# Patient Record
Sex: FEMALE | Race: BLACK OR AFRICAN AMERICAN | NOT HISPANIC OR LATINO | Employment: UNEMPLOYED | URBAN - METROPOLITAN AREA
[De-identification: names, ages, dates, MRNs, and addresses within clinical notes are randomized per-mention and may not be internally consistent; named-entity substitution may affect disease eponyms.]

---

## 2019-02-06 ENCOUNTER — OFFICE VISIT (OUTPATIENT)
Dept: PLASTIC SURGERY | Facility: CLINIC | Age: 54
End: 2019-02-06
Payer: OTHER GOVERNMENT

## 2019-02-06 VITALS
SYSTOLIC BLOOD PRESSURE: 119 MMHG | HEART RATE: 60 BPM | HEIGHT: 59 IN | BODY MASS INDEX: 28.34 KG/M2 | WEIGHT: 140.56 LBS | DIASTOLIC BLOOD PRESSURE: 75 MMHG

## 2019-02-06 DIAGNOSIS — Z15.09 BRCA GENE MUTATION POSITIVE: ICD-10-CM

## 2019-02-06 DIAGNOSIS — N62 MACROMASTIA: Primary | ICD-10-CM

## 2019-02-06 DIAGNOSIS — Z15.01 BRCA GENE MUTATION POSITIVE: ICD-10-CM

## 2019-02-06 PROCEDURE — 99999 PR PBB SHADOW E&M-NEW PATIENT-LVL III: ICD-10-PCS | Mod: PBBFAC,,, | Performed by: SURGERY

## 2019-02-06 PROCEDURE — 99999 PR PBB SHADOW E&M-NEW PATIENT-LVL III: CPT | Mod: PBBFAC,,, | Performed by: SURGERY

## 2019-02-06 PROCEDURE — 99203 PR OFFICE/OUTPT VISIT, NEW, LEVL III, 30-44 MIN: ICD-10-PCS | Mod: S$PBB,,, | Performed by: SURGERY

## 2019-02-06 PROCEDURE — 99203 OFFICE O/P NEW LOW 30 MIN: CPT | Mod: PBBFAC,PO | Performed by: SURGERY

## 2019-02-06 PROCEDURE — 99203 OFFICE O/P NEW LOW 30 MIN: CPT | Mod: S$PBB,,, | Performed by: SURGERY

## 2019-02-06 RX ORDER — CHOLECALCIFEROL (VITAMIN D3) 25 MCG
1000 TABLET ORAL DAILY
COMMUNITY
End: 2021-02-17 | Stop reason: CLARIF

## 2019-02-06 RX ORDER — ALBUTEROL SULFATE 5 MG/ML
2.5 SOLUTION RESPIRATORY (INHALATION) EVERY 6 HOURS PRN
COMMUNITY
End: 2021-02-17 | Stop reason: CLARIF

## 2019-02-06 NOTE — LETTER
[facility] [place]  [address]  [facility] [address]  Phone: [phone]  Fax: [phone] February 13, 2019      Mile Knight MD  [address] [facility] [address]    Patient: Trinity Morin   MR Number: [id number]   YOB: 1965   Date of Visit: 2/6/2019     Dear Dr. Knight:    Thank you for referring Trinity Morin to me for evaluation. Below are the relevant portions of my assessment and plan of care.    Ms. Morin is a 53-year-old female with BRCA mutation. She has been thorough with close follow-up with regular mammograms.  She is in our Plastic Surgery Clinic because she is interested in a reduction mammaplasty. She has no complaints. She does not want any mastectomy although she understands the risks of the BRCA mutation on breast cancer incidence.    We had a long discussion with the patient about breast reduction in that it will reduce her back and neck symptoms and likely reduce the risk of cancer but to a lesser extent than a mastectomy. We also explained that the usual guidelines recommend considering bilateral mastectomy, and that breast reduction is not a usual [facility] guideline for risk reduction of patient at high risk of breast cancer.     We will discuss her case with breast oncology and will contact her with the best recommendation.    If you have questions, please do not hesitate to call me. I look forward to following Trinity along with you.    Sincerely,      Kevin Diaz MD  Section of Plastic Surgery  Department of Surgery  [facility]     SAY/rowena

## 2019-02-06 NOTE — PROGRESS NOTES
Ms. Morin is a 53 F with BRCA mutation. She has been thorough with close follow-up with regular mammograms.  She is in our plastic surgery clinic because she is interested in a reduction mammaplasty.   She has no complaints.  She does not want any mastectomy although she understands the risks of the BRCA mutation on breast cancer incidence.    We had a long discussion with the patient about breast reduction in that it will reduce her back and neck symptoms and likely reduce the risk of cancer but to a lesser extent than a mastectomy. We also explained that the usual guidelines recommend considering bilateral mastectomy, and that breast reduction is not a usual [facility] guideline for risk reduction of patient at high risk of breast cancer.    We will discuss her case with breast oncology and will contact her with the best recommendation.

## 2019-04-05 ENCOUNTER — TELEPHONE (OUTPATIENT)
Dept: PLASTIC SURGERY | Facility: CLINIC | Age: 54
End: 2019-04-05

## 2019-04-10 ENCOUNTER — OFFICE VISIT (OUTPATIENT)
Dept: PLASTIC SURGERY | Facility: CLINIC | Age: 54
End: 2019-04-10
Payer: OTHER GOVERNMENT

## 2019-04-10 VITALS
BODY MASS INDEX: 26.95 KG/M2 | SYSTOLIC BLOOD PRESSURE: 125 MMHG | HEIGHT: 59 IN | WEIGHT: 133.69 LBS | DIASTOLIC BLOOD PRESSURE: 79 MMHG | HEART RATE: 67 BPM

## 2019-04-10 DIAGNOSIS — Z15.01 BRCA GENE MUTATION POSITIVE: Primary | ICD-10-CM

## 2019-04-10 DIAGNOSIS — Z15.09 BRCA GENE MUTATION POSITIVE: Primary | ICD-10-CM

## 2019-04-10 PROCEDURE — 99999 PR PBB SHADOW E&M-EST. PATIENT-LVL III: CPT | Mod: PBBFAC,,, | Performed by: SURGERY

## 2019-04-10 PROCEDURE — 99213 OFFICE O/P EST LOW 20 MIN: CPT | Mod: PBBFAC,PO | Performed by: SURGERY

## 2019-04-10 PROCEDURE — 99211 PR OFFICE/OUTPT VISIT, EST, LEVL I: ICD-10-PCS | Mod: S$PBB,,, | Performed by: SURGERY

## 2019-04-10 PROCEDURE — 99211 OFF/OP EST MAY X REQ PHY/QHP: CPT | Mod: S$PBB,,, | Performed by: SURGERY

## 2019-04-10 PROCEDURE — 99999 PR PBB SHADOW E&M-EST. PATIENT-LVL III: ICD-10-PCS | Mod: PBBFAC,,, | Performed by: SURGERY

## 2019-04-10 NOTE — PROGRESS NOTES
Pt returns to clinic for breast reduction. She is BRCA pos and during her last visit  I discussed with her that I needed to check with our breast surgeons regarding a mastectomy vs a reduction.  It was found that it is a patient choice. She understands that she will continue to be a high risk for breast cancer   With a reduction. She stated that she does not want a mastectomy and understands the risks.  We will proceed.

## 2019-05-02 ENCOUNTER — TELEPHONE (OUTPATIENT)
Dept: PLASTIC SURGERY | Facility: CLINIC | Age: 54
End: 2019-05-02

## 2020-04-30 ENCOUNTER — TELEPHONE (OUTPATIENT)
Dept: PLASTIC SURGERY | Facility: CLINIC | Age: 55
End: 2020-04-30

## 2020-04-30 NOTE — TELEPHONE ENCOUNTER
Attempted to contact pt to schedule consult.  Pt was not available at the time of the call.  I left a detailed VM asking pt to call PLS office at her earliest convenience.     ----- Message from Kevin Kimble sent at 4/30/2020 11:39 AM CDT -----  Contact: Pt  Patient called to speak w/ someone regarding scheduling for her breast augmentation, requesting callback     Callback: [phone] (home)

## 2020-09-16 ENCOUNTER — OFFICE VISIT (OUTPATIENT)
Dept: PLASTIC SURGERY | Facility: CLINIC | Age: 55
End: 2020-09-16
Payer: OTHER GOVERNMENT

## 2020-09-16 VITALS
BODY MASS INDEX: 26.94 KG/M2 | WEIGHT: 133.63 LBS | DIASTOLIC BLOOD PRESSURE: 75 MMHG | HEART RATE: 58 BPM | HEIGHT: 59 IN | SYSTOLIC BLOOD PRESSURE: 128 MMHG

## 2020-09-16 DIAGNOSIS — N62 MACROMASTIA: Primary | ICD-10-CM

## 2020-09-16 DIAGNOSIS — Z15.09 BRCA GENE MUTATION POSITIVE: ICD-10-CM

## 2020-09-16 DIAGNOSIS — Z15.01 BRCA GENE MUTATION POSITIVE: ICD-10-CM

## 2020-09-16 PROCEDURE — 99213 PR OFFICE/OUTPT VISIT, EST, LEVL III, 20-29 MIN: ICD-10-PCS | Mod: S$PBB,,, | Performed by: SURGERY

## 2020-09-16 PROCEDURE — 99213 OFFICE O/P EST LOW 20 MIN: CPT | Mod: S$PBB,,, | Performed by: SURGERY

## 2020-09-16 PROCEDURE — 99213 OFFICE O/P EST LOW 20 MIN: CPT | Mod: PBBFAC | Performed by: SURGERY

## 2020-09-16 PROCEDURE — 99999 PR PBB SHADOW E&M-EST. PATIENT-LVL III: ICD-10-PCS | Mod: PBBFAC,,, | Performed by: SURGERY

## 2020-09-16 PROCEDURE — 99999 PR PBB SHADOW E&M-EST. PATIENT-LVL III: CPT | Mod: PBBFAC,,, | Performed by: SURGERY

## 2020-09-16 NOTE — PROGRESS NOTES
History & Physical    SUBJECTIVE:   Chief complaint: large breasts    History of Present Illness:    Trinity Morin presents to [facility] on 9/16/2020 for evaluation for bilateral breast reduction secondary to symptomatic bilateral large pendulous breast. She has a chief complaint of chronic neck and back pain for many years. She has tried NSAIDs/Tylenol without alleviation of pain. She also complains of deep shoulder grooving from her bra straps as well as macerating rashes below each breast that have not significantly improved with application of medicated ointments and creams.  Pt is BRCA positive but does not want mastectomy.      No past medical history on file.    No past surgical history on file.    No family history on file.    Social History     Socioeconomic History    Marital status: Single     Spouse name: Not on file    Number of children: Not on file    Years of education: Not on file    Highest education level: Not on file   Occupational History    Not on file   Social Needs    Financial resource strain: Not on file    Food insecurity     Worry: Not on file     Inability: Not on file    Transportation needs     Medical: Not on file     Non-medical: Not on file   Tobacco Use    Smoking status: Never Smoker    Smokeless tobacco: Never Used   Substance and Sexual Activity    Alcohol use: Not on file    Drug use: Not on file    Sexual activity: Not on file   Lifestyle    Physical activity     Days per week: Not on file     Minutes per session: Not on file    Stress: Not on file   Relationships    Social connections     Talks on phone: Not on file     Gets together: Not on file     Attends Gnosticist service: Not on file     Active member of club or organization: Not on file     Attends meetings of clubs or organizations: Not on file     Relationship status: Not on file   Other Topics Concern    Not on file   Social History Narrative    Not on file       Current  Detail Level: Zone "Outpatient Medications   Medication Sig Dispense Refill    albuterol (PROVENTIL) 5 mg/mL nebulizer solution Take 2.5 mg by nebulization every 6 (six) hours as needed for Wheezing. Rescue      cyanocobalamin, vitamin B-12, (VITAMIN B-12) 50 mcg tablet Take 50 mcg by mouth once daily.      vitamin D (VITAMIN D3) 1000 units Tab Take 1,000 Units by mouth once daily.       No current facility-administered medications for this visit.        Review of patient's allergies indicates:   Allergen Reactions    Bactrim [sulfamethoxazole-trimethoprim]     Macrodantin [nitrofurantoin macrocrystalline]     Sulfa (sulfonamide antibiotics)          Review of Systems:    Review of Systems   HENT: Positive for neck pain.    Musculoskeletal: Positive for back pain.   Neurological: Negative for headaches or dizziness      OBJECTIVE:     /75   Pulse (!) 58   Ht 4' 11" (1.499 m)   Wt 60.6 kg (133 lb 9.6 oz)   BMI 26.98 kg/m²       Physical Exam:    Physical Exam   Constitutional: She is oriented to person, place, and time. She appears well-developed and well-nourished.   Neck: Normal range of motion. Neck supple. No tracheal deviation present.   Cardiovascular: Normal rate, regular rhythm and normal heart sounds.    Pulmonary/Chest: Effort normal and breath sounds normal. bilaterally enlarged breasts, evidence of previous rashes, shoulder grooving, no palpable masses, nipple everted  Abdominal: Soft. Bowel sounds are normal.   Musculoskeletal: Normal range of motion.   Neurological: She is alert and oriented to person, place, and time.   Skin: Skin is warm.   Breasts: large pendulous breasts    ASSESSMENT/PLAN:     1.Symptomatic Bilateral Macromastia  2. Chronic neck pain  3. Chronic back pain  4. Chronic breast rashes    PLAN:Plan    - will need MRI/mammogram and any outside imaging prior to proceeding w/ BBR  -Will submit paper work for insurance approval  -Photos obtained  -Risk, benefits, and alternatives explained. She " understands that the risks include but are not limited to bleeding, scarring, infection, pain, numbness, asymmetry, deformity, open wound, skin necrosis, wound dehiscence, permanent or temporary loss of sensation to the nipple, partial or total nipple loss requiring removal, poor cosmetic outcome, hematoma, seroma and pulmonary emobolus.   - Patient would like to proceed with scheduling bilateral breast reduction pending insurance authorization    All questions were answered. The patient was advised to call the clinic with any questions or concerns prior to their next visit.

## 2020-09-29 ENCOUNTER — TELEPHONE (OUTPATIENT)
Dept: PLASTIC SURGERY | Facility: CLINIC | Age: 55
End: 2020-09-29

## 2020-09-29 NOTE — TELEPHONE ENCOUNTER
Pt was called back in regards to message below. Pt understands that we will be in touch when we hear from insurance      ----- Message from Mayra Berry sent at 9/29/2020 12:44 PM CDT -----  Regarding: Pt  Reason: Pt calling to speak with Ab regard to scheduling surgery. Pt stated she's trying to make arrangements to go out of town. Please call     Communication: [phone]

## 2021-01-27 ENCOUNTER — PATIENT MESSAGE (OUTPATIENT)
Dept: PLASTIC SURGERY | Facility: CLINIC | Age: 56
End: 2021-01-27

## 2021-01-27 ENCOUNTER — OFFICE VISIT (OUTPATIENT)
Dept: PLASTIC SURGERY | Facility: CLINIC | Age: 56
End: 2021-01-27
Payer: OTHER GOVERNMENT

## 2021-01-27 DIAGNOSIS — N62 MACROMASTIA: ICD-10-CM

## 2021-01-27 DIAGNOSIS — Z15.09 BRCA GENE MUTATION POSITIVE: Primary | ICD-10-CM

## 2021-01-27 DIAGNOSIS — Z15.01 BRCA GENE MUTATION POSITIVE: Primary | ICD-10-CM

## 2021-01-27 PROCEDURE — 99999 PR PBB SHADOW E&M-EST. PATIENT-LVL I: CPT | Mod: PBBFAC,,, | Performed by: SURGERY

## 2021-01-27 PROCEDURE — 99213 PR OFFICE/OUTPT VISIT, EST, LEVL III, 20-29 MIN: ICD-10-PCS | Mod: S$PBB,,, | Performed by: SURGERY

## 2021-01-27 PROCEDURE — 99213 OFFICE O/P EST LOW 20 MIN: CPT | Mod: S$PBB,,, | Performed by: SURGERY

## 2021-01-27 PROCEDURE — 99999 PR PBB SHADOW E&M-EST. PATIENT-LVL I: ICD-10-PCS | Mod: PBBFAC,,, | Performed by: SURGERY

## 2021-01-27 PROCEDURE — 99211 OFF/OP EST MAY X REQ PHY/QHP: CPT | Mod: PBBFAC | Performed by: SURGERY

## 2021-02-08 DIAGNOSIS — Z01.818 PRE-OP TESTING: Primary | ICD-10-CM

## 2021-02-08 DIAGNOSIS — N62 MACROMASTIA: Primary | ICD-10-CM

## 2021-02-18 ENCOUNTER — TELEPHONE (OUTPATIENT)
Dept: PLASTIC SURGERY | Facility: CLINIC | Age: 56
End: 2021-02-18

## 2021-02-18 ENCOUNTER — TELEPHONE (OUTPATIENT)
Dept: PREADMISSION TESTING | Facility: HOSPITAL | Age: 56
End: 2021-02-18

## 2021-02-22 ENCOUNTER — LAB VISIT (OUTPATIENT)
Dept: LAB | Facility: HOSPITAL | Age: 56
End: 2021-02-22
Attending: ANESTHESIOLOGY
Payer: OTHER GOVERNMENT

## 2021-02-22 ENCOUNTER — LAB VISIT (OUTPATIENT)
Dept: INTERNAL MEDICINE | Facility: CLINIC | Age: 56
End: 2021-02-22
Payer: OTHER GOVERNMENT

## 2021-02-22 DIAGNOSIS — Z01.818 PRE-OP TESTING: ICD-10-CM

## 2021-02-22 DIAGNOSIS — Z98.890 S/P BILATERAL BREAST REDUCTION: ICD-10-CM

## 2021-02-22 PROCEDURE — 85018 HEMOGLOBIN: CPT

## 2021-02-22 PROCEDURE — 36415 COLL VENOUS BLD VENIPUNCTURE: CPT

## 2021-02-22 PROCEDURE — U0005 INFEC AGEN DETEC AMPLI PROBE: HCPCS

## 2021-02-22 PROCEDURE — U0003 INFECTIOUS AGENT DETECTION BY NUCLEIC ACID (DNA OR RNA); SEVERE ACUTE RESPIRATORY SYNDROME CORONAVIRUS 2 (SARS-COV-2) (CORONAVIRUS DISEASE [COVID-19]), AMPLIFIED PROBE TECHNIQUE, MAKING USE OF HIGH THROUGHPUT TECHNOLOGIES AS DESCRIBED BY CMS-2020-01-R: HCPCS

## 2021-02-23 LAB
HGB BLD-MCNC: 12.8 G/DL (ref 12–16)
SARS-COV-2 RNA RESP QL NAA+PROBE: NOT DETECTED

## 2021-02-24 ENCOUNTER — ANESTHESIA EVENT (OUTPATIENT)
Dept: SURGERY | Facility: HOSPITAL | Age: 56
End: 2021-02-24
Payer: OTHER GOVERNMENT

## 2021-02-24 ENCOUNTER — TELEPHONE (OUTPATIENT)
Dept: PLASTIC SURGERY | Facility: CLINIC | Age: 56
End: 2021-02-24

## 2021-02-25 ENCOUNTER — ANESTHESIA (OUTPATIENT)
Dept: SURGERY | Facility: HOSPITAL | Age: 56
End: 2021-02-25
Payer: OTHER GOVERNMENT

## 2021-02-25 ENCOUNTER — HOSPITAL ENCOUNTER (OUTPATIENT)
Facility: HOSPITAL | Age: 56
Discharge: HOME | End: 2021-02-25
Attending: SURGERY | Admitting: SURGERY
Payer: OTHER GOVERNMENT

## 2021-02-25 VITALS
OXYGEN SATURATION: 100 % | HEIGHT: 59 IN | WEIGHT: 134 LBS | HEART RATE: 82 BPM | DIASTOLIC BLOOD PRESSURE: 76 MMHG | SYSTOLIC BLOOD PRESSURE: 134 MMHG | BODY MASS INDEX: 27.01 KG/M2 | RESPIRATION RATE: 16 BRPM | TEMPERATURE: 98 F

## 2021-02-25 DIAGNOSIS — Z01.818 PRE-OP TESTING: ICD-10-CM

## 2021-02-25 PROCEDURE — 64461 PVB THORACIC SINGLE INJ SITE: CPT | Performed by: STUDENT IN AN ORGANIZED HEALTH CARE EDUCATION/TRAINING PROGRAM

## 2021-02-25 PROCEDURE — 63600175 PHARM REV CODE 636 W HCPCS: Performed by: ANESTHESIOLOGY

## 2021-02-25 PROCEDURE — 88307 PR  SURG PATH,LEVEL V: ICD-10-PCS | Mod: 26,,, | Performed by: PATHOLOGY

## 2021-02-25 PROCEDURE — 00402 ANES INTEG SYS RCNSTV BREAST: CPT | Performed by: SURGERY

## 2021-02-25 PROCEDURE — 37000009 HC ANESTHESIA EA ADD 15 MINS: Performed by: SURGERY

## 2021-02-25 PROCEDURE — 88305 TISSUE EXAM BY PATHOLOGIST: CPT | Performed by: PATHOLOGY

## 2021-02-25 PROCEDURE — 63600175 PHARM REV CODE 636 W HCPCS: Performed by: STUDENT IN AN ORGANIZED HEALTH CARE EDUCATION/TRAINING PROGRAM

## 2021-02-25 PROCEDURE — 88331 PATH CONSLTJ SURG 1 BLK 1SPC: CPT | Performed by: PATHOLOGY

## 2021-02-25 PROCEDURE — 63600175 PHARM REV CODE 636 W HCPCS: Performed by: NURSE ANESTHETIST, CERTIFIED REGISTERED

## 2021-02-25 PROCEDURE — 64461 PVB THORACIC SINGLE INJ SITE: CPT | Mod: 50,59,, | Performed by: ANESTHESIOLOGY

## 2021-02-25 PROCEDURE — 88331 PR  PATH CONSULT IN SURG,W FRZ SEC: ICD-10-PCS | Mod: 26,,, | Performed by: PATHOLOGY

## 2021-02-25 PROCEDURE — 25000003 PHARM REV CODE 250: Performed by: SURGERY

## 2021-02-25 PROCEDURE — 71000016 HC POSTOP RECOV ADDL HR: Performed by: SURGERY

## 2021-02-25 PROCEDURE — 36000706: Performed by: SURGERY

## 2021-02-25 PROCEDURE — 88307 TISSUE EXAM BY PATHOLOGIST: CPT | Mod: 26,,, | Performed by: PATHOLOGY

## 2021-02-25 PROCEDURE — 88305 TISSUE EXAM BY PATHOLOGIST: CPT | Mod: 26,,, | Performed by: PATHOLOGY

## 2021-02-25 PROCEDURE — 64461 ERECTOR SPINAE PLANE SINGLE INJECTION BLOCK: ICD-10-PCS | Mod: 50,59,, | Performed by: ANESTHESIOLOGY

## 2021-02-25 PROCEDURE — 88307 TISSUE EXAM BY PATHOLOGIST: CPT | Performed by: PATHOLOGY

## 2021-02-25 PROCEDURE — 36000707: Performed by: SURGERY

## 2021-02-25 PROCEDURE — 71000044 HC DOSC ROUTINE RECOVERY FIRST HOUR: Performed by: SURGERY

## 2021-02-25 PROCEDURE — 19318 BREAST REDUCTION: CPT | Mod: 50,,, | Performed by: SURGERY

## 2021-02-25 PROCEDURE — 25000003 PHARM REV CODE 250: Performed by: ANESTHESIOLOGY

## 2021-02-25 PROCEDURE — 63600175 PHARM REV CODE 636 W HCPCS: Performed by: GENERAL PRACTICE

## 2021-02-25 PROCEDURE — D9220A PRA ANESTHESIA: Mod: ,,, | Performed by: ANESTHESIOLOGY

## 2021-02-25 PROCEDURE — 25000003 PHARM REV CODE 250: Performed by: NURSE ANESTHETIST, CERTIFIED REGISTERED

## 2021-02-25 PROCEDURE — 27201423 OPTIME MED/SURG SUP & DEVICES STERILE SUPPLY: Performed by: SURGERY

## 2021-02-25 PROCEDURE — 71000015 HC POSTOP RECOV 1ST HR: Performed by: SURGERY

## 2021-02-25 PROCEDURE — C1729 CATH, DRAINAGE: HCPCS | Performed by: SURGERY

## 2021-02-25 PROCEDURE — 88305 TISSUE EXAM BY PATHOLOGIST: ICD-10-PCS | Mod: 26,,, | Performed by: PATHOLOGY

## 2021-02-25 PROCEDURE — 37000008 HC ANESTHESIA 1ST 15 MINUTES: Performed by: SURGERY

## 2021-02-25 PROCEDURE — D9220A PRA ANESTHESIA: ICD-10-PCS | Mod: ,,, | Performed by: ANESTHESIOLOGY

## 2021-02-25 PROCEDURE — 71000045 HC DOSC ROUTINE RECOVERY EA ADD'L HR: Performed by: SURGERY

## 2021-02-25 PROCEDURE — 25000003 PHARM REV CODE 250: Performed by: STUDENT IN AN ORGANIZED HEALTH CARE EDUCATION/TRAINING PROGRAM

## 2021-02-25 PROCEDURE — 19318 PR REDUCTION OF LARGE BREAST: ICD-10-PCS | Mod: 50,,, | Performed by: SURGERY

## 2021-02-25 PROCEDURE — S0020 INJECTION, BUPIVICAINE HYDRO: HCPCS | Performed by: ANESTHESIOLOGY

## 2021-02-25 PROCEDURE — 88331 PATH CONSLTJ SURG 1 BLK 1SPC: CPT | Mod: 26,,, | Performed by: PATHOLOGY

## 2021-02-25 RX ORDER — HALOPERIDOL 5 MG/ML
0.5 INJECTION INTRAMUSCULAR EVERY 10 MIN PRN
Status: DISCONTINUED | OUTPATIENT
Start: 2021-02-25 | End: 2021-02-25 | Stop reason: HOSPADM

## 2021-02-25 RX ORDER — CEPHALEXIN 500 MG/1
500 CAPSULE ORAL EVERY 8 HOURS
Qty: 21 CAPSULE | Refills: 0 | Status: SHIPPED | OUTPATIENT
Start: 2021-02-25 | End: 2021-03-04

## 2021-02-25 RX ORDER — ROCURONIUM BROMIDE 10 MG/ML
INJECTION, SOLUTION INTRAVENOUS
Status: DISCONTINUED | OUTPATIENT
Start: 2021-02-25 | End: 2021-02-25

## 2021-02-25 RX ORDER — PHENYLEPHRINE HYDROCHLORIDE 10 MG/ML
INJECTION INTRAVENOUS
Status: DISCONTINUED | OUTPATIENT
Start: 2021-02-25 | End: 2021-02-25

## 2021-02-25 RX ORDER — LIDOCAINE HYDROCHLORIDE 20 MG/ML
INJECTION, SOLUTION EPIDURAL; INFILTRATION; INTRACAUDAL; PERINEURAL
Status: DISCONTINUED | OUTPATIENT
Start: 2021-02-25 | End: 2021-02-25

## 2021-02-25 RX ORDER — OXYCODONE AND ACETAMINOPHEN 5; 325 MG/1; MG/1
1 TABLET ORAL
Status: DISCONTINUED | OUTPATIENT
Start: 2021-02-25 | End: 2021-02-25 | Stop reason: HOSPADM

## 2021-02-25 RX ORDER — PROPOFOL 10 MG/ML
VIAL (ML) INTRAVENOUS
Status: DISCONTINUED | OUTPATIENT
Start: 2021-02-25 | End: 2021-02-25

## 2021-02-25 RX ORDER — ONDANSETRON 2 MG/ML
INJECTION INTRAMUSCULAR; INTRAVENOUS
Status: DISCONTINUED | OUTPATIENT
Start: 2021-02-25 | End: 2021-02-25

## 2021-02-25 RX ORDER — KETAMINE HCL IN 0.9 % NACL 50 MG/5 ML
SYRINGE (ML) INTRAVENOUS
Status: DISCONTINUED | OUTPATIENT
Start: 2021-02-25 | End: 2021-02-25

## 2021-02-25 RX ORDER — ONDANSETRON 2 MG/ML
4 INJECTION INTRAMUSCULAR; INTRAVENOUS EVERY 12 HOURS PRN
Status: DISCONTINUED | OUTPATIENT
Start: 2021-02-25 | End: 2021-02-25 | Stop reason: HOSPADM

## 2021-02-25 RX ORDER — OXYCODONE AND ACETAMINOPHEN 5; 325 MG/1; MG/1
1 TABLET ORAL EVERY 6 HOURS PRN
Qty: 20 TABLET | Refills: 0 | Status: SHIPPED | OUTPATIENT
Start: 2021-02-25 | End: 2021-03-02

## 2021-02-25 RX ORDER — FENTANYL CITRATE 50 UG/ML
INJECTION, SOLUTION INTRAMUSCULAR; INTRAVENOUS
Status: DISCONTINUED | OUTPATIENT
Start: 2021-02-25 | End: 2021-02-25

## 2021-02-25 RX ORDER — KETOROLAC TROMETHAMINE 30 MG/ML
15 INJECTION, SOLUTION INTRAMUSCULAR; INTRAVENOUS EVERY 8 HOURS PRN
Status: DISCONTINUED | OUTPATIENT
Start: 2021-02-25 | End: 2021-02-25 | Stop reason: HOSPADM

## 2021-02-25 RX ORDER — HEPARIN SODIUM 5000 [USP'U]/ML
5000 INJECTION, SOLUTION INTRAVENOUS; SUBCUTANEOUS EVERY 8 HOURS
Status: DISCONTINUED | OUTPATIENT
Start: 2021-02-25 | End: 2021-02-25 | Stop reason: HOSPADM

## 2021-02-25 RX ORDER — BUPIVACAINE HYDROCHLORIDE 7.5 MG/ML
INJECTION, SOLUTION EPIDURAL; RETROBULBAR
Status: COMPLETED | OUTPATIENT
Start: 2021-02-25 | End: 2021-02-25

## 2021-02-25 RX ORDER — SODIUM CHLORIDE 9 MG/ML
INJECTION, SOLUTION INTRAVENOUS CONTINUOUS
Status: DISCONTINUED | OUTPATIENT
Start: 2021-02-25 | End: 2021-02-25 | Stop reason: HOSPADM

## 2021-02-25 RX ORDER — FENTANYL CITRATE 50 UG/ML
25 INJECTION, SOLUTION INTRAMUSCULAR; INTRAVENOUS EVERY 5 MIN PRN
Status: DISCONTINUED | OUTPATIENT
Start: 2021-02-25 | End: 2021-02-25 | Stop reason: HOSPADM

## 2021-02-25 RX ORDER — BACITRACIN ZINC 500 UNIT/G
OINTMENT (GRAM) TOPICAL
Status: DISCONTINUED | OUTPATIENT
Start: 2021-02-25 | End: 2021-02-25 | Stop reason: HOSPADM

## 2021-02-25 RX ORDER — CEFAZOLIN SODIUM 1 G/3ML
2 INJECTION, POWDER, FOR SOLUTION INTRAMUSCULAR; INTRAVENOUS
Status: COMPLETED | OUTPATIENT
Start: 2021-02-25 | End: 2021-02-25

## 2021-02-25 RX ORDER — MIDAZOLAM HYDROCHLORIDE 1 MG/ML
0.5 INJECTION INTRAMUSCULAR; INTRAVENOUS
Status: DISCONTINUED | OUTPATIENT
Start: 2021-02-25 | End: 2021-02-25 | Stop reason: HOSPADM

## 2021-02-25 RX ORDER — LIDOCAINE HYDROCHLORIDE AND EPINEPHRINE 20; 10 MG/ML; UG/ML
INJECTION, SOLUTION INFILTRATION; PERINEURAL
Status: DISCONTINUED | OUTPATIENT
Start: 2021-02-25 | End: 2021-02-25 | Stop reason: HOSPADM

## 2021-02-25 RX ORDER — HYDROMORPHONE HYDROCHLORIDE 1 MG/ML
0.2 INJECTION, SOLUTION INTRAMUSCULAR; INTRAVENOUS; SUBCUTANEOUS EVERY 5 MIN PRN
Status: DISCONTINUED | OUTPATIENT
Start: 2021-02-25 | End: 2021-02-25 | Stop reason: HOSPADM

## 2021-02-25 RX ADMIN — Medication 30 MG: at 11:02

## 2021-02-25 RX ADMIN — HYDROMORPHONE HYDROCHLORIDE 0.2 MG: 1 INJECTION, SOLUTION INTRAMUSCULAR; INTRAVENOUS; SUBCUTANEOUS at 03:02

## 2021-02-25 RX ADMIN — ROCURONIUM BROMIDE 25 MG: 10 INJECTION, SOLUTION INTRAVENOUS at 01:02

## 2021-02-25 RX ADMIN — SODIUM CHLORIDE: 0.9 INJECTION, SOLUTION INTRAVENOUS at 11:02

## 2021-02-25 RX ADMIN — MIDAZOLAM 2 MG: 1 INJECTION INTRAMUSCULAR; INTRAVENOUS at 10:02

## 2021-02-25 RX ADMIN — Medication 10 MG: at 02:02

## 2021-02-25 RX ADMIN — SODIUM CHLORIDE: 0.9 INJECTION, SOLUTION INTRAVENOUS at 10:02

## 2021-02-25 RX ADMIN — CEFAZOLIN 2 G: 330 INJECTION, POWDER, FOR SOLUTION INTRAMUSCULAR; INTRAVENOUS at 12:02

## 2021-02-25 RX ADMIN — PHENYLEPHRINE HYDROCHLORIDE 100 MCG: 10 INJECTION INTRAVENOUS at 12:02

## 2021-02-25 RX ADMIN — FENTANYL CITRATE 50 MCG: 50 INJECTION, SOLUTION INTRAMUSCULAR; INTRAVENOUS at 12:02

## 2021-02-25 RX ADMIN — ONDANSETRON 4 MG: 2 INJECTION INTRAMUSCULAR; INTRAVENOUS at 02:02

## 2021-02-25 RX ADMIN — Medication 10 MG: at 01:02

## 2021-02-25 RX ADMIN — BUPIVACAINE HYDROCHLORIDE 30 ML: 7.5 INJECTION, SOLUTION EPIDURAL; RETROBULBAR at 11:02

## 2021-02-25 RX ADMIN — HEPARIN SODIUM 5000 UNITS: 5000 INJECTION INTRAVENOUS; SUBCUTANEOUS at 11:02

## 2021-02-25 RX ADMIN — ROCURONIUM BROMIDE 15 MG: 10 INJECTION, SOLUTION INTRAVENOUS at 01:02

## 2021-02-25 RX ADMIN — FENTANYL CITRATE 50 MCG: 50 INJECTION, SOLUTION INTRAMUSCULAR; INTRAVENOUS at 02:02

## 2021-02-25 RX ADMIN — MIDAZOLAM 2 MG: 1 INJECTION INTRAMUSCULAR; INTRAVENOUS at 11:02

## 2021-02-25 RX ADMIN — SUGAMMADEX 200 MG: 100 INJECTION, SOLUTION INTRAVENOUS at 02:02

## 2021-02-25 RX ADMIN — FENTANYL CITRATE 50 MCG: 50 INJECTION INTRAMUSCULAR; INTRAVENOUS at 10:02

## 2021-02-25 RX ADMIN — PHENYLEPHRINE HYDROCHLORIDE 100 MCG: 10 INJECTION INTRAVENOUS at 02:02

## 2021-02-25 RX ADMIN — PROPOFOL 150 MG: 10 INJECTION, EMULSION INTRAVENOUS at 11:02

## 2021-02-25 RX ADMIN — LIDOCAINE HYDROCHLORIDE 100 MG: 20 INJECTION, SOLUTION EPIDURAL; INFILTRATION; INTRACAUDAL at 11:02

## 2021-02-25 RX ADMIN — ROCURONIUM BROMIDE 50 MG: 10 INJECTION, SOLUTION INTRAVENOUS at 11:02

## 2021-02-25 RX ADMIN — PHENYLEPHRINE HYDROCHLORIDE 200 MCG: 10 INJECTION INTRAVENOUS at 02:02

## 2021-02-25 RX ADMIN — PHENYLEPHRINE HYDROCHLORIDE 200 MCG: 10 INJECTION INTRAVENOUS at 01:02

## 2021-02-26 ENCOUNTER — NURSE TRIAGE (OUTPATIENT)
Dept: ADMINISTRATIVE | Facility: CLINIC | Age: 56
End: 2021-02-26

## 2021-02-27 ENCOUNTER — PATIENT MESSAGE (OUTPATIENT)
Dept: ADMINISTRATIVE | Facility: OTHER | Age: 56
End: 2021-02-27

## 2021-02-28 ENCOUNTER — NURSE TRIAGE (OUTPATIENT)
Dept: ADMINISTRATIVE | Facility: CLINIC | Age: 56
End: 2021-02-28

## 2021-03-02 LAB
FINAL PATHOLOGIC DIAGNOSIS: NORMAL
FROZEN SECTION DIAGNOSIS: NORMAL
FROZEN SECTION FOOTNOTE: NORMAL
GROSS: NORMAL
Lab: NORMAL

## 2021-03-03 ENCOUNTER — OFFICE VISIT (OUTPATIENT)
Dept: PLASTIC SURGERY | Facility: CLINIC | Age: 56
End: 2021-03-03
Payer: OTHER GOVERNMENT

## 2021-03-03 VITALS — BODY MASS INDEX: 27.03 KG/M2 | WEIGHT: 134.06 LBS | HEIGHT: 59 IN

## 2021-03-03 DIAGNOSIS — Z09 SURGERY FOLLOW-UP EXAMINATION: Primary | ICD-10-CM

## 2021-03-03 PROCEDURE — 99024 POSTOP FOLLOW-UP VISIT: CPT | Mod: ,,, | Performed by: SURGERY

## 2021-03-03 PROCEDURE — 99024 PR POST-OP FOLLOW-UP VISIT: ICD-10-PCS | Mod: ,,, | Performed by: SURGERY

## 2021-03-03 PROCEDURE — 99999 PR PBB SHADOW E&M-EST. PATIENT-LVL III: ICD-10-PCS | Mod: PBBFAC,,, | Performed by: SURGERY

## 2021-03-03 PROCEDURE — 99999 PR PBB SHADOW E&M-EST. PATIENT-LVL III: CPT | Mod: PBBFAC,,, | Performed by: SURGERY

## 2021-03-03 PROCEDURE — 99213 OFFICE O/P EST LOW 20 MIN: CPT | Mod: PBBFAC | Performed by: SURGERY

## 2021-03-17 ENCOUNTER — OFFICE VISIT (OUTPATIENT)
Dept: PLASTIC SURGERY | Facility: CLINIC | Age: 56
End: 2021-03-17
Payer: OTHER GOVERNMENT

## 2021-03-17 VITALS
HEIGHT: 59 IN | BODY MASS INDEX: 27.01 KG/M2 | HEART RATE: 86 BPM | DIASTOLIC BLOOD PRESSURE: 76 MMHG | WEIGHT: 134 LBS | SYSTOLIC BLOOD PRESSURE: 124 MMHG

## 2021-03-17 DIAGNOSIS — Z09 SURGERY FOLLOW-UP EXAMINATION: Primary | ICD-10-CM

## 2021-03-17 PROCEDURE — 99999 PR PBB SHADOW E&M-EST. PATIENT-LVL III: CPT | Mod: PBBFAC,,, | Performed by: SURGERY

## 2021-03-17 PROCEDURE — 99024 POSTOP FOLLOW-UP VISIT: CPT | Mod: ,,, | Performed by: SURGERY

## 2021-03-17 PROCEDURE — 99024 PR POST-OP FOLLOW-UP VISIT: ICD-10-PCS | Mod: ,,, | Performed by: SURGERY

## 2021-03-17 PROCEDURE — 99999 PR PBB SHADOW E&M-EST. PATIENT-LVL III: ICD-10-PCS | Mod: PBBFAC,,, | Performed by: SURGERY

## 2021-03-17 PROCEDURE — 99213 OFFICE O/P EST LOW 20 MIN: CPT | Mod: PBBFAC | Performed by: SURGERY

## 2021-04-28 ENCOUNTER — PATIENT MESSAGE (OUTPATIENT)
Dept: RESEARCH | Facility: HOSPITAL | Age: 56
End: 2021-04-28

## 2021-04-28 ENCOUNTER — OFFICE VISIT (OUTPATIENT)
Dept: PLASTIC SURGERY | Facility: CLINIC | Age: 56
End: 2021-04-28
Payer: OTHER GOVERNMENT

## 2021-04-28 VITALS — DIASTOLIC BLOOD PRESSURE: 72 MMHG | SYSTOLIC BLOOD PRESSURE: 130 MMHG | HEART RATE: 76 BPM

## 2021-04-28 DIAGNOSIS — Z09 SURGERY FOLLOW-UP EXAMINATION: Primary | ICD-10-CM

## 2021-04-28 PROCEDURE — 99024 PR POST-OP FOLLOW-UP VISIT: ICD-10-PCS | Mod: ,,, | Performed by: SURGERY

## 2021-04-28 PROCEDURE — 99213 OFFICE O/P EST LOW 20 MIN: CPT | Mod: PBBFAC | Performed by: SURGERY

## 2021-04-28 PROCEDURE — 99999 PR PBB SHADOW E&M-EST. PATIENT-LVL III: CPT | Mod: PBBFAC,,, | Performed by: SURGERY

## 2021-04-28 PROCEDURE — 99999 PR PBB SHADOW E&M-EST. PATIENT-LVL III: ICD-10-PCS | Mod: PBBFAC,,, | Performed by: SURGERY

## 2021-04-28 PROCEDURE — 99024 POSTOP FOLLOW-UP VISIT: CPT | Mod: ,,, | Performed by: SURGERY

## 2021-06-21 ENCOUNTER — PATIENT MESSAGE (OUTPATIENT)
Dept: PLASTIC SURGERY | Facility: CLINIC | Age: 56
End: 2021-06-21

## 2021-09-29 ENCOUNTER — TELEPHONE (OUTPATIENT)
Dept: PLASTIC SURGERY | Facility: CLINIC | Age: 56
End: 2021-09-29

## 2021-10-27 ENCOUNTER — TELEPHONE (OUTPATIENT)
Dept: PLASTIC SURGERY | Facility: CLINIC | Age: 56
End: 2021-10-27
Payer: OTHER GOVERNMENT

## 2022-03-16 ENCOUNTER — OFFICE VISIT (OUTPATIENT)
Dept: PLASTIC SURGERY | Facility: CLINIC | Age: 57
End: 2022-03-16
Payer: OTHER GOVERNMENT

## 2022-03-16 VITALS
HEIGHT: 59 IN | DIASTOLIC BLOOD PRESSURE: 71 MMHG | SYSTOLIC BLOOD PRESSURE: 113 MMHG | WEIGHT: 130 LBS | BODY MASS INDEX: 26.21 KG/M2 | HEART RATE: 75 BPM

## 2022-03-16 DIAGNOSIS — Z98.890 S/P BILATERAL BREAST REDUCTION: Primary | ICD-10-CM

## 2022-03-16 PROCEDURE — 99213 OFFICE O/P EST LOW 20 MIN: CPT | Mod: S$PBB,,, | Performed by: PHYSICIAN ASSISTANT

## 2022-03-16 PROCEDURE — 99213 PR OFFICE/OUTPT VISIT, EST, LEVL III, 20-29 MIN: ICD-10-PCS | Mod: S$PBB,,, | Performed by: PHYSICIAN ASSISTANT

## 2022-03-16 PROCEDURE — 99213 OFFICE O/P EST LOW 20 MIN: CPT | Mod: PBBFAC | Performed by: PHYSICIAN ASSISTANT

## 2022-03-16 PROCEDURE — 99999 PR PBB SHADOW E&M-EST. PATIENT-LVL III: CPT | Mod: PBBFAC,,, | Performed by: PHYSICIAN ASSISTANT

## 2022-03-16 PROCEDURE — 99999 PR PBB SHADOW E&M-EST. PATIENT-LVL III: ICD-10-PCS | Mod: PBBFAC,,, | Performed by: PHYSICIAN ASSISTANT

## 2022-03-16 NOTE — PROGRESS NOTES
Trinity Morin presents to Plastic Surgery Clinic on 3/16/2022 for a follow up visit status post bilateral breast reduction on 2021 by Dr. Kevin Diaz. She is doing well today with no issues since her last visit.     She denies fever, chills, nausea, vomiting or other systemic signs of infection.     Review of patient's allergies indicates:   Allergen Reactions    Nitrofurantoin monohyd/m-cryst Hives    Sulfamethoxazole-trimethoprim Anaphylaxis, Itching, Rash, Swelling and Hives    [facility]xtrom[facility]orphan      [facility]    Macrodantin [nitrofurantoin macrocrystalline]     Rifampin      [facility] record    Sulfa (sulfonamide antibiotics)        Current Outpatient Medications on File Prior to Visit   Medication Sig Dispense Refill    ALBUTEROL INHL Inhale into the lungs 4 (four) times daily as needed.      B cmplx 4/vit D3/C/folic/zinc (VITAL-D RX ORAL) Take by mouth once daily. States takes VIT D5      cyanocobalamin, vitamin B-12, (VITAMIN B-12) 50 mcg tablet Take 50 mcg by mouth once daily.      vitamin E acetate (VITAMIN E ORAL) Take by mouth.       No current facility-administered medications on file prior to visit.       Patient Active Problem List   Diagnosis    Pre-op testing       Past Surgical History:   Procedure Laterality Date    CERVICAL BIOPSY       SECTION      EXPLORATORY LAPAROTOMY      FOOT SURGERY      REDUCTION OF BOTH BREASTS Bilateral 2021    Procedure: breast reduction;  Surgeon: Kevin Diaz MD;  Location: [facility] OR [address] [place] [address];  Service: Plastics;  Laterality: Bilateral;  LEFT BREAST: 720G  RIGHT BREAST: 728G    TUBAL LIGATION           Social History     Socioeconomic History    Marital status: Single   Tobacco Use    Smoking status: Never Smoker    Smokeless tobacco: Never Used   Substance and Sexual Activity    Alcohol use: Yes     Comment: socially     Drug use: Never     Date of surgery 2021  Preoperative diagnosis  1.   Macromastia  2.  BRCA positive  Postoperative diagnosis same  Procedure performed bilateral breast reduction  Surgeon Joe  Anesthesia general  Complications none  Blood loss approximately 100 cc  Drains x2     1.  Left breast tissue, excision:   Breast tissue with fibrocystic changes and skin   No evidence of malignancy   2.  Right breast tissue, excision:   Breast tissue with fibrocystic changes including fibroadenomatoid nodules and   skin   No evidence of malignancy   3.  Left breast mass, excision:   Breast tissue with fibrocystic changes, hemorrhage, and fat necrosis   No evidence of malignancy      ROS - negative, other than stated above    PHYSICAL EXAMINATION  Vitals:    03/16/22 0958   BP: 113/71   Pulse: 75     WD WN NAD  VSS  Normal respiratory effort  R breast - incision CDI, no erythema or drainage, nipple viable + sensation, no palpable mass or skin change  L breast - incision CDI, no erythema or drainage, nipple viable + sensation, no palpable mass or skin change    ASSESSMENT/PLAN  56 y.o. F s/p bilateral breast reduction   - Doing well, no issues. Patient is pleased with the results of her surgery  - Bilateral breast are soft and symmetric  - Encouraged to get breast MRI/Mammogram as recommended by her breast surveillance team  - RTC PRN  All questions were answered. The patient was advised to call the clinic with any questions or concerns prior to their next visit.         Jessica Maurice PA-C  Certified Physician Assistant  Department of Plastic and Reconstructive Surgery  [phone] (office)

## 2022-11-28 ENCOUNTER — OFFICE VISIT (OUTPATIENT)
Dept: OTOLARYNGOLOGY | Facility: CLINIC | Age: 57
End: 2022-11-28
Payer: OTHER GOVERNMENT

## 2022-11-28 VITALS — HEART RATE: 59 BPM | SYSTOLIC BLOOD PRESSURE: 128 MMHG | DIASTOLIC BLOOD PRESSURE: 75 MMHG

## 2022-11-28 DIAGNOSIS — J38.2 VOCAL FOLD NODULES: ICD-10-CM

## 2022-11-28 DIAGNOSIS — R49.0 DYSPHONIA: Primary | ICD-10-CM

## 2022-11-28 DIAGNOSIS — D14.1 LARYNX NEOPLASM BENIGN: ICD-10-CM

## 2022-11-28 DIAGNOSIS — J38.5 LARYNGEAL SPASM: ICD-10-CM

## 2022-11-28 PROCEDURE — 99203 OFFICE O/P NEW LOW 30 MIN: CPT | Mod: 25,S$PBB,, | Performed by: OTOLARYNGOLOGY

## 2022-11-28 PROCEDURE — 88321 CONSLTJ&REPRT SLD PREP ELSWR: CPT | Performed by: PATHOLOGY

## 2022-11-28 PROCEDURE — 99999 PR PBB SHADOW E&M-EST. PATIENT-LVL IV: CPT | Mod: PBBFAC,,, | Performed by: OTOLARYNGOLOGY

## 2022-11-28 PROCEDURE — 31579 LARYNGOSCOPY TELESCOPIC: CPT | Mod: S$PBB,,, | Performed by: OTOLARYNGOLOGY

## 2022-11-28 PROCEDURE — 31579 LARYNGOSCOPY TELESCOPIC: CPT | Mod: PBBFAC | Performed by: OTOLARYNGOLOGY

## 2022-11-28 PROCEDURE — 99999 PR PBB SHADOW E&M-EST. PATIENT-LVL IV: ICD-10-PCS | Mod: PBBFAC,,, | Performed by: OTOLARYNGOLOGY

## 2022-11-28 PROCEDURE — 99214 OFFICE O/P EST MOD 30 MIN: CPT | Mod: PBBFAC,25 | Performed by: OTOLARYNGOLOGY

## 2022-11-28 PROCEDURE — 99203 PR OFFICE/OUTPT VISIT, NEW, LEVL III, 30-44 MIN: ICD-10-PCS | Mod: 25,S$PBB,, | Performed by: OTOLARYNGOLOGY

## 2022-11-28 PROCEDURE — 31579 PR LARYNGOSCOPY, FLEX/RIGID TELESCOPIC, W/STROBOSCOPY: ICD-10-PCS | Mod: S$PBB,,, | Performed by: OTOLARYNGOLOGY

## 2022-11-28 NOTE — PATIENT INSTRUCTIONS
BENIGN VOCAL FOLD LESIONS     What are benign vocal fold lesions?    Benign vocal fold lesions are non-cancerous growths that may or may not affect voice quality.     Nodules and polyps are common growths that develop at the middle of the vocal folds. Mature nodules are similar to calluses within the vocal fold tissues, usually on both sides. Polyps tend to be more fluid-filled than nodules and may have visible blood vessels feeding into them. Polyps can be on one or both sides. Typically, symptoms for both lesions include hoarseness, effortful voice, and rapid vocal fatigue.    Both nodules and polyps are most commonly caused by vocal fold trauma during voice use (talking or singing). The middle of the vocal folds receives the greatest impact during phonation, which is why nodules and polyps are most likely to develop there.  Smoking, alcohol use, caffeine intake, drying medications, allergies, exposure to chemicals, and reflux may also increase the likelihood that nodules or polyps will develop.    Cysts in the vocal folds are fluid-filled sacs surrounded by a layer of skin. A cyst typically forms in the middle of one vocal cord and causes reactive swelling on the other. Cysts are also likely caused by the forceful contact of the vocal folds.     How are they treated?    For nodules, voice therapy is the first line of treatment. Since nodules are commonly caused by trauma to the vocal folds, voice therapy works to reduce the amount of high-impact contact of the vocal folds, as well as to improve overall voice hygiene and maintenance. In some cases, voice therapy improves voice quality--but does not resolve the problem completely. In these cases, patients may discuss the possibility of microsurgery with their laryngologist.    Like with nodules, voice therapy may be the first line of treatment for polyps. However, in some cases, surgical removal of the polyp is recommended first, followed by a course  of voice therapy to ensure continued healthy, efficient voice use.    Cysts, unlike other types of benign lesions, do not go away with voice therapy alone. However, voice therapy is still a part of the treatment for 1) reduction of vocal fold swelling which will likely improve symptoms, and 2) differential diagnosis: if a lesion goes away with voice therapy, it was most likely not a cyst; if it does go away, it may be a cyst, and surgery may be appropriate.           WHAT TO EXPECT FROM VOICE THERAPY    Purpose  The purpose of voice therapy is to help you find a better, more efficient way to use your voice or to reduce symptoms such as coughing, throat clearing, or difficulty breathing.  Depending on your symptoms, you may learn how to produce clearer voice quality, how to reduce fatigue or pain associated with speaking, how to take care of your voice, and how to eliminate chronic coughing or throat clearing.      Process: Evaluation  First, you may go through some initial testing.  In most cases, a videostroboscopy will be performed in order to allow your speech pathologist and your physician to look at your vocal cords to aid in deciding if you would benefit from voice therapy.  Next, you may work with the speech pathologist to assess the current capabilities of your voice.  Following evaluation, your speech pathologist will design a therapeutic plan to improve your voice as well as other symptoms that may bother you.  At the time of evaluation, your speech pathologist may provide you with exercises to try at home.      Process: Therapy  Most patients benefit from 2-8 sessions over 1-3 months.  Voice therapy involves changing the behavior of your vocal cords and speaking habits, so it is very important that you attend your appointments and do home practices as instructed by your speech pathologist.  Home practice and participation in therapy are critical to meeting your desired voice goals, so of course, we are  able to work with you to schedule appointments that are convenient for you.          VOICE REST FOLLOWING SURGERY     After having laryngeal (voice box) surgery, your voice needs complete rest in order to heal. At your follow-up appointment, which will be 4-6 days later, we will look at your larynx and see how it is healing. Then we will discuss a modified voice rest plan to gradually increase your voice use. This schedule is a basic guideline; specific time periods for complete and modified voice rest will be tailored to you.     OVERALL GUIDELINES FOR VOICE REST   Avoid coughing or throat clearing. If you feel the need to clear your throat, take a sip of water and swallow hard.   Avoid strenuous exercise or activity. Any noise or straining activity to your vocal cords during this time can be damaging and affect how your vocal folds heal.   Remain hydrated. Drink 8-10 glasses of water per day. Avoid caffeine, alcohol, and mentholated cough drops.   Breathe steam several times per day, either from your shower, sink, or a personal humidifier.   If you have reflux, follow diet precautions and take medicine as prescribed.   Avoid first- and second-hand smoke.     IMMEDIATELY AFTER SURGERY:   Week 1 following surgery  Complete voice rest  AFTER FOLLOW-UP APPOINTMENT:   Week 2 following surgery  Modified voice rest      Avoid talking, whispering, throat clearing, coughing, singing, humming, laughing, whistling, or playing musical instruments.   Use pen and paper to write message, or try an val on your smart phone/tablet. -- iPhone apps: RADSONE, [employer] [place] Text to Speech.  Android apps: Text to Speech toy.  Arrange for appropriate  support.   Change your outgoing voicemail message to redirect callers to email or text. Days 8-9: 5-10 minutes of voicing per hour, or voice use for business of life only   Days 10-11: 10-15 minutes of voicing per hour, or voice use for short sentences   Days 12-13: 15-20  minutes of voicing per hour, or voice use for short paragraphs   Days 14-15: 20-30 minutes of voicing per hour   Days 16-17: Ramp up voice use to 80% of normal use. Be sure to take 10-minute voice naps each hour.        Keep in mind that this is a personalized progression. If you have any trouble, back up and do not progress until you are ready. Do not keep talking if your voice wears out or feels tired.           MICROLARYNGOSCOPY    Description  Laryngoscopy is a procedure in which the surgeon closely examines the larynx (voice box). The key instrument for laryngoscopy is the laryngoscope, which is a hollow metal tube inserted into the mouth. Microlaryngoscopy entails--at minimum--a magnified examination of the larynx using a microscope and/or telescopes. This is performed in the operating room. This allows the surgeon to achieve a diagnosis and also to perform precise treatment for problems on the vocal folds (vocal cords) or other parts of the larynx. Additional interventions may be performed in conjunction with microlaryngoscopy. Common interventions include but are not limited to  Biopsy  Removal of abnormal tissue (polyps, cysts, nodules, leukoplakia)  Resection of cancer  Laser treatment of abnormal tissue (papilloma, leukoplakia)  Vocal fold injection augmentation  Injection of medication (steroid, Avastin)    What to expect during the procedure  Microlaryngoscopy is performed in the operating room while you are asleep under general anesthesia. In most instances, you can expect to be under general anesthesia for approximately 1 to 1 ½ hours.  Nevertheless, the duration of the procedure varies depending on the indication for surgery, intraoperative findings, and other patient-specific/anatomic issues. Our primary concerns are your safety and comfort. Our secondary goal is to provide you with the best possible surgical treatment for your problem. Your surgery will take as long as necessary to accomplish  these goals.    What to expect afterwards  The laryngoscope is inserted through the mouth and presses on the tongue. The most common postoperative issues patients encounter are related to the laryngoscope being positioned in the mouth. The extent of these issues is related to patient-specific anatomic issues, the indications for surgery, and the duration of the procedure.    Pain. We will do everything we can to make sure you are as comfortable as possible. Most patients experience very little discomfort after this surgery. Nevertheless, you should expect some soreness in the mouth and throat. Because the ear and the throat share the same sensory nerve, you may also experience some discomfort in the ear. The discomfort is usually worst for the first 48-72 hours and usually resolves within a week.     Laceration. Some patients may notice a small cut in the tongue or in another part of the mouth or throat. This may result in a minor about of blood-tinged saliva for the first 24 hours. This usually heals on its own over the course of a week.    Other tongue problems. As the scope presses down on the tongue, the taste buds get compressed. In addition, sometimes the nerves to the tongue also get compressed. As a result, some patients notice a disturbance in taste, numbness of the tongue, or (even more rarely) weakness of the tongue after surgery. Although sometimes it may take several weeks to months for the problem to completely go away, the tongue problems are temporary in almost every instance.    Tooth problems. Reinforced mouth guards are placed on the teeth to protect them during the surgery and we give a great deal of care and attention to minimizing any pressure on the teeth. However, a chipped or cracked tooth, loss of a tooth, and/or other tooth irregularities are rare but well-recognized complications of laryngoscopy.    Jaw problems. You may experience some pain in the jaw joints (in front of the ears). Even  less frequent would be dislocation of the joint. This usually occurs in patients who already have jaw problems.

## 2022-11-28 NOTE — PROGRESS NOTES
[facility]  Department of Otorhinolaryngology and Communication Sciences    Triinty Morin is a 57 y.o. female who presents to the [facility] for consultation at the kind request of [facility] for further management of hoarseness.     She complains of hoarseness.  She characterizes difficulty speaking and singing.  At times, her voice goes down to a whisper, but she never entirely loses it. Onset was gradual. Duration is a few years. Time course is constant. Symptoms are worsening. She denies any exacerbating factors. She denies any alleviating factors. She denies any associated symptoms.  She has asthma.  She uses an inhaler a few times every month.      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 She reports she does not sound like herself.  Sometimes voice sounds better than others but has not sounded normal for a while.      No QOL    Past Medical History  She has a past medical history of Asthma, General anesthetics causing adverse effect in therapeutic use, and Sleep apnea.    Past Surgical History  She has a past surgical history that includes  section; Tubal ligation; Exploratory laparotomy; Cervical biopsy; Foot surgery; and Reduction of both breasts (Bilateral, 2021).    Family History  Her family history is not on file.    Social History  She reports that she has never smoked. She has never used smokeless tobacco. She reports current alcohol use. She reports that she does not use drugs.    Allergies  She is allergic to nitrofurantoin monohyd/m-cryst, sulfamethoxazole-trimethoprim, dextromethorphan, macrodantin [nitrofurantoin macrocrystalline], rifampin, and sulfa (sulfonamide antibiotics).    Medications  She has a current medication list which includes the following prescription(s): albuterol, b cmplx 4/vit d3/c/folic/zinc, cyanocobalamin (vitamin b-12), and vitamin e acetate.    Review of  Systems   Constitutional:  Negative for fever.   HENT:  Negative for sore throat.    Eyes:  Negative for visual disturbance.   Respiratory:  Negative for wheezing.    Cardiovascular:  Negative for chest pain.   Gastrointestinal:  Negative for nausea.   Musculoskeletal:  Negative for arthralgias.   Skin:  Negative for rash.   Neurological:  Negative for tremors.   Hematological:  Does not bruise/bleed easily.   Psychiatric/Behavioral:  The patient is not nervous/anxious.         Objective:     /75   Pulse (!) 59    Physical Exam  Constitutional: comfortable, well dressed  Psychiatric: appropriate affect  Respiratory: comfortably breathing, symmetric chest rise, no stridor  Voice: mild variable roughness/hardy; pitch breaks in upper resgiter  Cardiovascular: upper extremities non-edematous  Lymphatic: no cervical lymphadenopathy  Neurologic: alert and oriented to time, place, person, and situation; cranial nerves 3-12 grossly intact  Head: normocephalic  Eyes: conjunctivae and sclerae clear  Ears: normal pinnae, normal external auditory canals, tympanic membranes intact  Nose: mucosa pink and noncongested, no masses, no mucopurulence, no polyps  Oral cavity / oropharynx: no mucosal lesions  Neck: soft, full range of motion, laryngotracheal complex palpable with appropriate landmarks, larynx elevates on swallowing  Indirect laryngoscopy: limited due to gag    Procedure  Rigid Laryngeal Videostroboscopy (18784): Laryngeal videostroboscopy is indicated to assess the laryngeal vibratory biomechanics and vocal fold oscillation, which cannot be assessed with a plain light examination. This was carried out with a 70 degree endoscope. After verbal consent was obtained, the patient was positioned and the tongue was gently secured with a gauze sponge. The endoscope was passed transorally and positioned to image the larynx and hypopharynx in detail. The following features were examined: laryngeal and hypopharyngeal masses;  vocal fold range and symmetry of motion; laryngeal mucosal edema, erythema, inflammation, and hydration; salivary pooling; and gross laryngeal sensation. During phonation, the vocal folds were assessed for glottal closure; mucosal wave; vocal fold lesions; vibratory periodicity, amplitude, and phase symmetry; and vertical height match. The equipment was removed. The patient tolerated the procedure well without complication. All findings were normal except:  - left vocal fold with sessile convexity along free edge, middle third  - smaller convexity along free edge of right vocal fold, middle third  - premature contact, hourglass closure, pliability intact  - mild insufficiency with A/P gap in uppermost register      Assessment:     Trinity Morin is a 57 y.o. 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      The patient is reassured that these symptoms do not appear to represent a serious or threatening condition.    SLP voice evaluation and subsequent therapy sessions are medically necessary for restoration of laryngeal function. We will arrange for this to occur here at the [facility] in the coming weeks.    Depending on her progress, or lack thereof, micro laryngeal surgical intervention could be considered.  I discussed with her such a surgery and recovery in general terms.    I gave the patient the opportunity to ask questions and I answered all of them.    She will follow up with me in about 3 month(s), or sooner if needed.    All questions were answered, and the patient is in agreement with the above.     Brice Vazquez M.D.  [facility]  Department of Otorhinolaryngology and Communication Sciences

## 2022-12-07 ENCOUNTER — DOCUMENTATION ONLY (OUTPATIENT)
Dept: HEMATOLOGY/ONCOLOGY | Facility: CLINIC | Age: 57
End: 2022-12-07
Payer: OTHER GOVERNMENT

## 2022-12-08 ENCOUNTER — DOCUMENTATION ONLY (OUTPATIENT)
Dept: HEMATOLOGY/ONCOLOGY | Facility: CLINIC | Age: 57
End: 2022-12-08
Payer: OTHER GOVERNMENT

## 2022-12-08 NOTE — NURSING
I spoke with the pt at length about the process of gathering records to be able to schedule her appropriately.  She insisted that I had everything I needed to schedule her with a physician so that she could find out what is going on.  I explained the need to see a medical oncologist and possibly a radiation oncologist, as well as a surgeon.  Explained that without images, the appt may only give her possible treatment options, that she may have to return for additional appts, which would prolong her starting treatment.  The pt told me that she was going to take her care somewhere she would be treated with respect and disconnected the call.

## 2022-12-09 ENCOUNTER — TELEPHONE (OUTPATIENT)
Dept: GYNECOLOGIC ONCOLOGY | Facility: CLINIC | Age: 57
End: 2022-12-09
Payer: OTHER GOVERNMENT

## 2022-12-09 NOTE — TELEPHONE ENCOUNTER
Spoke with our patient about her insurance, schedule appointment she voiced understanding of the date, time and location. All questions answered . Provider Scheduling Coord.  Gynecologic Oncology MA/PAR /Preceptor Silver Macario

## 2022-12-09 NOTE — TELEPHONE ENCOUNTER
----- Message from Alvarado Balderas sent at 12/9/2022  9:31 AM CST -----  Name Of Caller: Trinity        Provider Name: needs to est care        Does patient feel the need to be seen today? no        Relationship to the Pt?: patient        Contact Preference?: [phone]        What is the nature of the call?: Patient states that she would like to est care with this provider to get an ovariectomy

## 2022-12-12 ENCOUNTER — TELEPHONE (OUTPATIENT)
Dept: GYNECOLOGIC ONCOLOGY | Facility: CLINIC | Age: 57
End: 2022-12-12
Payer: OTHER GOVERNMENT

## 2022-12-12 ENCOUNTER — TELEPHONE (OUTPATIENT)
Dept: HEMATOLOGY/ONCOLOGY | Facility: CLINIC | Age: 57
End: 2022-12-12
Payer: OTHER GOVERNMENT

## 2022-12-12 ENCOUNTER — HOSPITAL ENCOUNTER (OUTPATIENT)
Dept: RADIOLOGY | Facility: HOSPITAL | Age: 57
Discharge: HOME | End: 2022-12-12
Attending: SURGERY
Payer: OTHER GOVERNMENT

## 2022-12-12 ENCOUNTER — OFFICE VISIT (OUTPATIENT)
Dept: GYNECOLOGIC ONCOLOGY | Facility: CLINIC | Age: 57
End: 2022-12-12
Payer: OTHER GOVERNMENT

## 2022-12-12 VITALS
SYSTOLIC BLOOD PRESSURE: 124 MMHG | HEART RATE: 76 BPM | BODY MASS INDEX: 27.89 KG/M2 | WEIGHT: 138.13 LBS | DIASTOLIC BLOOD PRESSURE: 66 MMHG

## 2022-12-12 DIAGNOSIS — Z15.09 BRCA2 GENE MUTATION POSITIVE IN FEMALE: ICD-10-CM

## 2022-12-12 DIAGNOSIS — Z15.02 BRCA2 GENE MUTATION POSITIVE IN FEMALE: ICD-10-CM

## 2022-12-12 DIAGNOSIS — Z15.01 BRCA2 GENE MUTATION POSITIVE IN FEMALE: ICD-10-CM

## 2022-12-12 DIAGNOSIS — Z85.3 HISTORY OF BREAST CANCER IN FEMALE: Primary | ICD-10-CM

## 2022-12-12 PROCEDURE — 77066 PR MAMMO, CAD, DIAGNOSTIC, BILAT: ICD-10-PCS | Mod: 26,,, | Performed by: RADIOLOGY

## 2022-12-12 PROCEDURE — 77047 MRI BREAST C- BILATERAL: CPT | Mod: 26,,, | Performed by: RADIOLOGY

## 2022-12-12 PROCEDURE — 99999 PR PBB SHADOW E&M-EST. PATIENT-LVL III: CPT | Mod: PBBFAC,,, | Performed by: OBSTETRICS & GYNECOLOGY

## 2022-12-12 PROCEDURE — 99205 PR OFFICE/OUTPT VISIT, NEW, LEVL V, 60-74 MIN: ICD-10-PCS | Mod: S$PBB,,, | Performed by: OBSTETRICS & GYNECOLOGY

## 2022-12-12 PROCEDURE — 19083 BX BREAST 1ST LESION US IMAG: CPT | Mod: LT,,, | Performed by: RADIOLOGY

## 2022-12-12 PROCEDURE — 76642 ULTRASOUND BREAST LIMITED: CPT | Mod: 26,RT,, | Performed by: RADIOLOGY

## 2022-12-12 PROCEDURE — 77066 DX MAMMO INCL CAD BI: CPT | Mod: 26,,, | Performed by: RADIOLOGY

## 2022-12-12 PROCEDURE — 19084 PR BX BRST, EA ADD'L LESION, US GUIDANCE: ICD-10-PCS | Mod: RT,,, | Performed by: RADIOLOGY

## 2022-12-12 PROCEDURE — 19083 PR BX BRST, 1ST LESION, US GUIDANCE: ICD-10-PCS | Mod: LT,,, | Performed by: RADIOLOGY

## 2022-12-12 PROCEDURE — 99213 OFFICE O/P EST LOW 20 MIN: CPT | Mod: PBBFAC | Performed by: OBSTETRICS & GYNECOLOGY

## 2022-12-12 PROCEDURE — 77047 PR MRI, BREAST, W/O CONTRAST, BILATERAL: ICD-10-PCS | Mod: 26,,, | Performed by: RADIOLOGY

## 2022-12-12 PROCEDURE — 99999 PR PBB SHADOW E&M-EST. PATIENT-LVL III: ICD-10-PCS | Mod: PBBFAC,,, | Performed by: OBSTETRICS & GYNECOLOGY

## 2022-12-12 PROCEDURE — 19084 BX BREAST ADD LESION US IMAG: CPT | Mod: RT,,, | Performed by: RADIOLOGY

## 2022-12-12 PROCEDURE — 99205 OFFICE O/P NEW HI 60 MIN: CPT | Mod: S$PBB,,, | Performed by: OBSTETRICS & GYNECOLOGY

## 2022-12-12 PROCEDURE — 76642 PR US BREAST UNILAT LIMITED: ICD-10-PCS | Mod: 26,RT,, | Performed by: RADIOLOGY

## 2022-12-12 NOTE — TELEPHONE ENCOUNTER
Spoke with our patient about her reschedule appointment she voiced understanding of the date, time and location. All questions answered . Provider Scheduling Coord.  Gynecologic Oncology MA/PAR /Preceptor Silver Macario

## 2022-12-12 NOTE — PROGRESS NOTES
Subjective:       Patient ID: Trinity Morin is a 57 y.o. female.    Chief Complaint: Advice Only (New pt)    57 year old  female presents to clinic for evaluation of a BRCA 2 mutation. Patient was diagnosed with a BRCA mutation in [place], [place] in . She reports at that time, she elected to continue surveillance rather than surgery. She reports doing ca125 lab draws and ultrasounds every 6 months. She reports she has not had an ultra sound since the pandemic.     Patient has recently diagnosed with breast cancer. She had a breast biopsy at [facility]. She is attempting to transfer her care to [facility]. She has not met with the breast team or medical oncology yet.     LMP- 2013, no HRT    Co morbidities include hyperlipidemia, prediabetes (A1c- 5.9)    Pertinent surgical history includes BTL,  x 1.    Reports Sister was diagnosed with breast cancer in her 40s.   Review of Systems   Constitutional:  Negative for chills and fever.   HENT:  Negative for mouth sores.    Respiratory:  Negative for chest tightness and shortness of breath.    Cardiovascular:  Negative for chest pain.   Gastrointestinal:  Negative for abdominal distention, nausea and vomiting.   Genitourinary:  Negative for dysuria, hematuria, pelvic pain, vaginal bleeding and vaginal discharge.   Neurological:  Negative for syncope and weakness.       Objective:    /66   Pulse 76   Wt 62.6 kg (138 lb 1.6 oz)   BMI 27.89 kg/m²     Physical Exam  Vitals reviewed. Exam conducted with a chaperone present.   Constitutional:       Appearance: Normal appearance.   HENT:      Head: Normocephalic and atraumatic.   Eyes:      Extraocular Movements: Extraocular movements intact.      Conjunctiva/sclera: Conjunctivae normal.      Pupils: Pupils are equal, round, and reactive to light.   Pulmonary:      Effort: Pulmonary effort is normal. No respiratory distress.   Abdominal:      General: There is no distension.      Palpations: Abdomen is soft.       Tenderness: There is no abdominal tenderness.      Hernia: There is no hernia in the left inguinal area or right inguinal area.   Genitourinary:     Labia:         Right: No rash.         Left: No rash.       Vagina: Normal.      Cervix: No cervical motion tenderness or discharge.      Uterus: Normal. Not deviated, not enlarged and not fixed.       Adnexa: Right adnexa normal and left adnexa normal.        Right: No mass.          Left: No mass.        Rectum: Normal.   Musculoskeletal:         General: Normal range of motion.   Skin:     General: Skin is warm and dry.   Neurological:      General: No focal deficit present.      Mental Status: She is alert and oriented to person, place, and time. Mental status is at baseline.   Psychiatric:         Mood and Affect: Mood normal.         Behavior: Behavior normal.         Thought Content: Thought content normal.         Judgment: Judgment normal.       Assessment:       Problem List Items Addressed This Visit          Oncology    BRCA2 gene mutation positive in female       Plan:       Patient previously counseled on mutation and was undergoing serial surveillance in [place] despite recommendation for RRSO.  Now with breast cancer diagnosis attempting to transfer care to [facility].  Instructed her to receive care for active cancer first then we can proceed with surgery.  Unclear when last TVUS and CA-125 were performed so will order.  RTC 6 months.

## 2022-12-12 NOTE — TELEPHONE ENCOUNTER
----- Message from Astrid Perkins sent at 12/12/2022 10:01 AM CST -----  Please call pt she wants to know all that we have and DONT have

## 2022-12-13 ENCOUNTER — PATIENT MESSAGE (OUTPATIENT)
Dept: HEMATOLOGY/ONCOLOGY | Facility: CLINIC | Age: 57
End: 2022-12-13
Payer: OTHER GOVERNMENT

## 2022-12-13 PROBLEM — Z15.09 BRCA2 GENE MUTATION POSITIVE IN FEMALE: Status: ACTIVE | Noted: 2022-12-13

## 2022-12-13 PROBLEM — Z15.02 BRCA2 GENE MUTATION POSITIVE IN FEMALE: Status: ACTIVE | Noted: 2022-12-13

## 2022-12-13 PROBLEM — Z15.01 BRCA2 GENE MUTATION POSITIVE IN FEMALE: Status: ACTIVE | Noted: 2022-12-13

## 2022-12-13 NOTE — TELEPHONE ENCOUNTER
Called pt & confirmed appt with  tomorrow morning at 8:30am. Also sent her a portal message with the breast medical oncologists here at [facility] to review. She has my number for any future needs.

## 2022-12-13 NOTE — TELEPHONE ENCOUNTER
Spoke with pt, answered her questions & further explained our processes. Informed her that we received her images today from [facility] & that they are being reviewed by our radiology team. She then asked about her slides. Informed her that they are on their way from [facility] & that once received I will walk them to our pathology dept for review. She wants both of these things completed before she sees . I did offer an appt sooner with another breast surgeon but she is specifically requesting . Scheduled on 12-27-22 & confirmed. She reports that she has an appt tomorrow with , I sent a message to them to confirm & will call pt back once I have that. I provided her with my direct number for any future needs or follow up.

## 2022-12-13 NOTE — NURSING
Oncology Navigation   Intake  Date of Diagnosis: 11/17/22  Cancer Type: Breast  Internal / External Referral: External  Date of Referral: 12/02/22  Initial Nurse Navigator Contact: 12/13/22  Referral to Initial Contact Timeline (days): 11  Date Worked: 12/13/22  First Appointment Available: 12/27/22  Appointment Date: 12/27/22  First Available Date vs. Scheduled Date (days): 0  Multiple appointments: Yes     Treatment  Current Status: Staging work-up    Surgical Oncologist: Dr.Amy Jiménez  Consult Date: 12/27/22          Procedures: Biopsy; MRI; Genetic test  Biopsy Schedule Date: 11/17/22  Genetic Testing Date Sent:  (pt reports completed 9yrs ago, trying to get results)  MRI Schedule Date: 05/27/22                 Acuity      Follow Up  No follow-ups on file.

## 2022-12-14 ENCOUNTER — TELEPHONE (OUTPATIENT)
Dept: PLASTIC SURGERY | Facility: CLINIC | Age: 57
End: 2022-12-14
Payer: OTHER GOVERNMENT

## 2022-12-14 ENCOUNTER — OFFICE VISIT (OUTPATIENT)
Dept: PLASTIC SURGERY | Facility: CLINIC | Age: 57
End: 2022-12-14
Payer: OTHER GOVERNMENT

## 2022-12-14 VITALS — OXYGEN SATURATION: 100 % | DIASTOLIC BLOOD PRESSURE: 71 MMHG | HEART RATE: 63 BPM | SYSTOLIC BLOOD PRESSURE: 138 MMHG

## 2022-12-14 DIAGNOSIS — Z15.09 BRCA GENE MUTATION POSITIVE: ICD-10-CM

## 2022-12-14 DIAGNOSIS — Z09 SURGERY FOLLOW-UP EXAMINATION: Primary | ICD-10-CM

## 2022-12-14 DIAGNOSIS — Z15.01 BRCA GENE MUTATION POSITIVE: ICD-10-CM

## 2022-12-14 DIAGNOSIS — Z98.890 S/P BILATERAL BREAST REDUCTION: Primary | ICD-10-CM

## 2022-12-14 DIAGNOSIS — Z85.3 PERSONAL HISTORY OF BREAST CANCER: ICD-10-CM

## 2022-12-14 PROCEDURE — 99999 PR PBB SHADOW E&M-EST. PATIENT-LVL III: ICD-10-PCS | Mod: PBBFAC,,, | Performed by: SURGERY

## 2022-12-14 PROCEDURE — 99214 OFFICE O/P EST MOD 30 MIN: CPT | Mod: S$PBB,,, | Performed by: SURGERY

## 2022-12-14 PROCEDURE — 99214 PR OFFICE/OUTPT VISIT, EST, LEVL IV, 30-39 MIN: ICD-10-PCS | Mod: S$PBB,,, | Performed by: SURGERY

## 2022-12-14 PROCEDURE — 99213 OFFICE O/P EST LOW 20 MIN: CPT | Mod: PBBFAC | Performed by: SURGERY

## 2022-12-14 PROCEDURE — 99999 PR PBB SHADOW E&M-EST. PATIENT-LVL III: CPT | Mod: PBBFAC,,, | Performed by: SURGERY

## 2022-12-14 NOTE — TELEPHONE ENCOUNTER
Called pt in regards to scheduling an appt on 12/29 at 3:15pm at [facility] to see Dr Patricio GAMEZ with call back #

## 2022-12-14 NOTE — LETTER
[facility] 2nd Floor  [address]  [facility] [address]  Phone: [phone]  Fax: [phone] December 23, 2022        Kim Jiménez MD  [address]  [facility] [address]    Patient: Trinity Morin   MR Number: [id number]   YOB: 1965   Date of Visit: 12/14/2022     Dear Dr. Jiménez:    Thank you for referring Trinity Morin to me for evaluation. Attached are the relevant portions of my assessment and plan of care.    If you have questions, please do not hesitate to call me. I look forward to following Trinity along with you.    Sincerely,    Kevin Diaz MD   Section of Plastic Surgery  Department of Surgery  [facility]    CRB/hcr    CC  Lorena Fuentes MD

## 2022-12-14 NOTE — PROGRESS NOTES
Patient presents Plastic surgery Clinic after having a bilateral breast reduction 18 months ago.  Patient was known to have BRCA at that time.  I consulted with breast surgery prior to doing the reduction.  Patient had refused a bilateral mastectomy at the time.  Patient did well from her breast reduction but now presents with a breast cancer on the left side.  She has fat necrosis in the nhgia areolar area on the right side.  Most of her treatment I believe has been done at the [facility].  I think that after having a long discussion with the patient she is more interested in having bilateral JUNAID flaps.  She is healthy enough for that.  She does have enough fat for that.  She does not smoke.  First and foremost, she will need to see breast surgery which she has not been here oxygen.  We have put out a referral.  She will also see Dr. Curry for flap reconstruction.

## 2022-12-15 ENCOUNTER — TELEPHONE (OUTPATIENT)
Dept: PLASTIC SURGERY | Facility: CLINIC | Age: 57
End: 2022-12-15
Payer: OTHER GOVERNMENT

## 2022-12-15 NOTE — TELEPHONE ENCOUNTER
Contacted Pt to inform them of their newly scheduled appointment time. Left a message and number where pt could contact me back to receive this information.

## 2022-12-16 ENCOUNTER — TELEPHONE (OUTPATIENT)
Dept: HEMATOLOGY/ONCOLOGY | Facility: CLINIC | Age: 57
End: 2022-12-16
Payer: OTHER GOVERNMENT

## 2022-12-16 DIAGNOSIS — C50.919 BREAST CANCER: Primary | ICD-10-CM

## 2022-12-16 NOTE — TELEPHONE ENCOUNTER
Attempted to call pt to schedule medical oncology, she is requesting . No answer, left voicemail for return call.

## 2022-12-16 NOTE — TELEPHONE ENCOUNTER
Per pt request, scheduled to see  on 12-22-22 at 11am. Moved her appt with  from 12-27-22 to 12-29-22 to coordinate with 's appt. Reviewed all upcoming appts with pt & confirmed.

## 2022-12-19 ENCOUNTER — PATIENT MESSAGE (OUTPATIENT)
Dept: SPEECH THERAPY | Facility: HOSPITAL | Age: 57
End: 2022-12-19
Payer: OTHER GOVERNMENT

## 2022-12-20 ENCOUNTER — HOSPITAL ENCOUNTER (OUTPATIENT)
Dept: RADIOLOGY | Facility: OTHER | Age: 57
Discharge: HOME | End: 2022-12-20
Attending: OBSTETRICS & GYNECOLOGY
Payer: OTHER GOVERNMENT

## 2022-12-20 DIAGNOSIS — Z85.3 HISTORY OF BREAST CANCER IN FEMALE: ICD-10-CM

## 2022-12-20 PROBLEM — Z17.1 MALIGNANT NEOPLASM OF UPPER-OUTER QUADRANT OF LEFT BREAST IN FEMALE, ESTROGEN RECEPTOR NEGATIVE: Status: ACTIVE | Noted: 2022-12-20

## 2022-12-20 PROBLEM — C50.412 MALIGNANT NEOPLASM OF UPPER-OUTER QUADRANT OF LEFT BREAST IN FEMALE, ESTROGEN RECEPTOR NEGATIVE: Status: ACTIVE | Noted: 2022-12-20

## 2022-12-20 PROCEDURE — 76830 TRANSVAGINAL US NON-OB: CPT | Mod: 26,,, | Performed by: RADIOLOGY

## 2022-12-20 PROCEDURE — 76830 TRANSVAGINAL US NON-OB: CPT | Mod: TC

## 2022-12-20 PROCEDURE — 76856 US PELVIS COMP WITH TRANSVAG NON-OB (XPD): ICD-10-PCS | Mod: 26,,, | Performed by: RADIOLOGY

## 2022-12-20 PROCEDURE — 76830 US PELVIS COMP WITH TRANSVAG NON-OB (XPD): ICD-10-PCS | Mod: 26,,, | Performed by: RADIOLOGY

## 2022-12-20 PROCEDURE — 76856 US EXAM PELVIC COMPLETE: CPT | Mod: 26,,, | Performed by: RADIOLOGY

## 2022-12-20 NOTE — PROGRESS NOTES
Subjective:       Patient ID: Trinity Morin is a 57 y.o. female.    Chief Complaint: No chief complaint on file.    HPI 57-year-old female seen for recent diagnosis of left breast cancer.      She was diagnosed  BRCA2 positive in 2013 and had been under active surveillance.      [facility] Duncan at [facility].  MRI of the breast on May 27, 2022 showed a 6 x 7 mm irregular enhancing mass in the upper-outer quadrant of the left breast.      On June 22, 2020 22 follow-up ultrasound showed a 4 x 4 x 6 mm oval hypoechoic mass in the left breast at 3:00 a.m. position corresponding to the MRI findings.      On November 9, 2022 a follow-up mammogram and ultrasound were performed.  That imaging showed an irregular mass in the upper-outer quadrant of the left breast which by ultrasound had enlarged now measuring 6 x 10 x 6 mm.  There were no abnormal axillary lymph nodes noted.    The right breast showed some new calcifications measuring 4 mm in the upper-outer quadrant with focal asymmetry.      On November 17, 2022 biopsy of the right breast and left breast was performed.  The right breast biopsy was benign.    Left breast biopsy showed high-grade infiltrating ductal carcinoma (histologic grade 3, nuclear grade 3, mitotic index 3).    Tumor cells were estrogen receptor negative, progesterone receptor negative, and HER2F negative by FISH, Ki-67 was 78.5%    Today she reports that she has been feeling well.      Menstrual History:    Menarche - 14   G -  3 P -2   First birth age - 29,BCP - minimal    Menopause - 2013      HRT - no    Family History -                                 Breast - sister in her 40s                                    Social History :    Smoking -   never               ETOH -   5-6/week          Work -       PMH: asthma, sleep apnea      Review of Systems   Constitutional:  Negative for appetite change and unexpected weight change.   HENT:  Negative for mouth  sores.    Eyes:  Negative for visual disturbance.   Respiratory:  Negative for cough and shortness of breath.         Asthma -controlled with inhaler   Cardiovascular:  Negative for chest pain.   Gastrointestinal:  Negative for abdominal pain and diarrhea.   Genitourinary:  Negative for frequency.   Musculoskeletal:  Negative for back pain.   Integumentary:  Negative for rash.   Neurological:  Negative for headaches.   Hematological:  Negative for adenopathy.   Psychiatric/Behavioral:  The patient is not nervous/anxious.        Objective:      Physical Exam  Vitals reviewed.   Constitutional:       General: She is not in acute distress.     Appearance: Normal appearance.   Cardiovascular:      Rate and Rhythm: Normal rate and regular rhythm.   Pulmonary:      Effort: Pulmonary effort is normal. No respiratory distress.      Breath sounds: Normal breath sounds. No wheezing or rales.   Chest:   Breasts:     Right: Normal.      Left: Mass present. No nipple discharge, skin change or tenderness.       Abdominal:      Palpations: Abdomen is soft. There is no mass.      Tenderness: There is no abdominal tenderness.   Lymphadenopathy:      Cervical: No cervical adenopathy.      Upper Body:      Right upper body: No supraclavicular or axillary adenopathy.      Left upper body: No supraclavicular or axillary adenopathy.   Neurological:      Mental Status: She is alert and oriented to person, place, and time.   Psychiatric:         Mood and Affect: Mood normal.         Thought Content: Thought content normal.         Judgment: Judgment normal.       Assessment:       Problem List Items Addressed This Visit       BRCA2 gene mutation positive in female - Primary    Malignant neoplasm of upper-outer quadrant of left breast in female, estrogen receptor negative         Plan:       Based on imaging size of her breast cancer ,she should proceed with surgery and then with adjuvant chemotherapy.    If she is node negative, then 4  cycles of Taxotere plus Cytoxan would be the preferred approach.    I provided her with written information regarding those medications.      She has a Surgery appt 12/29/22.    See me post op.    Route Chart for Scheduling    Med Onc Chart Routing      Follow up with physician No follow up needed. will see after her surgery   Follow up with [facility]    Infusion scheduling note    Injection scheduling note    Labs    Imaging    Pharmacy appointment    Other referrals

## 2022-12-22 ENCOUNTER — OFFICE VISIT (OUTPATIENT)
Dept: HEMATOLOGY/ONCOLOGY | Facility: CLINIC | Age: 57
End: 2022-12-22
Payer: OTHER GOVERNMENT

## 2022-12-22 VITALS
BODY MASS INDEX: 27.62 KG/M2 | SYSTOLIC BLOOD PRESSURE: 121 MMHG | RESPIRATION RATE: 18 BRPM | TEMPERATURE: 98 F | DIASTOLIC BLOOD PRESSURE: 74 MMHG | WEIGHT: 137 LBS | HEIGHT: 59 IN | OXYGEN SATURATION: 98 % | HEART RATE: 72 BPM

## 2022-12-22 DIAGNOSIS — Z17.1 MALIGNANT NEOPLASM OF UPPER-OUTER QUADRANT OF LEFT BREAST IN FEMALE, ESTROGEN RECEPTOR NEGATIVE: ICD-10-CM

## 2022-12-22 DIAGNOSIS — Z15.01 BRCA2 GENE MUTATION POSITIVE IN FEMALE: Primary | ICD-10-CM

## 2022-12-22 DIAGNOSIS — Z15.02 BRCA2 GENE MUTATION POSITIVE IN FEMALE: Primary | ICD-10-CM

## 2022-12-22 DIAGNOSIS — Z15.09 BRCA2 GENE MUTATION POSITIVE IN FEMALE: Primary | ICD-10-CM

## 2022-12-22 DIAGNOSIS — C50.412 MALIGNANT NEOPLASM OF UPPER-OUTER QUADRANT OF LEFT BREAST IN FEMALE, ESTROGEN RECEPTOR NEGATIVE: ICD-10-CM

## 2022-12-22 PROCEDURE — 99204 OFFICE O/P NEW MOD 45 MIN: CPT | Mod: S$PBB,,, | Performed by: INTERNAL MEDICINE

## 2022-12-22 PROCEDURE — 99213 OFFICE O/P EST LOW 20 MIN: CPT | Mod: PBBFAC | Performed by: INTERNAL MEDICINE

## 2022-12-22 PROCEDURE — 99999 PR PBB SHADOW E&M-EST. PATIENT-LVL III: ICD-10-PCS | Mod: PBBFAC,,, | Performed by: INTERNAL MEDICINE

## 2022-12-22 PROCEDURE — 99999 PR PBB SHADOW E&M-EST. PATIENT-LVL III: CPT | Mod: PBBFAC,,, | Performed by: INTERNAL MEDICINE

## 2022-12-22 PROCEDURE — 99204 PR OFFICE/OUTPT VISIT, NEW, LEVL IV, 45-59 MIN: ICD-10-PCS | Mod: S$PBB,,, | Performed by: INTERNAL MEDICINE

## 2022-12-29 ENCOUNTER — DOCUMENTATION ONLY (OUTPATIENT)
Dept: HEMATOLOGY/ONCOLOGY | Facility: CLINIC | Age: 57
End: 2022-12-29
Payer: OTHER GOVERNMENT

## 2022-12-29 ENCOUNTER — OFFICE VISIT (OUTPATIENT)
Dept: SURGERY | Facility: CLINIC | Age: 57
End: 2022-12-29
Payer: OTHER GOVERNMENT

## 2022-12-29 ENCOUNTER — OFFICE VISIT (OUTPATIENT)
Dept: PLASTIC SURGERY | Facility: CLINIC | Age: 57
End: 2022-12-29
Payer: OTHER GOVERNMENT

## 2022-12-29 VITALS
HEIGHT: 59 IN | TEMPERATURE: 98 F | HEART RATE: 73 BPM | SYSTOLIC BLOOD PRESSURE: 120 MMHG | BODY MASS INDEX: 27.42 KG/M2 | DIASTOLIC BLOOD PRESSURE: 69 MMHG | WEIGHT: 136 LBS | OXYGEN SATURATION: 97 %

## 2022-12-29 VITALS
HEIGHT: 59 IN | BODY MASS INDEX: 27.42 KG/M2 | SYSTOLIC BLOOD PRESSURE: 120 MMHG | OXYGEN SATURATION: 97 % | DIASTOLIC BLOOD PRESSURE: 69 MMHG | WEIGHT: 136 LBS | HEART RATE: 73 BPM

## 2022-12-29 DIAGNOSIS — Z17.1 MALIGNANT NEOPLASM OF UPPER-OUTER QUADRANT OF LEFT BREAST IN FEMALE, ESTROGEN RECEPTOR NEGATIVE: Primary | ICD-10-CM

## 2022-12-29 DIAGNOSIS — C50.412 MALIGNANT NEOPLASM OF UPPER-OUTER QUADRANT OF LEFT BREAST IN FEMALE, ESTROGEN RECEPTOR NEGATIVE: ICD-10-CM

## 2022-12-29 DIAGNOSIS — C50.412 MALIGNANT NEOPLASM OF UPPER-OUTER QUADRANT OF LEFT BREAST IN FEMALE, ESTROGEN RECEPTOR NEGATIVE: Primary | ICD-10-CM

## 2022-12-29 DIAGNOSIS — Z15.01 BRCA2 GENE MUTATION POSITIVE IN FEMALE: ICD-10-CM

## 2022-12-29 DIAGNOSIS — Z15.09 BRCA2 GENE MUTATION POSITIVE IN FEMALE: ICD-10-CM

## 2022-12-29 DIAGNOSIS — Z15.02 BRCA2 GENE MUTATION POSITIVE IN FEMALE: ICD-10-CM

## 2022-12-29 DIAGNOSIS — Z17.1 MALIGNANT NEOPLASM OF UPPER-OUTER QUADRANT OF LEFT BREAST IN FEMALE, ESTROGEN RECEPTOR NEGATIVE: ICD-10-CM

## 2022-12-29 DIAGNOSIS — Z15.09 BRCA GENE MUTATION POSITIVE: ICD-10-CM

## 2022-12-29 DIAGNOSIS — Z85.3 PERSONAL HISTORY OF BREAST CANCER: Primary | ICD-10-CM

## 2022-12-29 DIAGNOSIS — Z15.01 BRCA GENE MUTATION POSITIVE: ICD-10-CM

## 2022-12-29 PROCEDURE — 99205 PR OFFICE/OUTPT VISIT, NEW, LEVL V, 60-74 MIN: ICD-10-PCS | Mod: S$PBB,,, | Performed by: SURGERY

## 2022-12-29 PROCEDURE — 99213 OFFICE O/P EST LOW 20 MIN: CPT | Mod: PBBFAC | Performed by: SURGERY

## 2022-12-29 PROCEDURE — 99999 PR PBB SHADOW E&M-EST. PATIENT-LVL III: CPT | Mod: PBBFAC,,, | Performed by: SURGERY

## 2022-12-29 PROCEDURE — 99215 OFFICE O/P EST HI 40 MIN: CPT | Mod: S$PBB,,, | Performed by: SURGERY

## 2022-12-29 PROCEDURE — 99999 PR PBB SHADOW E&M-EST. PATIENT-LVL III: ICD-10-PCS | Mod: PBBFAC,,, | Performed by: SURGERY

## 2022-12-29 PROCEDURE — 99205 OFFICE O/P NEW HI 60 MIN: CPT | Mod: S$PBB,,, | Performed by: SURGERY

## 2022-12-29 PROCEDURE — 99213 OFFICE O/P EST LOW 20 MIN: CPT | Mod: PBBFAC,27 | Performed by: SURGERY

## 2022-12-29 PROCEDURE — 99215 PR OFFICE/OUTPT VISIT, EST, LEVL V, 40-54 MIN: ICD-10-PCS | Mod: S$PBB,,, | Performed by: SURGERY

## 2022-12-29 NOTE — PROGRESS NOTES
Breast Surgery  [facility]  Department of Surgery      REFERRING PROVIDER: Aaareferral Self  No address on file    Chief Complaint: Breast Cancer (New Patient Right Breast Carcinoma 11/17/22 .)      Subjective:      Patient ID: Trinity Morin is a 57 y.o. female who presents with IDC of the left breast.    She underwent bilateral breast MRI 5/27/22 which showed: In the left breast upper outer quadrant 4 cm from the nipple, there is a rim enhancing 6 x 7 mm mass.  In the central left breast, middle depth, 4 cm from the nipple there is a 3 mm enhancing focus. US of the left breast on 06/22/22 showed: left breast 02:00 o'clock 3 cm from the nipple show an oval, hypoechoic mass measuring 4 x 4 x 6 mm, likely corresponding to the upper outer quadrant mass seen on MRI.  An ultrasound correlate for the central focus of enhancement is not identified.  Normal lymph nodes in the left axilla.  A six-month follow-up ultrasound was recommended.     Follow-up mammogram (11/09/22) showed: In the right breast, upper outer quadrant anterior depth there is a new group of calcifications measuring 4 mm with an associated focal asymmetry.  In the left breast upper outer quadrant, anterior depth, there is an irregular mass. Bilateral ultrasound 11/09/22 showed:  right breast 11:00 o'clock, 2 cm from the nipple show an irregular hypoechoic measuring 5 x 4 x 4 mm corresponding to the mammographic finding and left breast 02:00 o'clock, 3 cm from the nipple show an irregular hypoechoic mass measuring 6 x 10 x 6 mm, previously 4 x 4 x 6 mm and corresponding to the mammographic and prior MRI finding..  No abnormal lymph nodes in either axilla. A ultrasound guided biopsy was performed on 11/17/22 with pathology revealing infiltrating ductal carcinoma of the left breast and benign breast tissue with fat necrosis, fibrosis, and associated microcalcification of the right breast.    Patient does routinely do self breast exams.  Patient  has not noted a change on breast exam.  Patient denies nipple discharge. Patient denies to previous breast biopsy. Patient denies a personal history of breast cancer.    Previous breast reduction with Dr. Diaz 21.     Findings at that time were the following:   Tumor size: 1 cm   Tumor ndgndrndanddndend:nd nd2nd Estrogen Receptor: -   Progesterone Receptor: -   Her-2 nixon: -   Lymph node status: pending   Lymphatic invasion: pending     GYN History:  Age of menarche was 14. Age of menopause was early 50's.  Patient denies hormonal therapy. Patient is . Age of first live birth was 29. Patient did not breast feed.    Past Medical History:   Diagnosis Date    Asthma     General anesthetics causing adverse effect in therapeutic use     Sleep apnea      Past Surgical History:   Procedure Laterality Date    CERVICAL BIOPSY       SECTION      EXPLORATORY LAPAROTOMY      FOOT SURGERY      REDUCTION OF BOTH BREASTS Bilateral 2021    Procedure: breast reduction;  Surgeon: Kevin Diaz MD;  Location: [facility] OR [address] [place] [address];  Service: Plastics;  Laterality: Bilateral;  LEFT BREAST: 720G  RIGHT BREAST: 728G    TUBAL LIGATION       Current Outpatient Medications on File Prior to Visit   Medication Sig Dispense Refill    ALBUTEROL INHL Inhale into the lungs 4 (four) times daily as needed.      cyanocobalamin, vitamin B-12, 50 mcg tablet Take 50 mcg by mouth once daily.      B cmplx 4/vit D3/C/folic/zinc (VITAL-D RX ORAL) Take by mouth once daily. States takes VIT D5      [DISCONTINUED] vitamin E acetate (VITAMIN E ORAL) Take by mouth.       No current facility-administered medications on file prior to visit.     Social History     Socioeconomic History    Marital status: Single   Tobacco Use    Smoking status: Never    Smokeless tobacco: Never   Substance and Sexual Activity    Alcohol use: Yes     Comment: socially     Drug use: Never     History reviewed. No pertinent family history.     Review of Systems  "  Constitutional:  Negative for appetite change, chills, fever and unexpected weight change.   HENT:  Negative for facial swelling, postnasal drip and sore throat.    Eyes:  Negative for redness and itching.   Respiratory:  Negative for chest tightness and shortness of breath.    Cardiovascular:  Negative for chest pain and palpitations.   Gastrointestinal:  Negative for blood in stool, diarrhea, nausea and vomiting.   Genitourinary:  Negative for difficulty urinating and dysuria.   Musculoskeletal:  Negative for arthralgias and joint swelling.   Skin:  Negative for rash and wound.   Neurological:  Negative for dizziness and syncope.   Hematological:  Negative for adenopathy.   Psychiatric/Behavioral:  Negative for agitation. The patient is not nervous/anxious.    Objective:   /69 (BP Location: Left arm, Patient Position: Sitting, BP Method: Medium (Automatic))   Pulse 73   Temp 97.9 °F (36.6 °C) (Oral)   Ht 4' 11" (1.499 m)   Wt 61.7 kg (136 lb)   SpO2 97%   BMI 27.47 kg/m²     Physical Exam   Vitals reviewed.  Constitutional: She is oriented to person, place, and time.   HENT:   Head: Normocephalic and atraumatic.   Nose: Nose normal.   Eyes: Pupils are equal, round, and reactive to light. Right eye exhibits no discharge. Left eye exhibits no discharge.   Pulmonary/Chest: Effort normal and breath sounds normal. No stridor. No respiratory distress. She exhibits no mass, no tenderness and no edema. Right breast exhibits no inverted nipple, no mass, no nipple discharge, no skin change and no tenderness. Left breast exhibits no inverted nipple, no mass, no nipple discharge, no skin change and no tenderness. No breast swelling or bleeding. Breasts are symmetrical.       Abdominal: Normal appearance.   Genitourinary: No breast swelling or bleeding.   Neurological: She is alert and oriented to person, place, and time.   Skin: Skin is warm and dry.     Psychiatric: Her behavior is normal. Mood, judgment and " thought content normal.     Radiology review: Images personally reviewed by me in the clinic.   Final Pathologic Diagnosis   Date/Time Value Ref Range Status   11/17/2022 02:58 PM   Final    1. Right breast mass (11 o'clock position, 2 cm from nipple), biopsy  ([id number]):      -  Benign breast tissue with fat necrosis, fibrosis and associated  microcalcifications      -  Negative for atypia or malignancy  2. Left breast mass (2 o'clock position, 3 cm from nipple), biopsy  ([id number]):      -  Invasive carcinoma of no special type (ductal), see synoptic report  SYNOPTIC REPORT  PROCEDURE:  Biopsy  SPECIMEN LATERALITY:  Left breast  TUMOR SITE:  2 o'clock position, 3 cm from nipple  HISTOLOGIC TYPE:  Invasive carcinoma of no special type (ductal)  HISTOLOGIC thGthRthAthDthEth:th th4th of 3         Tubule formation:  3         Pleomorphism:  3         Mitotic rate:  3  TUMOR SIZE:  5 mm in greatest linear dimension  DUCTAL CARCINOMA IN SITU (DCIS):  Not identified  LYMPHOVASCULAR INVASION: Not identified  MICROCALCIFICATIONS:  Not identified  BREAST BIOMARKERS (per outside facility report/biomarker were NOT present for  review):          ER:  Negative (less than 1%)          IL: Negative (less than 1%)          Her2:  Negative by FISH          Ki67:  78.5%       Comment:     Interp By Apple Srivastava M.D., Signed on 12/19/2022 at 13:26       [facility] Mammo Interpretation Of Outside Films    Result Date: 12/19/2022  INDICATION: Interpretation of outside films, breast cancer Images submitted for review: Breast MRI 05/27/2022, limited left breast ultrasound 06/22/2022, bilateral diagnostic mammogram 11/09/2022, limited bilateral breast ultrasound 11/09/2022, bilateral ultrasound-guided breast biopsies 11/17/2022 with post biopsy mammogram Comparisons: Screening mammograms 11/08/2019 and 11/03/2018 Images submitted from [facility]; RUDDY Lai Breast MRI 05/27/2022: There are scattered fibroglandular densities.   There is minimal background parenchymal enhancement.  No enlarged axillary or internal mammary lymph nodes.  There are postsurgical changes from bilateral breast reduction seen in both breasts.  No abnormal enhancement or suspicious mass in the right breast.  In the left breast upper outer quadrant 4 cm from the nipple, there is a rim enhancing 6 x 7 mm mass.  In the central left breast, middle depth, 4 cm from the nipple there is a 3 mm enhancing focus. Left breast ultrasound 06/22/2022: Please note due to the user dependent nature of ultrasound, evaluation is limited. Images labeled left breast 02:00 o'clock 3 cm from the nipple show an oval, hypoechoic mass measuring 4 x 4 x 6 mm, likely corresponding to the upper outer quadrant mass seen on MRI.  An ultrasound correlate for the central focus of enhancement is not identified.  Normal lymph nodes in the left axilla.  A six-month follow-up ultrasound was recommended. Bilateral diagnostic mammogram and bilateral breast ultrasound 11/09/2022: There are scattered fibroglandular densities.  Postsurgical changes from bilateral reduction mammoplasty.  In the right breast, upper outer quadrant anterior depth there is a new group of calcifications measuring 4 mm with an associated focal asymmetry.  In the left breast upper outer quadrant, anterior depth, there is an irregular mass. US images labeled right breast 11:00 o'clock, 2 cm from the nipple show an irregular hypoechoic measuring 5 x 4 x 4 mm corresponding to the mammographic finding. US images labeled left breast 02:00 o'clock, 3 cm from the nipple show an irregular hypoechoic mass measuring 6 x 10 x 6 mm, previously 4 x 4 x 6 mm and corresponding to the mammographic and prior MRI finding..  No abnormal lymph nodes in either axilla. Ultrasound-guided bilateral breast biopsy 11/17/2022: Images show a percutaneous needle traversing the right breast mass at 11:00 o'clock and the left breast mass at 02:00 o'clock.  Post  biopsy mammogram shows a wing shaped marker in the right breast upper outer quadrant, and a ribbon shaped marker in the left breast upper outer quadrant mass. Pathology showed fat necrosis of the right breast biopsy and invasive ductal carcinoma for the left breast biopsy. IMPRESSION: Left breast mass at 02:00 o'clock consistent with invasive ductal carcinoma. Left breast 3 mm enhancing focus in the central region, middle depth seen on MRI 05/27/2022 is indeterminate. No mammographic or US correlate identified on images submitted. No evidence of malignancy in the right breast. BI-RADS: 4 If patient desires breast conservation therapy, MRI guided biopsy for left breast central focus is recommended. Clinical management of known left breast cancer. Patient is established with the breast surgery clinic.     Assessment:       1. Malignant neoplasm of upper-outer quadrant of left breast in female, estrogen receptor negative    2. BRCA2 gene mutation positive in female        Plan:     Options for management were discussed with the patient and her family. We reviewed the existing data noting the equivalency of breast conserving surgery with radiation therapy and mastectomy. We also reviewed the guidelines of the National Comprehensive Cancer Network for stage I breast carcinoma. We discussed the need for lumpectomy margins to be negative for carcinoma, the necessity for postoperative radiation therapy after breast conservation in most cases, the possibility of a failed or false negative sentinel lymph node biopsy and the potential need for complete lymphadenectomy for a failed or positive sentinel lymph node biopsy were fully discussed. In the setting of mastectomy, delayed or immediate reconstruction options are available and were discussed.     In the setting of lumpectomy, radiation therapy would be recommended majority of the time.  The duration and treatment side effects were discussed with the patient.  This will  coordinated with the radiation oncologist pending final pathology.    We also discussed the role of systemic therapy in the treatment of early stage breast cancer.  We discussed that this is based on tumor biology and adriano status and will be determined based on final pathology.  We discussed that if the cancer is hormone positive, endocrine therapy would be recommended in most cases and its use can reduce the risk of recurrence as well as improve survival. Side effects of treatment were briefly discussed. We also discussed the potential role for chemotherapy based on a number of factors such as tumor phenotype (ER+ vs. triple negative vs. Xai4lbq+) and this would be determined in coordination with the medical oncologist.    Patient met with Dr. Mendoza who felt given the size of her tumor, recommended surgery first followed by adjuvant chemotherapy with Taxotere and Cytoxan pending negative nodes.     The patient, in consultation with her family, has elected to proceed with bilateral mastectomy and sentinel lymph node biopsy. Patient is interested in JUNAID flap reconstruction. She will meet with Dr. Curry in Plastic Surgery to discuss at visit today after our visit. The operative risks of bleeding, infection, recurrence, scarring, and anesthetic complications and the possibility of requiring further surgery were all noted and informed consent obtained.  Ok for [facility]   Surgery scheduled. Follow-up in clinic roughly 14 days after surgery.     Patient was educated on breast cancer, receptors, wire localization lumpectomy, mastectomy, sentinel lymph node mapping and biopsy, axillary lymph node dissection, reconstruction, breast prosthesis with post-mastectomy bra and radiation therapy. Patient was given patient information binder including [facility] breast cancer treatment brochure.  All her questions were answered.    Total time spent with the patient: 65 minutes.  45 minutes of face to face consultation and 20 minutes of chart  review and coordination of care.

## 2022-12-29 NOTE — PROGRESS NOTES
Plastic Surgery History & Physical    SUBJECTIVE:   Chief complaint:  Discuss breast reconstruction    History of Present Illness:  57 y.o. female who is BRCA2 positive.  This was diagnosed when her sister had breast cancer.  She is been in high-risk screening for several years.  She would undergo a bilateral breast reduction with Dr. Duran's 18 months ago.  More recently however on screening imaging she was found to have a suspicious mass.  This was diagnosed as a small triple negative invasive breast cancer.  She saw Dr. Diaz briefly about this who referred her to me to discuss autologous reconstruction     She recently has seen Dr. Mendoza and saw Dr. Jimnéez earlier today.  It is a low likelihood that she would need adjuvant radiation, she will need adjuvant chemotherapy but not neoadjuvant chemotherapy.      Past medical history includes asthma obstructive sleep apnea vitamin-D deficiency.    Past surgical history includes  diagnostic bronchoscopy and multiple foot surgeries     She denies use of nicotine products   She works in [employer] at [place]    Past Medical History:   Diagnosis Date    Asthma     General anesthetics causing adverse effect in therapeutic use     Sleep apnea        Past Surgical History:   Procedure Laterality Date    CERVICAL BIOPSY       SECTION      EXPLORATORY LAPAROTOMY      FOOT SURGERY      REDUCTION OF BOTH BREASTS Bilateral 2021    Procedure: breast reduction;  Surgeon: Kevin Diaz MD;  Location: [facility] OR [address] [place] [address];  Service: Plastics;  Laterality: Bilateral;  LEFT BREAST: 720G  RIGHT BREAST: 728G    TUBAL LIGATION         No family history on file.    Social History     Socioeconomic History    Marital status: Single   Tobacco Use    Smoking status: Never    Smokeless tobacco: Never   Substance and Sexual Activity    Alcohol use: Yes     Comment: socially     Drug use: Never       Current Outpatient Medications   Medication Sig Dispense Refill     "ALBUTEROL INHL Inhale into the lungs 4 (four) times daily as needed.      B cmplx 4/vit D3/C/folic/zinc (VITAL-D RX ORAL) Take by mouth once daily. States takes VIT D5      cyanocobalamin, vitamin B-12, 50 mcg tablet Take 50 mcg by mouth once daily.       No current facility-administered medications for this visit.       Review of patient's allergies indicates:   Allergen Reactions    Nitrofurantoin monohyd/m-cryst Hives    Sulfamethoxazole-trimethoprim Anaphylaxis, Itching, Rash, Swelling and Hives    Dextromethorphan      [facility]    Macrodantin [nitrofurantoin macrocrystalline]     Rifampin      [facility] record    Sulfa (sulfonamide antibiotics)          Review of Systems:  Review of Systems   Constitutional:  Negative for unexpected weight change.   Gastrointestinal:  Negative for abdominal pain.   Skin:  Negative for rash and wound.         OBJECTIVE:     /69   Pulse 73   Ht 4' 11" (1.499 m)   Wt 61.7 kg (136 lb)   SpO2 97%   BMI 27.47 kg/m²       Physical Exam:  Gen: NAD, appears stated age  Neuro: normal without focal findings, mental status and speech normal  HEENT: NCAT, neck supple, PEERL  CV: RRR  Pulm: Breathing non-labored, chest wall movement equal bilaterally   Breast:  Well-healed wise pattern incisions with no wound breakdown.  No palpable mass..  Full breasts  Abdomen: soft, nontender, no guarding, adequate tissue for moderate sized flaps.  We will be smaller than her current breast tissue.  Periumbilical diastasis versus small hernia  Gu: genitalia not examined  Extremity:normal strength, no cyanosis or edema  Skin: Skin color, texture, turgor normal. No rashes or lesions  Psych: oriented to time, place and person, mood and affect are within normal limits          ASSESSMENT/PLAN:     Malignant neoplasm of upper-outer quadrant of left breast in female, estrogen receptor negative  Briefly discussed implant based reconstruction, patient is not really interested.     Next we discussed " autologous reconstruction with the most common donor sites (abdomen, thigh, back). Explained that this procedure will reconstruction the breast with the patients own living tissue, without the need for an implant, but in exchange for a longer initial procedure and on average three day hospital stay afterwards.  Discussed flap monitoring, risks of take back/flap failure/ expected hospital course, use of drains, and recovery.  Risks including flap loss, fat necrosis, infection, wound breakdown, seroma, hematoma, scarring, need for reoperation, blood clots were all covered.  Typical revision procedures including lifting the breast, tailoring the skin, liposuction and fat grafting for contour were also discussed.     Patient prefers autologous reconstruction.  She is a good candidate for immediate JUNAID flap reconstruction.  I explained that her flaps would be smaller than her current breasts were tailor the skin some at the time of her mastectomy but ultimately we will reduce the skin envelope better second-stage    We will coordinate with Dr. Jiménez   Will get CTA and sent for medical photography.    She does have a small diastasis or hernia which we will evaluate better on CTA.  Always    Garcia Curry, DO  Plastic and Reconstructive Surgery    I spent a total of 45 minutes on the day of the visit.  This includes face to face time and non-face to face time preparing to see the patient (eg, review of tests), obtaining and/or reviewing separately obtained history, documenting clinical information in the electronic or other health record, independently interpreting results and communicating results to the patient/family/caregiver, or care coordinator.

## 2022-12-29 NOTE — ASSESSMENT & PLAN NOTE
Briefly discussed implant based reconstruction, patient is not really interested.     Next we discussed autologous reconstruction with the most common donor sites (abdomen, thigh, back). Explained that this procedure will reconstruction the breast with the patients own living tissue, without the need for an implant, but in exchange for a longer initial procedure and on average three day hospital stay afterwards.  Discussed flap monitoring, risks of take back/flap failure/ expected hospital course, use of drains, and recovery.  Risks including flap loss, fat necrosis, infection, wound breakdown, seroma, hematoma, scarring, need for reoperation, blood clots were all covered.  Typical revision procedures including lifting the breast, tailoring the skin, liposuction and fat grafting for contour were also discussed.     Patient prefers autologous reconstruction.  She is a good candidate for immediate JUNAID flap reconstruction.  I explained that her flaps would be smaller than her current breasts were tailor the skin some at the time of her mastectomy but ultimately we will reduce the skin envelope better second-stage    We will coordinate with Dr. Jiménez   Will get CTA and sent for medical photography.    She does have a small diastasis or hernia which we will evaluate better on CTA.  Always

## 2022-12-29 NOTE — PROGRESS NOTES
Nurse Navigator Note:     Met with patient during her consult with Dr. Jiménez.  Patient and I reviewed the information she discussed with Dr. Jiménez, including treatment options, diagnosis, and future plans for workup. Patient and I went through the new patient binder, explained some of the information and why it is provided.     Also offered patient consults with our other specialty clinics: Dr. Sanders for Integrative Therapies, Survivorship and/or Women's Gynecologic needs, our breast physical therapy department for pre-op and post-operative assessments, Oncologic Psychology for psychological support, and Oncologic Nutrition for nutritional counseling. Explained to patient that all of these support services are completely optional. Discussed that physical therapy may call patient to offer pre-op appt, and what that appt would entail.     Patient was given a copy of her appointments, Dr. Jiménez's card, and my card. Encouraged her to call me if she has any questions or concerns or would like to schedule any additional appointments. Verbalized understanding of all information.    Oncology Navigation   Intake  Date of Diagnosis: 11/17/22  Cancer Type: Breast  Internal / External Referral: External  Date of Referral: 12/02/22  Initial Nurse Navigator Contact: 12/13/22  Referral to Initial Contact Timeline (days): 11  Date Worked: 12/29/22  First Appointment Available: 12/27/22  Appointment Date: 12/27/22  First Available Date vs. Scheduled Date (days): 0  Multiple appointments: Yes     Treatment  Current Status: Staging work-up    Surgical Oncologist: Dr.Amy Jiménez  Plastic Surgeon: Patricio  Type of Surgery: Bilateral nipple sparing mastectomy with LSLNB and JUNAID  Consult Date: 12/29/22    Medical Oncologist: Reji  Consult Date: 12/29/22       Procedures: Biopsy; MRI; Genetic test  Biopsy Schedule Date: 11/17/22  Genetic Testing Date Sent:  (pt reports completed 9yrs ago, trying to get results)  MRI Schedule Date:  05/27/22    General Referrals: Integrative Medicine                                             hide              Acuity      Follow Up  No follow-ups on file.

## 2023-01-03 ENCOUNTER — HOSPITAL ENCOUNTER (OUTPATIENT)
Dept: RADIOLOGY | Facility: HOSPITAL | Age: 58
Discharge: HOME | End: 2023-01-03
Attending: SURGERY
Payer: OTHER GOVERNMENT

## 2023-01-03 DIAGNOSIS — Z85.3 PERSONAL HISTORY OF BREAST CANCER: ICD-10-CM

## 2023-01-03 PROCEDURE — 74174 CTA ABD&PLVS W/CONTRAST: CPT | Mod: TC

## 2023-01-03 PROCEDURE — 74174 CTA ABD&PLVS W/CONTRAST: CPT | Mod: 26,,, | Performed by: RADIOLOGY

## 2023-01-03 PROCEDURE — 25500020 PHARM REV CODE 255: Performed by: SURGERY

## 2023-01-03 PROCEDURE — 74174: ICD-10-PCS | Mod: 26,,, | Performed by: RADIOLOGY

## 2023-01-03 RX ADMIN — IOHEXOL 75 ML: 350 INJECTION, SOLUTION INTRAVENOUS at 07:01

## 2023-01-12 ENCOUNTER — TELEPHONE (OUTPATIENT)
Dept: SURGERY | Facility: CLINIC | Age: 58
End: 2023-01-12
Payer: OTHER GOVERNMENT

## 2023-01-12 ENCOUNTER — TELEPHONE (OUTPATIENT)
Dept: PLASTIC SURGERY | Facility: CLINIC | Age: 58
End: 2023-01-12
Payer: OTHER GOVERNMENT

## 2023-01-12 NOTE — TELEPHONE ENCOUNTER
Spoke with pt about missed call and not from our office - she stated that it was a missed call from Kim, Her therapist.

## 2023-01-12 NOTE — TELEPHONE ENCOUNTER
Left VM with my direct contact information, patient advised to give a return call to confirm surgery date with Wesley Jiménez and Patricio.   Smoking hx

## 2023-01-13 ENCOUNTER — PATIENT MESSAGE (OUTPATIENT)
Dept: PLASTIC SURGERY | Facility: CLINIC | Age: 58
End: 2023-01-13
Payer: OTHER GOVERNMENT

## 2023-01-13 ENCOUNTER — PATIENT MESSAGE (OUTPATIENT)
Dept: SURGERY | Facility: OTHER | Age: 58
End: 2023-01-13
Payer: OTHER GOVERNMENT

## 2023-01-13 ENCOUNTER — PATIENT MESSAGE (OUTPATIENT)
Dept: OTOLARYNGOLOGY | Facility: CLINIC | Age: 58
End: 2023-01-13
Payer: OTHER GOVERNMENT

## 2023-01-18 RX ORDER — SODIUM CHLORIDE 9 MG/ML
INJECTION, SOLUTION INTRAVENOUS CONTINUOUS
Status: CANCELLED | OUTPATIENT
Start: 2023-01-18

## 2023-01-19 ENCOUNTER — TELEPHONE (OUTPATIENT)
Dept: PLASTIC SURGERY | Facility: CLINIC | Age: 58
End: 2023-01-19
Payer: OTHER GOVERNMENT

## 2023-01-19 NOTE — TELEPHONE ENCOUNTER
Called pt back in regards to canceling EKG on 1/20/23- After speaking to pt she decided to keep EKG appt at [facility] 1/[facility] at 0930- Gave her the direct # to preadmit at [facility] to call if needs to reschedule- Pt verbalized understanding - No additional concerns at this time.

## 2023-01-20 ENCOUNTER — ANESTHESIA EVENT (OUTPATIENT)
Dept: SURGERY | Facility: OTHER | Age: 58
DRG: 581 | End: 2023-01-20
Payer: OTHER GOVERNMENT

## 2023-01-20 ENCOUNTER — HOSPITAL ENCOUNTER (OUTPATIENT)
Dept: PREADMISSION TESTING | Facility: OTHER | Age: 58
Discharge: HOME | End: 2023-01-20
Attending: SURGERY
Payer: OTHER GOVERNMENT

## 2023-01-20 VITALS
OXYGEN SATURATION: 99 % | DIASTOLIC BLOOD PRESSURE: 72 MMHG | RESPIRATION RATE: 18 BRPM | HEIGHT: 59 IN | BODY MASS INDEX: 27.42 KG/M2 | HEART RATE: 66 BPM | TEMPERATURE: 98 F | SYSTOLIC BLOOD PRESSURE: 139 MMHG | WEIGHT: 136 LBS

## 2023-01-20 DIAGNOSIS — Z17.1 MALIGNANT NEOPLASM OF UPPER-OUTER QUADRANT OF LEFT BREAST IN FEMALE, ESTROGEN RECEPTOR NEGATIVE: ICD-10-CM

## 2023-01-20 DIAGNOSIS — C50.412 MALIGNANT NEOPLASM OF UPPER-OUTER QUADRANT OF LEFT BREAST IN FEMALE, ESTROGEN RECEPTOR NEGATIVE: ICD-10-CM

## 2023-01-20 LAB
ALBUMIN SERPL BCP-MCNC: 3.9 G/DL (ref 3.5–5.2)
ALP SERPL-CCNC: 73 U/L (ref 55–135)
ALT SERPL W/O P-5'-P-CCNC: 18 U/L (ref 10–44)
ANION GAP SERPL CALC-SCNC: 5 MMOL/L (ref 8–16)
AST SERPL-CCNC: 20 U/L (ref 10–40)
BASOPHILS # BLD AUTO: 0.03 K/UL (ref 0–0.2)
BASOPHILS NFR BLD: 0.6 % (ref 0–1.9)
BILIRUB SERPL-MCNC: 0.7 MG/DL (ref 0.1–1)
BUN SERPL-MCNC: 15 MG/DL (ref 6–20)
CALCIUM SERPL-MCNC: 9.9 MG/DL (ref 8.7–10.5)
CHLORIDE SERPL-SCNC: 106 MMOL/L (ref 95–110)
CO2 SERPL-SCNC: 30 MMOL/L (ref 23–29)
CREAT SERPL-MCNC: 0.8 MG/DL (ref 0.5–1.4)
DIFFERENTIAL METHOD: ABNORMAL
EOSINOPHIL # BLD AUTO: 0.2 K/UL (ref 0–0.5)
EOSINOPHIL NFR BLD: 4.2 % (ref 0–8)
ERYTHROCYTE [DISTWIDTH] IN BLOOD BY AUTOMATED COUNT: 13.5 % (ref 11.5–14.5)
EST. GFR  (NO RACE VARIABLE): >60 ML/MIN/1.73 M^2
GLUCOSE SERPL-MCNC: 97 MG/DL (ref 70–110)
HCT VFR BLD AUTO: 38.2 % (ref 37–48.5)
HGB BLD-MCNC: 13 G/DL (ref 12–16)
IMM GRANULOCYTES # BLD AUTO: 0.01 K/UL (ref 0–0.04)
IMM GRANULOCYTES NFR BLD AUTO: 0.2 % (ref 0–0.5)
LYMPHOCYTES # BLD AUTO: 2.4 K/UL (ref 1–4.8)
LYMPHOCYTES NFR BLD: 47.6 % (ref 18–48)
MCH RBC QN AUTO: 28.6 PG (ref 27–31)
MCHC RBC AUTO-ENTMCNC: 34 G/DL (ref 32–36)
MCV RBC AUTO: 84 FL (ref 82–98)
MONOCYTES # BLD AUTO: 0.6 K/UL (ref 0.3–1)
MONOCYTES NFR BLD: 11.3 % (ref 4–15)
NEUTROPHILS # BLD AUTO: 1.8 K/UL (ref 1.8–7.7)
NEUTROPHILS NFR BLD: 36.1 % (ref 38–73)
NRBC BLD-RTO: 0 /100 WBC
PLATELET # BLD AUTO: 268 K/UL (ref 150–450)
PMV BLD AUTO: 11 FL (ref 9.2–12.9)
POTASSIUM SERPL-SCNC: 4.2 MMOL/L (ref 3.5–5.1)
PROT SERPL-MCNC: 7.9 G/DL (ref 6–8.4)
RBC # BLD AUTO: 4.54 M/UL (ref 4–5.4)
SODIUM SERPL-SCNC: 141 MMOL/L (ref 136–145)
WBC # BLD AUTO: 5.04 K/UL (ref 3.9–12.7)

## 2023-01-20 PROCEDURE — 93010 ELECTROCARDIOGRAM REPORT: CPT | Mod: ,,, | Performed by: INTERNAL MEDICINE

## 2023-01-20 PROCEDURE — 85025 COMPLETE CBC W/AUTO DIFF WBC: CPT | Performed by: SURGERY

## 2023-01-20 PROCEDURE — 93005 ELECTROCARDIOGRAM TRACING: CPT

## 2023-01-20 PROCEDURE — 80053 COMPREHEN METABOLIC PANEL: CPT | Performed by: SURGERY

## 2023-01-20 PROCEDURE — 36415 COLL VENOUS BLD VENIPUNCTURE: CPT | Performed by: SURGERY

## 2023-01-20 PROCEDURE — 93010 EKG 12-LEAD: ICD-10-PCS | Mod: ,,, | Performed by: INTERNAL MEDICINE

## 2023-01-20 RX ORDER — SODIUM CHLORIDE, SODIUM LACTATE, POTASSIUM CHLORIDE, CALCIUM CHLORIDE 600; 310; 30; 20 MG/100ML; MG/100ML; MG/100ML; MG/100ML
INJECTION, SOLUTION INTRAVENOUS CONTINUOUS
Status: CANCELLED | OUTPATIENT
Start: 2023-01-20

## 2023-01-20 RX ORDER — CHOLECALCIFEROL (VITAMIN D3) 25 MCG
1000 TABLET ORAL DAILY
COMMUNITY

## 2023-01-20 RX ORDER — ACETAMINOPHEN 500 MG
1000 TABLET ORAL
Status: CANCELLED | OUTPATIENT
Start: 2023-01-20 | End: 2023-01-20

## 2023-01-20 RX ORDER — LIDOCAINE HYDROCHLORIDE 10 MG/ML
0.5 INJECTION, SOLUTION EPIDURAL; INFILTRATION; INTRACAUDAL; PERINEURAL ONCE
Status: CANCELLED | OUTPATIENT
Start: 2023-01-20 | End: 2023-01-20

## 2023-01-20 NOTE — DISCHARGE INSTRUCTIONS
Information to Prepare you for your Surgery    PRE-ADMIT TESTING -  [phone]    [address] [facility]          Your surgery has been scheduled at [facility]. We are pleased to have the opportunity to serve you. For Further Information please call [phone].    On the day of surgery please report to the Information Desk on the 1st floor.    CONTACT YOUR PHYSICIAN'S OFFICE THE DAY PRIOR TO YOUR SURGERY TO OBTAIN YOUR ARRIVAL TIME.     The evening before surgery do not eat anything after 9 p.m. ( this includes hard candy, chewing gum and mints).  You may only have GATORADE, POWERADE AND WATER  from 9 p.m. until you leave your home.   DO NOT DRINK ANY LIQUIDS ON THE WAY TO THE HOSPITAL.      Why does your anesthesiologist allow you to drink Gatorade/Powerade before surgery?  Gatorade/Powerade helps to increase your comfort before surgery and to decrease your nausea after surgery. The carbohydrates in Gatorade/Powerade help reduce your body's stress response to surgery.  If you are a diabetic-drink only water prior to surgery.      Current Visitor policy(12/27/2021) - Patients may have 2 visitors pre and post procedure. Only 2 visitors will be allowed in the Surgical building with the patient. No one under the age of 12 will be allowed into the facility.    SPECIAL MEDICATION INSTRUCTIONS: TAKE medications checked off by the Anesthesiologist on your Medication List.    Angiogram Patients: Take medications as instructed by your physician, including aspirin.     Surgery Patients:    If you take ASPIRIN - Your PHYSICIAN/SURGEON will need to inform you IF/OR when you need to stop taking aspirin prior to your surgery.     The week prior to surgery do not ot take any medications containing IBUPROFEN or NSAIDS ( Advil, Motrin, Goodys, BC, Aleve, Naproxen etc) If you are not sure if you should take a medicine please call your surgeon's office.  Ok to take  Tylenol    Do Not Wear any make-up (especially eye make-up) to surgery. Please remove any false eyelashes or eyelash extensions. If you arrive the day of surgery with makeup/eyelashes on you will be required to remove prior to surgery. (There is a risk of corneal abrasions if eye makeup/eyelash extensions are not removed)      Leave all valuables at home.   Do Not wear any jewelry or watches, including any metal in body piercings. Jewelry must be removed prior to coming to the hospital.  There is a possibility that rings that are unable to be removed may be cut off if they are on the surgical extremity.    Please remove all hair extensions, wigs, clips and any other metal accessories/ ornaments from your hair.  These items may pose a flammable/fire risk in Surgery and must be removed.    Do not shave your surgical area at least 5 days prior to your surgery. The surgical prep will be performed at the hospital according to Infection Control regulations.    Contact Lens must be removed before surgery. Either do not wear the contact lens or bring a case and solution for storage.  Please bring a container for eyeglasses or dentures as required.  Bring any paperwork your physician has provided, such as consent forms,  history and physicals, doctor's orders, etc.   Bring comfortable clothes that are loose fitting to wear upon discharge. Take into consideration the type of surgery being performed.  Maintain your diet as advised per your physician the day prior to surgery.      Adequate rest the night before surgery is advised.   Park in the Parking lot behind the hospital or in the [facility] across the street from the parking lot. Parking is complimentary.  If you will be discharged the same day as your procedure, please arrange for a responsible adult to drive you home or to accompany you if traveling by taxi.   YOU WILL NOT BE PERMITTED TO DRIVE OR TO LEAVE THE HOSPITAL ALONE AFTER SURGERY.   If you are  being discharged the same day, it is strongly recommended that you arrange for someone to remain with you for the first 24 hrs following your surgery.    The Surgeon will speak to your family/visitor after your surgery regarding the outcome of your surgery and post op care.  The Surgeon may speak to you after your surgery, but there is a possibility you may not remember the details.  Please check with your family members regarding the conversation with the Surgeon.    We strongly recommend whoever is bringing you home be present for discharge instructions.  This will ensure a thorough understanding for your post op home care.    ALL CHILDREN MUST ALWAYS BE ACCOMPANIED BY AN ADULT.    Visitors-Refer to current Visitor policy handouts.    Thank you for your cooperation.  The Staff of [facility].            Bathing Instructions with Hibiclens    Shower the evening before and morning of your procedure with Chlorhexidine (Hibiclens)  do not use Chlorhexidine on your face or genitals. Do not get in your eyes.  Wash your face with water and your regular face wash/soap  Use your regular shampoo  Apply Chlorhexidine (Hibiclens) directly on your skin or on a wet washcloth and wash gently. When showering: Move away from the shower stream when applying Chlorhexidine (Hibiclens) to avoid rinsing off too soon.  Rinse thoroughly with warm water  Do not dilute Chlorhexidine (Hibiclens)   Dry off as usual, do not use any deodorant, powder, body lotions, perfume, after shave or cologne.

## 2023-01-20 NOTE — ANESTHESIA PREPROCEDURE EVALUATION
01/20/2023  Trinity Morin is a 57 y.o., female.      Pre-op Assessment    I have reviewed the Patient Summary Reports.     I have reviewed the Nursing Notes. I have reviewed the NPO Status.   I have reviewed the Medications.     Review of Systems  Anesthesia Hx:  Denies Family Hx of Anesthesia complications.  Personal Hx of Anesthesia complications Slow To Awaken/Delayed Emergence   Social:  Non-Smoker, Social Alcohol Use    Hematology/Oncology:  Hematology Normal      Current/Recent Cancer. Breast left   EENT/Dental:EENT/Dental Normal   Cardiovascular:  Cardiovascular Normal     Pulmonary:   Asthma (rare inhaler) Sleep Apnea (mouthpiece)    Renal/:  Renal/ Normal     Hepatic/GI:  Hepatic/GI Normal    Musculoskeletal:  Musculoskeletal Normal    Neurological:  Neurology Normal    Endocrine:  Endocrine Normal    Dermatological:  Skin Normal    Psych:  Psychiatric Normal           Physical Exam  General: Cooperative, Alert and Oriented    Airway:  Mallampati: II   Mouth Opening: Normal  TM Distance: Normal  Neck ROM: Normal ROM    Dental:  Intact        Anesthesia Plan  Type of Anesthesia, risks & benefits discussed:    Anesthesia Type: Gen ETT  Intra-op Monitoring Plan: Standard ASA Monitors  Post Op Pain Control Plan: multimodal analgesia and IV/PO Opioids PRN  Induction:  IV  Airway Plan: Video  Informed Consent: Informed consent signed with the Patient and all parties understand the risks and agree with anesthesia plan.  All questions answered.   ASA Score: 2  Anesthesia Plan Notes: EKG/lab ordered by surgeon    Ready For Surgery From Anesthesia Perspective.     .

## 2023-01-20 NOTE — PRE ADMISSION SCREENING
Pt states that in the past, when she has had anesthesia, she wakes up with a dry mouth and requests to have mouth swab sponge available in pacu when she wakes up.

## 2023-01-31 ENCOUNTER — OFFICE VISIT (OUTPATIENT)
Dept: PLASTIC SURGERY | Facility: CLINIC | Age: 58
End: 2023-01-31
Payer: OTHER GOVERNMENT

## 2023-01-31 VITALS
HEART RATE: 76 BPM | WEIGHT: 136 LBS | DIASTOLIC BLOOD PRESSURE: 59 MMHG | BODY MASS INDEX: 27.42 KG/M2 | SYSTOLIC BLOOD PRESSURE: 127 MMHG | HEIGHT: 59 IN

## 2023-01-31 DIAGNOSIS — C50.412 MALIGNANT NEOPLASM OF UPPER-OUTER QUADRANT OF LEFT BREAST IN FEMALE, ESTROGEN RECEPTOR NEGATIVE: ICD-10-CM

## 2023-01-31 DIAGNOSIS — Z17.1 MALIGNANT NEOPLASM OF UPPER-OUTER QUADRANT OF LEFT BREAST IN FEMALE, ESTROGEN RECEPTOR NEGATIVE: ICD-10-CM

## 2023-01-31 DIAGNOSIS — Z85.3 PERSONAL HISTORY OF BREAST CANCER: Primary | ICD-10-CM

## 2023-01-31 PROCEDURE — 99213 PR OFFICE/OUTPT VISIT, EST, LEVL III, 20-29 MIN: ICD-10-PCS | Mod: S$GLB,,, | Performed by: SURGERY

## 2023-01-31 PROCEDURE — 99213 OFFICE O/P EST LOW 20 MIN: CPT | Mod: S$GLB,,, | Performed by: SURGERY

## 2023-01-31 RX ORDER — ACETAMINOPHEN 500 MG
1000 TABLET ORAL EVERY 8 HOURS
Qty: 42 TABLET | Refills: 0 | Status: ON HOLD
Start: 2023-01-31 | End: 2023-02-11

## 2023-01-31 RX ORDER — METHOCARBAMOL 500 MG/1
500-1000 TABLET, FILM COATED ORAL 3 TIMES DAILY PRN
Qty: 20 TABLET | Refills: 0 | Status: SHIPPED | OUTPATIENT
Start: 2023-01-31 | End: 2023-02-05

## 2023-01-31 RX ORDER — CEPHALEXIN 500 MG/1
500 CAPSULE ORAL EVERY 12 HOURS
Qty: 8 CAPSULE | Refills: 0 | OUTPATIENT
Start: 2023-01-31 | End: 2023-01-31

## 2023-01-31 RX ORDER — OXYCODONE HYDROCHLORIDE 5 MG/1
5 TABLET ORAL EVERY 4 HOURS PRN
Qty: 10 TABLET | Refills: 0 | Status: SHIPPED | OUTPATIENT
Start: 2023-01-31 | End: 2023-01-31 | Stop reason: ALTCHOICE

## 2023-01-31 RX ORDER — CEPHALEXIN 500 MG/1
500 CAPSULE ORAL EVERY 12 HOURS
Qty: 14 CAPSULE | Refills: 0 | Status: ON HOLD | OUTPATIENT
Start: 2023-01-31 | End: 2023-02-11

## 2023-01-31 RX ORDER — CEPHALEXIN 500 MG/1
500 CAPSULE ORAL EVERY 12 HOURS
Qty: 8 CAPSULE | Refills: 0 | OUTPATIENT
Start: 2023-01-31 | End: 2023-01-31 | Stop reason: ALTCHOICE

## 2023-01-31 RX ORDER — GABAPENTIN 100 MG/1
300 CAPSULE ORAL 3 TIMES DAILY
Qty: 63 CAPSULE | Refills: 0 | Status: SHIPPED | OUTPATIENT
Start: 2023-01-31 | End: 2023-02-23

## 2023-01-31 RX ORDER — GABAPENTIN 300 MG/1
300 CAPSULE ORAL 3 TIMES DAILY
Qty: 90 CAPSULE | Refills: 11 | OUTPATIENT
Start: 2023-01-31 | End: 2023-01-31 | Stop reason: ALTCHOICE

## 2023-01-31 RX ORDER — METHOCARBAMOL 500 MG/1
500 TABLET, FILM COATED ORAL 4 TIMES DAILY
Qty: 20 TABLET | Refills: 0 | OUTPATIENT
Start: 2023-01-31 | End: 2023-01-31 | Stop reason: ALTCHOICE

## 2023-01-31 RX ORDER — IBUPROFEN 800 MG/1
800 TABLET ORAL EVERY 6 HOURS
Qty: 28 TABLET | Refills: 0 | Status: ON HOLD
Start: 2023-01-31 | End: 2023-02-11

## 2023-01-31 RX ORDER — OXYCODONE HYDROCHLORIDE 5 MG/1
5 CAPSULE ORAL EVERY 4 HOURS PRN
Qty: 10 CAPSULE | Refills: 0 | Status: SHIPPED | OUTPATIENT
Start: 2023-01-31 | End: 2023-07-03

## 2023-02-01 ENCOUNTER — PATIENT MESSAGE (OUTPATIENT)
Dept: HEMATOLOGY/ONCOLOGY | Facility: CLINIC | Age: 58
End: 2023-02-01
Payer: OTHER GOVERNMENT

## 2023-02-01 NOTE — H&P (VIEW-ONLY)
Plastic Surgery History & Physical    SUBJECTIVE:   Chief complaint: pre-po visit    History of Present Illness:  57 y.o. female is here for per per-opaerative visit to discuss final surgical plan.  Has a left breast cancer and is BRCA2 positive.  Of note also had a BBR with DR Diaz.  Since her last visit we have decidede upon BL JUNAID flap recon.  She has had a CTA and has favorable  anatomy    Past Medical History:   Diagnosis Date    Asthma     General anesthetics causing adverse effect in therapeutic use     Sleep apnea     wears mouth device       Past Surgical History:   Procedure Laterality Date    CERVICAL BIOPSY       SECTION      EXPLORATORY LAPAROTOMY      FOOT SURGERY      REDUCTION OF BOTH BREASTS Bilateral 2021    Procedure: breast reduction;  Surgeon: Kevin Diaz MD;  Location: [facility] OR [address] [place] [address];  Service: Plastics;  Laterality: Bilateral;  LEFT BREAST: 720G  RIGHT BREAST: 728G    TUBAL LIGATION         No family history on file.    Social History     Socioeconomic History    Marital status: Single   Tobacco Use    Smoking status: Never    Smokeless tobacco: Never   Substance and Sexual Activity    Alcohol use: Yes     Comment: socially     Drug use: Never    Sexual activity: Yes     Partners: Male       Current Outpatient Medications   Medication Sig Dispense Refill    acetaminophen (TYLENOL) 500 MG tablet Take 2 tablets (1,000 mg total) by mouth every 8 (eight) hours. for 7 days 42 tablet 0    ALBUTEROL INHL Inhale into the lungs 4 (four) times daily as needed.      cephALEXin (KEFLEX) 500 MG capsule Take 1 capsule (500 mg total) by mouth every 12 (twelve) hours. for 7 days 14 capsule 0    cyanocobalamin, vitamin B-12, 50 mcg tablet Take 50 mcg by mouth once daily.      gabapentin (NEURONTIN) 100 MG capsule Take 3 capsules (300 mg total) by mouth 3 (three) times daily. for 7 days 63 capsule 0    ibuprofen (ADVIL,MOTRIN) 800 MG tablet Take 1 tablet (800 mg total) by  "mouth every 6 (six) hours. for 7 days 28 tablet 0    methocarbamoL (ROBAXIN) 500 MG Tab Take 1-2 tablets (500-1,000 mg total) by mouth 3 (three) times daily as needed (pain/muscle spasm). 20 tablet 0    oxyCODONE (OXY-IR) 5 mg Cap Take 1 capsule (5 mg total) by mouth every 4 (four) hours as needed for Pain. 10 capsule 0    vitamin D (VITAMIN D3) 1000 units Tab Take 1,000 Units by mouth once daily.       No current facility-administered medications for this visit.       Review of patient's allergies indicates:   Allergen Reactions    Nitrofurantoin monohyd/m-cryst Hives    Sulfamethoxazole-trimethoprim Anaphylaxis, Itching, Rash, Swelling and Hives    [facility]extrom[facility]orphan      [facility]    Macrodantin [nitrofurantoin macrocrystalline]     Rifampin      [facility]    Sulfa (sulfonamide antibiotics)          Review of Systems:  No recent illness or significant changes to her health        OBJECTIVE:     BP (!) 127/59 (BP Location: Right arm, Patient Position: Sitting, BP Method: Large (Automatic))   Pulse 76   Ht 4' 11" (1.499 m)   Wt 61.7 kg (136 lb)   BMI 27.47 kg/m²       Physical Exam:  Gen: NAD, appears stated age  Neuro: normal without focal findings, mental status and speech normal  HEENT: NCAT, neck supple, PEERL  CV: RRR  Pulm: Breathing non-labored, chest wall movement equal bilaterally   Extremity:normal strength, no cyanosis or edema  Skin: Skin color, texture, turgor normal. No rashes or lesions  Psych: oriented to time, place and person, mood and affect are within normal limits    CTA reviewed, good  anatomy, small diastasis and herniation in umbilical stalk    ASSESSMENT/PLAN:       We discussed bilateral JUNAID flaps in detail.  Discussed flap monitoring, risks of take back/flap failure/ expected hospital course, use of drains, and recovery.  Risks including flap loss, fat necrosis, infection, wound breakdown, seroma, hematoma, scarring, need for reoperation, blood clots were all covered. "   Also went over post operative expectations in the hospital and once at home. Discussed multimodal pain regimine and [facility]    Plan is for immediate BL NSM with BL JUNAID flap reconstruction.  Will use olf wise pattern incision  Dr Carr as co-surgeon    Garcia Curry, DO  Plastic and Reconstructive Surgery    I spent a total of 25 minutes on the day of the visit.This includes face to face time and non-face to face time preparing to see the patient (eg, review of tests), obtaining and/or reviewing separately obtained history, documenting clinical information in the electronic or other health record, independently interpreting results and communicating results to the patient/family/caregiver, or care coordinator.

## 2023-02-01 NOTE — PROGRESS NOTES
Plastic Surgery History & Physical    SUBJECTIVE:   Chief complaint: pre-po visit    History of Present Illness:  57 y.o. female is here for per per-opaerative visit to discuss final surgical plan.  Has a left breast cancer and is BRCA2 positive.  Of note also had a BBR with DR Diaz.  Since her last visit we have decidede upon BL JUNAID flap recon.  She has had a CTA and has favorable  anatomy    Past Medical History:   Diagnosis Date    Asthma     General anesthetics causing adverse effect in therapeutic use     Sleep apnea     wears mouth device       Past Surgical History:   Procedure Laterality Date    CERVICAL BIOPSY       SECTION      EXPLORATORY LAPAROTOMY      FOOT SURGERY      REDUCTION OF BOTH BREASTS Bilateral 2021    Procedure: breast reduction;  Surgeon: Kevin Diaz MD;  Location: [facility] OR [address] [place] [address];  Service: Plastics;  Laterality: Bilateral;  LEFT BREAST: 720G  RIGHT BREAST: 728G    TUBAL LIGATION         No family history on file.    Social History     Socioeconomic History    Marital status: Single   Tobacco Use    Smoking status: Never    Smokeless tobacco: Never   Substance and Sexual Activity    Alcohol use: Yes     Comment: socially     Drug use: Never    Sexual activity: Yes     Partners: Male       Current Outpatient Medications   Medication Sig Dispense Refill    acetaminophen (TYLENOL) 500 MG tablet Take 2 tablets (1,000 mg total) by mouth every 8 (eight) hours. for 7 days 42 tablet 0    ALBUTEROL INHL Inhale into the lungs 4 (four) times daily as needed.      cephALEXin (KEFLEX) 500 MG capsule Take 1 capsule (500 mg total) by mouth every 12 (twelve) hours. for 7 days 14 capsule 0    cyanocobalamin, vitamin B-12, 50 mcg tablet Take 50 mcg by mouth once daily.      gabapentin (NEURONTIN) 100 MG capsule Take 3 capsules (300 mg total) by mouth 3 (three) times daily. for 7 days 63 capsule 0    ibuprofen (ADVIL,MOTRIN) 800 MG tablet Take 1 tablet (800 mg total) by  "mouth every 6 (six) hours. for 7 days 28 tablet 0    methocarbamoL (ROBAXIN) 500 MG Tab Take 1-2 tablets (500-1,000 mg total) by mouth 3 (three) times daily as needed (pain/muscle spasm). 20 tablet 0    oxyCODONE (OXY-IR) 5 mg Cap Take 1 capsule (5 mg total) by mouth every 4 (four) hours as needed for Pain. 10 capsule 0    vitamin D (VITAMIN D3) 1000 units Tab Take 1,000 Units by mouth once daily.       No current facility-administered medications for this visit.       Review of patient's allergies indicates:   Allergen Reactions    Nitrofurantoin monohyd/m-cryst Hives    Sulfamethoxazole-trimethoprim Anaphylaxis, Itching, Rash, Swelling and Hives    Dextromethorphan      [facility]    Macrodantin [nitrofurantoin macrocrystalline]     Rifampin      [facility]    Sulfa (sulfonamide antibiotics)          Review of Systems:  No recent illness or significant changes to her health        OBJECTIVE:     BP (!) 127/59 (BP Location: Right arm, Patient Position: Sitting, BP Method: Large (Automatic))   Pulse 76   Ht 4' 11" (1.499 m)   Wt 61.7 kg (136 lb)   BMI 27.47 kg/m²       Physical Exam:  Gen: NAD, appears stated age  Neuro: normal without focal findings, mental status and speech normal  HEENT: NCAT, neck supple, PEERL  CV: RRR  Pulm: Breathing non-labored, chest wall movement equal bilaterally   Extremity:normal strength, no cyanosis or edema  Skin: Skin color, texture, turgor normal. No rashes or lesions  Psych: oriented to time, place and person, mood and affect are within normal limits    CTA reviewed, good  anatomy, small diastasis and herniation in umbilical stalk    ASSESSMENT/PLAN:       We discussed bilateral JUNAID flaps in detail.  Discussed flap monitoring, risks of take back/flap failure/ expected hospital course, use of drains, and recovery.  Risks including flap loss, fat necrosis, infection, wound breakdown, seroma, hematoma, scarring, need for reoperation, blood clots were all covered. "   Also went over post operative expectations in the hospital and once at home. Discussed multimodal pain regimine and [facility]    Plan is for immediate BL NSM with BL JUNAID flap reconstruction.  Will use olf wise pattern incision  Dr Carr as co-surgeon    Garcia Curry, DO  Plastic and Reconstructive Surgery    I spent a total of 25 minutes on the day of the visit.This includes face to face time and non-face to face time preparing to see the patient (eg, review of tests), obtaining and/or reviewing separately obtained history, documenting clinical information in the electronic or other health record, independently interpreting results and communicating results to the patient/family/caregiver, or care coordinator.

## 2023-02-03 ENCOUNTER — PATIENT MESSAGE (OUTPATIENT)
Dept: SURGERY | Facility: OTHER | Age: 58
End: 2023-02-03
Payer: OTHER GOVERNMENT

## 2023-02-03 ENCOUNTER — PATIENT MESSAGE (OUTPATIENT)
Dept: OTOLARYNGOLOGY | Facility: CLINIC | Age: 58
End: 2023-02-03
Payer: OTHER GOVERNMENT

## 2023-02-07 ENCOUNTER — TELEPHONE (OUTPATIENT)
Dept: SURGERY | Facility: CLINIC | Age: 58
End: 2023-02-07
Payer: OTHER GOVERNMENT

## 2023-02-07 NOTE — TELEPHONE ENCOUNTER
Spoke to patient to give surgery arrival time of 0600 tomorrow at [facility].  Pt verbalized understanding of arrival time and location.  Patient had no other concerns at this time.

## 2023-02-08 ENCOUNTER — HOSPITAL ENCOUNTER (OUTPATIENT)
Dept: RADIOLOGY | Facility: OTHER | Age: 58
Discharge: HOME | DRG: 581 | End: 2023-02-08
Attending: SURGERY | Admitting: SURGERY
Payer: OTHER GOVERNMENT

## 2023-02-08 ENCOUNTER — HOSPITAL ENCOUNTER (INPATIENT)
Facility: OTHER | Age: 58
LOS: 3 days | Discharge: HOME | DRG: 581 | End: 2023-02-11
Attending: SURGERY | Admitting: SURGERY
Payer: OTHER GOVERNMENT

## 2023-02-08 ENCOUNTER — ANESTHESIA (OUTPATIENT)
Dept: SURGERY | Facility: OTHER | Age: 58
DRG: 581 | End: 2023-02-08
Payer: OTHER GOVERNMENT

## 2023-02-08 DIAGNOSIS — C50.412 MALIGNANT NEOPLASM OF UPPER-OUTER QUADRANT OF LEFT BREAST IN FEMALE, ESTROGEN RECEPTOR NEGATIVE: ICD-10-CM

## 2023-02-08 DIAGNOSIS — Z15.02 BRCA2 GENE MUTATION POSITIVE IN FEMALE: ICD-10-CM

## 2023-02-08 DIAGNOSIS — Z85.3 PERSONAL HISTORY OF BREAST CANCER: ICD-10-CM

## 2023-02-08 DIAGNOSIS — Z15.01 BRCA2 GENE MUTATION POSITIVE IN FEMALE: ICD-10-CM

## 2023-02-08 DIAGNOSIS — Z17.1 MALIGNANT NEOPLASM OF UPPER-OUTER QUADRANT OF LEFT BREAST IN FEMALE, ESTROGEN RECEPTOR NEGATIVE: Primary | ICD-10-CM

## 2023-02-08 DIAGNOSIS — Z17.1 MALIGNANT NEOPLASM OF UPPER-OUTER QUADRANT OF LEFT BREAST IN FEMALE, ESTROGEN RECEPTOR NEGATIVE: ICD-10-CM

## 2023-02-08 DIAGNOSIS — C50.412 MALIGNANT NEOPLASM OF UPPER-OUTER QUADRANT OF LEFT BREAST IN FEMALE, ESTROGEN RECEPTOR NEGATIVE: Primary | ICD-10-CM

## 2023-02-08 DIAGNOSIS — Z15.09 BRCA2 GENE MUTATION POSITIVE IN FEMALE: ICD-10-CM

## 2023-02-08 PROCEDURE — 27201423 OPTIME MED/SURG SUP & DEVICES STERILE SUPPLY: Performed by: SURGERY

## 2023-02-08 PROCEDURE — 88341 PR IHC OR ICC EACH ADD'L SINGLE ANTIBODY  STAINPR: ICD-10-PCS | Mod: 26,,, | Performed by: PATHOLOGY

## 2023-02-08 PROCEDURE — 88307 TISSUE EXAM BY PATHOLOGIST: CPT | Mod: 59 | Performed by: PATHOLOGY

## 2023-02-08 PROCEDURE — 71000039 HC RECOVERY, EACH ADD'L HOUR: Performed by: SURGERY

## 2023-02-08 PROCEDURE — 38792 PR IDENTIFY SENTINEL 2DE: ICD-10-PCS | Mod: 59,LT,, | Performed by: SURGERY

## 2023-02-08 PROCEDURE — 94761 N-INVAS EAR/PLS OXIMETRY MLT: CPT

## 2023-02-08 PROCEDURE — 63600175 PHARM REV CODE 636 W HCPCS: Performed by: PHYSICIAN ASSISTANT

## 2023-02-08 PROCEDURE — 88342 IMHCHEM/IMCYTCHM 1ST ANTB: CPT | Mod: 59 | Performed by: PATHOLOGY

## 2023-02-08 PROCEDURE — 88342 CHG IMMUNOCYTOCHEMISTRY: ICD-10-PCS | Mod: 26,,, | Performed by: PATHOLOGY

## 2023-02-08 PROCEDURE — 38792 RA TRACER ID OF SENTINL NODE: CPT | Mod: 59,LT,, | Performed by: SURGERY

## 2023-02-08 PROCEDURE — 25000003 PHARM REV CODE 250: Performed by: ANESTHESIOLOGY

## 2023-02-08 PROCEDURE — 88331 PR  PATH CONSULT IN SURG,W FRZ SEC: ICD-10-PCS | Mod: 26,,, | Performed by: PATHOLOGY

## 2023-02-08 PROCEDURE — 63600175 PHARM REV CODE 636 W HCPCS: Performed by: SURGERY

## 2023-02-08 PROCEDURE — 19303 MAST SIMPLE COMPLETE: CPT | Mod: 50,,, | Performed by: SURGERY

## 2023-02-08 PROCEDURE — C9290 INJ, BUPIVACAINE LIPOSOME: HCPCS | Performed by: SURGERY

## 2023-02-08 PROCEDURE — 88302 TISSUE EXAM BY PATHOLOGIST: CPT | Mod: 26,,, | Performed by: PATHOLOGY

## 2023-02-08 PROCEDURE — 88302 PR  SURG PATH,LEVEL II: ICD-10-PCS | Mod: 26,,, | Performed by: PATHOLOGY

## 2023-02-08 PROCEDURE — 19364 PR BREAST RECONSTRUC W FREE FLAP: ICD-10-PCS | Mod: 50,,, | Performed by: SURGERY

## 2023-02-08 PROCEDURE — 25000003 PHARM REV CODE 250: Performed by: STUDENT IN AN ORGANIZED HEALTH CARE EDUCATION/TRAINING PROGRAM

## 2023-02-08 PROCEDURE — C1729 CATH, DRAINAGE: HCPCS | Performed by: SURGERY

## 2023-02-08 PROCEDURE — 25000003 PHARM REV CODE 250: Performed by: NURSE ANESTHETIST, CERTIFIED REGISTERED

## 2023-02-08 PROCEDURE — 27800903 OPTIME MED/SURG SUP & DEVICES OTHER IMPLANTS: Performed by: SURGERY

## 2023-02-08 PROCEDURE — 38900 IO MAP OF SENT LYMPH NODE: CPT | Mod: LT,,, | Performed by: SURGERY

## 2023-02-08 PROCEDURE — 63600175 PHARM REV CODE 636 W HCPCS: Performed by: NURSE ANESTHETIST, CERTIFIED REGISTERED

## 2023-02-08 PROCEDURE — 37000009 HC ANESTHESIA EA ADD 15 MINS: Performed by: SURGERY

## 2023-02-08 PROCEDURE — 88307 TISSUE EXAM BY PATHOLOGIST: CPT | Mod: 26,,, | Performed by: PATHOLOGY

## 2023-02-08 PROCEDURE — 25000003 PHARM REV CODE 250: Performed by: PHYSICIAN ASSISTANT

## 2023-02-08 PROCEDURE — 76098 X-RAY EXAM SURGICAL SPECIMEN: CPT | Mod: TC

## 2023-02-08 PROCEDURE — 88341 IMHCHEM/IMCYTCHM EA ADD ANTB: CPT | Performed by: PATHOLOGY

## 2023-02-08 PROCEDURE — 38900 PR INTRAOPERATIVE SENTINEL LYMPH NODE ID W DYE INJECTION: ICD-10-PCS | Mod: LT,,, | Performed by: SURGERY

## 2023-02-08 PROCEDURE — 63600175 PHARM REV CODE 636 W HCPCS: Performed by: STUDENT IN AN ORGANIZED HEALTH CARE EDUCATION/TRAINING PROGRAM

## 2023-02-08 PROCEDURE — 38525 PR BIOPSY/REM LYMPH NODES, AXILLARY: ICD-10-PCS | Mod: 51,LT,, | Performed by: SURGERY

## 2023-02-08 PROCEDURE — 36000708 HC OR TIME LEV III 1ST 15 MIN: Performed by: SURGERY

## 2023-02-08 PROCEDURE — 38525 BIOPSY/REMOVAL LYMPH NODES: CPT | Mod: 51,LT,, | Performed by: SURGERY

## 2023-02-08 PROCEDURE — 15860 PR IV INJ TO TEST BLOOD FLOW IN FLAP/GRAFT: ICD-10-PCS | Mod: 51,,, | Performed by: SURGERY

## 2023-02-08 PROCEDURE — 88331 PATH CONSLTJ SURG 1 BLK 1SPC: CPT | Mod: 26,,, | Performed by: PATHOLOGY

## 2023-02-08 PROCEDURE — 19364 BRST RCNSTJ FREE FLAP: CPT | Mod: 50,82,, | Performed by: PLASTIC SURGERY

## 2023-02-08 PROCEDURE — 15860 IV NJX TST VASC FLO FLAP/GRF: CPT | Mod: 51,,, | Performed by: SURGERY

## 2023-02-08 PROCEDURE — A9520 TC99 TILMANOCEPT DIAG 0.5MCI: HCPCS

## 2023-02-08 PROCEDURE — 19303 PR MASTECTOMY, SIMPLE, COMPLETE: ICD-10-PCS | Mod: 50,,, | Performed by: SURGERY

## 2023-02-08 PROCEDURE — 37000008 HC ANESTHESIA 1ST 15 MINUTES: Performed by: SURGERY

## 2023-02-08 PROCEDURE — 63600175 PHARM REV CODE 636 W HCPCS: Performed by: ANESTHESIOLOGY

## 2023-02-08 PROCEDURE — P9045 ALBUMIN (HUMAN), 5%, 250 ML: HCPCS | Mod: JG | Performed by: NURSE ANESTHETIST, CERTIFIED REGISTERED

## 2023-02-08 PROCEDURE — 88302 TISSUE EXAM BY PATHOLOGIST: CPT | Mod: 59 | Performed by: PATHOLOGY

## 2023-02-08 PROCEDURE — 11000001 HC ACUTE MED/SURG PRIVATE ROOM

## 2023-02-08 PROCEDURE — 88307 PR  SURG PATH,LEVEL V: ICD-10-PCS | Mod: 26,,, | Performed by: PATHOLOGY

## 2023-02-08 PROCEDURE — 36000709 HC OR TIME LEV III EA ADD 15 MIN: Performed by: SURGERY

## 2023-02-08 PROCEDURE — 71000033 HC RECOVERY, INTIAL HOUR: Performed by: SURGERY

## 2023-02-08 PROCEDURE — 19364 PR BREAST RECONSTRUC W FREE FLAP: ICD-10-PCS | Mod: 50,82,, | Performed by: PLASTIC SURGERY

## 2023-02-08 PROCEDURE — 19364 BRST RCNSTJ FREE FLAP: CPT | Mod: 50,,, | Performed by: SURGERY

## 2023-02-08 PROCEDURE — 88341 IMHCHEM/IMCYTCHM EA ADD ANTB: CPT | Mod: 26,,, | Performed by: PATHOLOGY

## 2023-02-08 PROCEDURE — 88331 PATH CONSLTJ SURG 1 BLK 1SPC: CPT | Mod: 59 | Performed by: PATHOLOGY

## 2023-02-08 PROCEDURE — 25000003 PHARM REV CODE 250: Performed by: SURGERY

## 2023-02-08 PROCEDURE — 88342 IMHCHEM/IMCYTCHM 1ST ANTB: CPT | Mod: 26,,, | Performed by: PATHOLOGY

## 2023-02-08 DEVICE — DEVICE MICROVAS ANSTMTC 1.5MM: Type: IMPLANTABLE DEVICE | Site: BREAST | Status: FUNCTIONAL

## 2023-02-08 DEVICE — COUPLER MICROVAS ANSTMS 2.5MM: Type: IMPLANTABLE DEVICE | Site: BREAST | Status: FUNCTIONAL

## 2023-02-08 DEVICE — COUPLER 2.0MM: Type: IMPLANTABLE DEVICE | Site: BREAST | Status: FUNCTIONAL

## 2023-02-08 RX ORDER — PROCHLORPERAZINE EDISYLATE 5 MG/ML
5 INJECTION INTRAMUSCULAR; INTRAVENOUS EVERY 30 MIN PRN
Status: DISCONTINUED | OUTPATIENT
Start: 2023-02-08 | End: 2023-02-08 | Stop reason: HOSPADM

## 2023-02-08 RX ORDER — FENTANYL CITRATE 50 UG/ML
INJECTION, SOLUTION INTRAMUSCULAR; INTRAVENOUS
Status: DISCONTINUED | OUTPATIENT
Start: 2023-02-08 | End: 2023-02-08

## 2023-02-08 RX ORDER — DEXAMETHASONE SODIUM PHOSPHATE 4 MG/ML
INJECTION, SOLUTION INTRA-ARTICULAR; INTRALESIONAL; INTRAMUSCULAR; INTRAVENOUS; SOFT TISSUE
Status: DISCONTINUED | OUTPATIENT
Start: 2023-02-08 | End: 2023-02-08

## 2023-02-08 RX ORDER — ROCURONIUM BROMIDE 10 MG/ML
INJECTION, SOLUTION INTRAVENOUS
Status: DISCONTINUED | OUTPATIENT
Start: 2023-02-08 | End: 2023-02-08

## 2023-02-08 RX ORDER — CEFAZOLIN SODIUM 1 G/3ML
2 INJECTION, POWDER, FOR SOLUTION INTRAMUSCULAR; INTRAVENOUS
Status: COMPLETED | OUTPATIENT
Start: 2023-02-08 | End: 2023-02-08

## 2023-02-08 RX ORDER — ENOXAPARIN SODIUM 100 MG/ML
40 INJECTION SUBCUTANEOUS EVERY 24 HOURS
Status: DISCONTINUED | OUTPATIENT
Start: 2023-02-09 | End: 2023-02-11 | Stop reason: HOSPADM

## 2023-02-08 RX ORDER — ACETAMINOPHEN 500 MG
1000 TABLET ORAL EVERY 8 HOURS
Status: DISCONTINUED | OUTPATIENT
Start: 2023-02-08 | End: 2023-02-11 | Stop reason: HOSPADM

## 2023-02-08 RX ORDER — GABAPENTIN 300 MG/1
300 CAPSULE ORAL 3 TIMES DAILY
Status: DISCONTINUED | OUTPATIENT
Start: 2023-02-08 | End: 2023-02-11 | Stop reason: HOSPADM

## 2023-02-08 RX ORDER — CEFAZOLIN SODIUM 2 G/50ML
2 SOLUTION INTRAVENOUS
Status: COMPLETED | OUTPATIENT
Start: 2023-02-08 | End: 2023-02-09

## 2023-02-08 RX ORDER — MUPIROCIN 20 MG/G
OINTMENT TOPICAL
Status: DISPENSED | OUTPATIENT
Start: 2023-02-08

## 2023-02-08 RX ORDER — LIDOCAINE HYDROCHLORIDE 10 MG/ML
1 INJECTION, SOLUTION EPIDURAL; INFILTRATION; INTRACAUDAL; PERINEURAL ONCE
Status: ACTIVE | OUTPATIENT
Start: 2023-02-08

## 2023-02-08 RX ORDER — KETAMINE HCL IN 0.9 % NACL 50 MG/5 ML
SYRINGE (ML) INTRAVENOUS
Status: DISCONTINUED | OUTPATIENT
Start: 2023-02-08 | End: 2023-02-08

## 2023-02-08 RX ORDER — INDOCYANINE GREEN AND WATER 25 MG
KIT INJECTION
Status: DISCONTINUED | OUTPATIENT
Start: 2023-02-08 | End: 2023-02-08

## 2023-02-08 RX ORDER — HYDROMORPHONE HYDROCHLORIDE 2 MG/ML
INJECTION, SOLUTION INTRAMUSCULAR; INTRAVENOUS; SUBCUTANEOUS
Status: DISCONTINUED | OUTPATIENT
Start: 2023-02-08 | End: 2023-02-08

## 2023-02-08 RX ORDER — DIPHENHYDRAMINE HYDROCHLORIDE 50 MG/ML
12.5 INJECTION INTRAMUSCULAR; INTRAVENOUS EVERY 30 MIN PRN
Status: DISCONTINUED | OUTPATIENT
Start: 2023-02-08 | End: 2023-02-08 | Stop reason: HOSPADM

## 2023-02-08 RX ORDER — ENOXAPARIN SODIUM 100 MG/ML
30 INJECTION SUBCUTANEOUS
Status: COMPLETED | OUTPATIENT
Start: 2023-02-08 | End: 2023-02-08

## 2023-02-08 RX ORDER — ONDANSETRON 2 MG/ML
INJECTION INTRAMUSCULAR; INTRAVENOUS
Status: DISCONTINUED | OUTPATIENT
Start: 2023-02-08 | End: 2023-02-08

## 2023-02-08 RX ORDER — OXYCODONE HYDROCHLORIDE 5 MG/1
5 TABLET ORAL EVERY 6 HOURS PRN
Status: DISCONTINUED | OUTPATIENT
Start: 2023-02-08 | End: 2023-02-09

## 2023-02-08 RX ORDER — LIDOCAINE HYDROCHLORIDE AND EPINEPHRINE 10; 10 MG/ML; UG/ML
INJECTION, SOLUTION INFILTRATION; PERINEURAL
Status: DISCONTINUED | OUTPATIENT
Start: 2023-02-08 | End: 2023-02-08 | Stop reason: HOSPADM

## 2023-02-08 RX ORDER — IBUPROFEN 400 MG/1
800 TABLET ORAL EVERY 6 HOURS
Status: DISCONTINUED | OUTPATIENT
Start: 2023-02-09 | End: 2023-02-11 | Stop reason: HOSPADM

## 2023-02-08 RX ORDER — SODIUM CHLORIDE 0.9 % (FLUSH) 0.9 %
3 SYRINGE (ML) INJECTION
Status: DISCONTINUED | OUTPATIENT
Start: 2023-02-08 | End: 2023-02-08 | Stop reason: HOSPADM

## 2023-02-08 RX ORDER — HEPARIN SODIUM 10000 [USP'U]/ML
INJECTION, SOLUTION INTRAVENOUS; SUBCUTANEOUS
Status: DISCONTINUED | OUTPATIENT
Start: 2023-02-08 | End: 2023-02-08 | Stop reason: HOSPADM

## 2023-02-08 RX ORDER — ONDANSETRON 2 MG/ML
4 INJECTION INTRAMUSCULAR; INTRAVENOUS EVERY 6 HOURS PRN
Status: DISCONTINUED | OUTPATIENT
Start: 2023-02-08 | End: 2023-02-11 | Stop reason: HOSPADM

## 2023-02-08 RX ORDER — PROPOFOL 10 MG/ML
VIAL (ML) INTRAVENOUS
Status: DISCONTINUED | OUTPATIENT
Start: 2023-02-08 | End: 2023-02-08

## 2023-02-08 RX ORDER — LIDOCAINE HYDROCHLORIDE 10 MG/ML
0.5 INJECTION, SOLUTION EPIDURAL; INFILTRATION; INTRACAUDAL; PERINEURAL ONCE
Status: DISCONTINUED | OUTPATIENT
Start: 2023-02-08 | End: 2023-02-08 | Stop reason: HOSPADM

## 2023-02-08 RX ORDER — MEPERIDINE HYDROCHLORIDE 25 MG/ML
12.5 INJECTION INTRAMUSCULAR; INTRAVENOUS; SUBCUTANEOUS ONCE AS NEEDED
Status: DISCONTINUED | OUTPATIENT
Start: 2023-02-08 | End: 2023-02-08 | Stop reason: HOSPADM

## 2023-02-08 RX ORDER — PROCHLORPERAZINE EDISYLATE 5 MG/ML
5 INJECTION INTRAMUSCULAR; INTRAVENOUS EVERY 6 HOURS PRN
Status: DISCONTINUED | OUTPATIENT
Start: 2023-02-08 | End: 2023-02-11 | Stop reason: HOSPADM

## 2023-02-08 RX ORDER — SODIUM CHLORIDE 0.9 % (FLUSH) 0.9 %
10 SYRINGE (ML) INJECTION
Status: ACTIVE | OUTPATIENT
Start: 2023-02-08

## 2023-02-08 RX ORDER — ACETAMINOPHEN 500 MG
1000 TABLET ORAL
Status: COMPLETED | OUTPATIENT
Start: 2023-02-08 | End: 2023-02-08

## 2023-02-08 RX ORDER — VECURONIUM BROMIDE FOR INJECTION 1 MG/ML
INJECTION, POWDER, LYOPHILIZED, FOR SOLUTION INTRAVENOUS
Status: DISCONTINUED | OUTPATIENT
Start: 2023-02-08 | End: 2023-02-08

## 2023-02-08 RX ORDER — EPHEDRINE SULFATE 50 MG/ML
INJECTION, SOLUTION INTRAVENOUS
Status: DISCONTINUED | OUTPATIENT
Start: 2023-02-08 | End: 2023-02-08

## 2023-02-08 RX ORDER — ALBUMIN HUMAN 50 G/1000ML
SOLUTION INTRAVENOUS CONTINUOUS PRN
Status: DISCONTINUED | OUTPATIENT
Start: 2023-02-08 | End: 2023-02-08

## 2023-02-08 RX ORDER — BUPIVACAINE HYDROCHLORIDE 2.5 MG/ML
INJECTION, SOLUTION EPIDURAL; INFILTRATION; INTRACAUDAL
Status: DISCONTINUED | OUTPATIENT
Start: 2023-02-08 | End: 2023-02-08 | Stop reason: HOSPADM

## 2023-02-08 RX ORDER — CEPHALEXIN 500 MG/1
500 CAPSULE ORAL EVERY 6 HOURS
Status: DISCONTINUED | OUTPATIENT
Start: 2023-02-09 | End: 2023-02-11 | Stop reason: HOSPADM

## 2023-02-08 RX ORDER — HYDROMORPHONE HYDROCHLORIDE 1 MG/ML
0.5 INJECTION, SOLUTION INTRAMUSCULAR; INTRAVENOUS; SUBCUTANEOUS EVERY 4 HOURS PRN
Status: DISCONTINUED | OUTPATIENT
Start: 2023-02-08 | End: 2023-02-11 | Stop reason: HOSPADM

## 2023-02-08 RX ORDER — DOCUSATE SODIUM 100 MG/1
100 CAPSULE, LIQUID FILLED ORAL EVERY 12 HOURS
Status: DISCONTINUED | OUTPATIENT
Start: 2023-02-08 | End: 2023-02-11 | Stop reason: HOSPADM

## 2023-02-08 RX ORDER — SODIUM CHLORIDE 9 MG/ML
INJECTION, SOLUTION INTRAVENOUS CONTINUOUS
Status: DISCONTINUED | OUTPATIENT
Start: 2023-02-08 | End: 2023-02-09

## 2023-02-08 RX ORDER — PHENYLEPHRINE HYDROCHLORIDE 10 MG/ML
INJECTION INTRAVENOUS
Status: DISCONTINUED | OUTPATIENT
Start: 2023-02-08 | End: 2023-02-08

## 2023-02-08 RX ORDER — MIDAZOLAM HYDROCHLORIDE 1 MG/ML
INJECTION INTRAMUSCULAR; INTRAVENOUS
Status: DISCONTINUED | OUTPATIENT
Start: 2023-02-08 | End: 2023-02-08

## 2023-02-08 RX ORDER — METHOCARBAMOL 500 MG/1
500 TABLET, FILM COATED ORAL EVERY 6 HOURS
Status: DISCONTINUED | OUTPATIENT
Start: 2023-02-09 | End: 2023-02-11 | Stop reason: HOSPADM

## 2023-02-08 RX ORDER — LIDOCAINE HYDROCHLORIDE 40 MG/ML
INJECTION, SOLUTION RETROBULBAR
Status: DISCONTINUED | OUTPATIENT
Start: 2023-02-08 | End: 2023-02-08 | Stop reason: HOSPADM

## 2023-02-08 RX ORDER — HYDROMORPHONE HYDROCHLORIDE 2 MG/ML
0.4 INJECTION, SOLUTION INTRAMUSCULAR; INTRAVENOUS; SUBCUTANEOUS EVERY 5 MIN PRN
Status: DISCONTINUED | OUTPATIENT
Start: 2023-02-08 | End: 2023-02-08 | Stop reason: HOSPADM

## 2023-02-08 RX ORDER — ALBUTEROL SULFATE 90 UG/1
1 AEROSOL, METERED RESPIRATORY (INHALATION) EVERY 6 HOURS PRN
Status: DISCONTINUED | OUTPATIENT
Start: 2023-02-08 | End: 2023-02-11 | Stop reason: HOSPADM

## 2023-02-08 RX ORDER — SODIUM CHLORIDE, SODIUM LACTATE, POTASSIUM CHLORIDE, CALCIUM CHLORIDE 600; 310; 30; 20 MG/100ML; MG/100ML; MG/100ML; MG/100ML
INJECTION, SOLUTION INTRAVENOUS CONTINUOUS
Status: DISCONTINUED | OUTPATIENT
Start: 2023-02-08 | End: 2023-02-09

## 2023-02-08 RX ORDER — OXYCODONE HYDROCHLORIDE 5 MG/1
5 TABLET ORAL
Status: DISCONTINUED | OUTPATIENT
Start: 2023-02-08 | End: 2023-02-08 | Stop reason: HOSPADM

## 2023-02-08 RX ADMIN — VECURONIUM BROMIDE FOR INJECTION 2 MG: 1 INJECTION, POWDER, LYOPHILIZED, FOR SOLUTION INTRAVENOUS at 12:02

## 2023-02-08 RX ADMIN — HYDROMORPHONE HYDROCHLORIDE 0.4 MG: 2 INJECTION INTRAMUSCULAR; INTRAVENOUS; SUBCUTANEOUS at 11:02

## 2023-02-08 RX ADMIN — EPHEDRINE SULFATE 20 MG: 50 INJECTION INTRAVENOUS at 08:02

## 2023-02-08 RX ADMIN — ENOXAPARIN SODIUM 30 MG: 30 INJECTION, SOLUTION INTRAVENOUS; SUBCUTANEOUS at 06:02

## 2023-02-08 RX ADMIN — DEXAMETHASONE SODIUM PHOSPHATE 8 MG: 4 INJECTION, SOLUTION INTRAMUSCULAR; INTRAVENOUS at 09:02

## 2023-02-08 RX ADMIN — SUGAMMADEX 200 MG: 100 INJECTION, SOLUTION INTRAVENOUS at 03:02

## 2023-02-08 RX ADMIN — OXYCODONE 5 MG: 5 TABLET ORAL at 06:02

## 2023-02-08 RX ADMIN — PHENYLEPHRINE HYDROCHLORIDE 50 MCG: 10 INJECTION INTRAVENOUS at 03:02

## 2023-02-08 RX ADMIN — MIDAZOLAM HYDROCHLORIDE 2 MG: 1 INJECTION, SOLUTION INTRAMUSCULAR; INTRAVENOUS at 08:02

## 2023-02-08 RX ADMIN — EPHEDRINE SULFATE 15 MG: 50 INJECTION INTRAVENOUS at 09:02

## 2023-02-08 RX ADMIN — VECURONIUM BROMIDE FOR INJECTION 3 MG: 1 INJECTION, POWDER, LYOPHILIZED, FOR SOLUTION INTRAVENOUS at 02:02

## 2023-02-08 RX ADMIN — ROCURONIUM BROMIDE 50 MG: 10 INJECTION, SOLUTION INTRAVENOUS at 08:02

## 2023-02-08 RX ADMIN — ACETAMINOPHEN 1000 MG: 500 TABLET ORAL at 06:02

## 2023-02-08 RX ADMIN — CEFAZOLIN 2 G: 1 INJECTION, POWDER, FOR SOLUTION INTRAMUSCULAR; INTRAVENOUS at 03:02

## 2023-02-08 RX ADMIN — HYDROMORPHONE HYDROCHLORIDE 0.4 MG: 2 INJECTION INTRAMUSCULAR; INTRAVENOUS; SUBCUTANEOUS at 10:02

## 2023-02-08 RX ADMIN — PROPOFOL 50 MG: 10 INJECTION, EMULSION INTRAVENOUS at 08:02

## 2023-02-08 RX ADMIN — PHENYLEPHRINE HYDROCHLORIDE 200 MCG: 10 INJECTION INTRAVENOUS at 01:02

## 2023-02-08 RX ADMIN — GABAPENTIN 300 MG: 300 CAPSULE ORAL at 09:02

## 2023-02-08 RX ADMIN — PHENYLEPHRINE HYDROCHLORIDE 100 MCG: 10 INJECTION INTRAVENOUS at 03:02

## 2023-02-08 RX ADMIN — PROPOFOL 200 MG: 10 INJECTION, EMULSION INTRAVENOUS at 08:02

## 2023-02-08 RX ADMIN — VECURONIUM BROMIDE FOR INJECTION 3 MG: 1 INJECTION, POWDER, LYOPHILIZED, FOR SOLUTION INTRAVENOUS at 11:02

## 2023-02-08 RX ADMIN — CEPHALEXIN 500 MG: 500 CAPSULE ORAL at 11:02

## 2023-02-08 RX ADMIN — PHENYLEPHRINE HYDROCHLORIDE 200 MCG: 10 INJECTION INTRAVENOUS at 12:02

## 2023-02-08 RX ADMIN — PHENYLEPHRINE HYDROCHLORIDE 100 MCG: 10 INJECTION INTRAVENOUS at 02:02

## 2023-02-08 RX ADMIN — CARBOXYMETHYLCELLULOSE SODIUM 3 DROP: 2.5 SOLUTION/ DROPS OPHTHALMIC at 08:02

## 2023-02-08 RX ADMIN — OXYCODONE 5 MG: 5 TABLET ORAL at 11:02

## 2023-02-08 RX ADMIN — IBUPROFEN 800 MG: 400 TABLET, FILM COATED ORAL at 11:02

## 2023-02-08 RX ADMIN — HYDROMORPHONE HYDROCHLORIDE 0.2 MG: 2 INJECTION INTRAMUSCULAR; INTRAVENOUS; SUBCUTANEOUS at 12:02

## 2023-02-08 RX ADMIN — SODIUM CHLORIDE, SODIUM LACTATE, POTASSIUM CHLORIDE, AND CALCIUM CHLORIDE: 600; 310; 30; 20 INJECTION, SOLUTION INTRAVENOUS at 12:02

## 2023-02-08 RX ADMIN — DOCUSATE SODIUM 100 MG: 100 CAPSULE, LIQUID FILLED ORAL at 09:02

## 2023-02-08 RX ADMIN — CEFAZOLIN SODIUM 2 G: 2 SOLUTION INTRAVENOUS at 11:02

## 2023-02-08 RX ADMIN — PROCHLORPERAZINE EDISYLATE 5 MG: 5 INJECTION INTRAMUSCULAR; INTRAVENOUS at 04:02

## 2023-02-08 RX ADMIN — VECURONIUM BROMIDE FOR INJECTION 4 MG: 1 INJECTION, POWDER, LYOPHILIZED, FOR SOLUTION INTRAVENOUS at 01:02

## 2023-02-08 RX ADMIN — ALBUMIN (HUMAN): 12.5 SOLUTION INTRAVENOUS at 12:02

## 2023-02-08 RX ADMIN — FENTANYL CITRATE 100 MCG: 50 INJECTION, SOLUTION INTRAMUSCULAR; INTRAVENOUS at 08:02

## 2023-02-08 RX ADMIN — VECURONIUM BROMIDE FOR INJECTION 3 MG: 1 INJECTION, POWDER, LYOPHILIZED, FOR SOLUTION INTRAVENOUS at 10:02

## 2023-02-08 RX ADMIN — CEFAZOLIN 2 G: 1 INJECTION, POWDER, FOR SOLUTION INTRAMUSCULAR; INTRAVENOUS at 11:02

## 2023-02-08 RX ADMIN — PHENYLEPHRINE HYDROCHLORIDE 100 MCG: 10 INJECTION INTRAVENOUS at 01:02

## 2023-02-08 RX ADMIN — VECURONIUM BROMIDE FOR INJECTION 4 MG: 1 INJECTION, POWDER, LYOPHILIZED, FOR SOLUTION INTRAVENOUS at 10:02

## 2023-02-08 RX ADMIN — Medication 50 MG: at 08:02

## 2023-02-08 RX ADMIN — SODIUM CHLORIDE, SODIUM LACTATE, POTASSIUM CHLORIDE, AND CALCIUM CHLORIDE: 600; 310; 30; 20 INJECTION, SOLUTION INTRAVENOUS at 07:02

## 2023-02-08 RX ADMIN — CEFAZOLIN 2 G: 1 INJECTION, POWDER, FOR SOLUTION INTRAMUSCULAR; INTRAVENOUS at 08:02

## 2023-02-08 RX ADMIN — SODIUM CHLORIDE, SODIUM LACTATE, POTASSIUM CHLORIDE, AND CALCIUM CHLORIDE: 600; 310; 30; 20 INJECTION, SOLUTION INTRAVENOUS at 01:02

## 2023-02-08 RX ADMIN — MUPIROCIN: 20 OINTMENT TOPICAL at 06:02

## 2023-02-08 RX ADMIN — METHOCARBAMOL 500 MG: 500 TABLET ORAL at 11:02

## 2023-02-08 RX ADMIN — EPHEDRINE SULFATE 15 MG: 50 INJECTION INTRAVENOUS at 10:02

## 2023-02-08 RX ADMIN — ACETAMINOPHEN 1000 MG: 500 TABLET, FILM COATED ORAL at 09:02

## 2023-02-08 RX ADMIN — INDOCYANINE GREEN 10 MG: KIT INTRAVENOUS at 12:02

## 2023-02-08 RX ADMIN — HYDROMORPHONE HYDROCHLORIDE 0.5 MG: 1 INJECTION, SOLUTION INTRAMUSCULAR; INTRAVENOUS; SUBCUTANEOUS at 09:02

## 2023-02-08 RX ADMIN — PROPOFOL 100 MG: 10 INJECTION, EMULSION INTRAVENOUS at 01:02

## 2023-02-08 RX ADMIN — HYDROMORPHONE HYDROCHLORIDE 0.4 MG: 2 INJECTION, SOLUTION INTRAMUSCULAR; INTRAVENOUS; SUBCUTANEOUS at 04:02

## 2023-02-08 RX ADMIN — ONDANSETRON 4 MG: 2 INJECTION INTRAMUSCULAR; INTRAVENOUS at 03:02

## 2023-02-08 RX ADMIN — VECURONIUM BROMIDE FOR INJECTION 4 MG: 1 INJECTION, POWDER, LYOPHILIZED, FOR SOLUTION INTRAVENOUS at 11:02

## 2023-02-08 RX ADMIN — ALBUMIN (HUMAN): 12.5 SOLUTION INTRAVENOUS at 08:02

## 2023-02-08 RX ADMIN — PHENYLEPHRINE HYDROCHLORIDE 150 MCG: 10 INJECTION INTRAVENOUS at 02:02

## 2023-02-08 NOTE — H&P
Breast Surgery  [facility]  Department of Surgery        REFERRING PROVIDER: Aaareferral Self  No address on file     Chief Complaint: Breast Cancer (New Patient Right Breast Carcinoma 11/17/22 .)        Subjective:      Patient ID: Trinity Morin is a 57 y.o. female who presents with IDC of the left breast.     She underwent bilateral breast MRI 5/27/22 which showed: In the left breast upper outer quadrant 4 cm from the nipple, there is a rim enhancing 6 x 7 mm mass.  In the central left breast, middle depth, 4 cm from the nipple there is a 3 mm enhancing focus. US of the left breast on 06/22/22 showed: left breast 02:00 o'clock 3 cm from the nipple show an oval, hypoechoic mass measuring 4 x 4 x 6 mm, likely corresponding to the upper outer quadrant mass seen on MRI.  An ultrasound correlate for the central focus of enhancement is not identified.  Normal lymph nodes in the left axilla.  A six-month follow-up ultrasound was recommended.      Follow-up mammogram (11/09/22) showed: In the right breast, upper outer quadrant anterior depth there is a new group of calcifications measuring 4 mm with an associated focal asymmetry.  In the left breast upper outer quadrant, anterior depth, there is an irregular mass. Bilateral ultrasound 11/09/22 showed:  right breast 11:00 o'clock, 2 cm from the nipple show an irregular hypoechoic measuring 5 x 4 x 4 mm corresponding to the mammographic finding and left breast 02:00 o'clock, 3 cm from the nipple show an irregular hypoechoic mass measuring 6 x 10 x 6 mm, previously 4 x 4 x 6 mm and corresponding to the mammographic and prior MRI finding..  No abnormal lymph nodes in either axilla. A ultrasound guided biopsy was performed on 11/17/22 with pathology revealing infiltrating ductal carcinoma of the left breast and benign breast tissue with fat necrosis, fibrosis, and associated microcalcification of the right breast.     Patient does routinely do self breast exams.   Patient has not noted a change on breast exam.  Patient denies nipple discharge. Patient denies to previous breast biopsy. Patient denies a personal history of breast cancer.     Previous breast reduction with Dr. Diaz 21.      Findings at that time were the following:   Tumor size: 1 cm   Tumor ndgndrndanddndend:nd nd2nd Estrogen Receptor: -   Progesterone Receptor: -   Her-2 nixon: -   Lymph node status: pending   Lymphatic invasion: pending      GYN History:  Age of menarche was 14. Age of menopause was early 50's.  Patient denies hormonal therapy. Patient is . Age of first live birth was 29. Patient did not breast feed.          Past Medical History:   Diagnosis Date    Asthma      General anesthetics causing adverse effect in therapeutic use      Sleep apnea              Past Surgical History:   Procedure Laterality Date    CERVICAL BIOPSY         SECTION        EXPLORATORY LAPAROTOMY        FOOT SURGERY        REDUCTION OF BOTH BREASTS Bilateral 2021     Procedure: breast reduction;  Surgeon: Kevin Diaz MD;  Location: [facility] OR [address] [place] [address];  Service: Plastics;  Laterality: Bilateral;  LEFT BREAST: 720G  RIGHT BREAST: 728G    TUBAL LIGATION                 Current Outpatient Medications on File Prior to Visit   Medication Sig Dispense Refill    ALBUTEROL INHL Inhale into the lungs 4 (four) times daily as needed.        cyanocobalamin, vitamin B-12, 50 mcg tablet Take 50 mcg by mouth once daily.        B cmplx 4/vit D3/C/folic/zinc (VITAL-D RX ORAL) Take by mouth once daily. States takes VIT D5        [DISCONTINUED] vitamin E acetate (VITAMIN E ORAL) Take by mouth.          No current facility-administered medications on file prior to visit.      Social History               Socioeconomic History    Marital status: Single   Tobacco Use    Smoking status: Never    Smokeless tobacco: Never   Substance and Sexual Activity    Alcohol use: Yes       Comment: socially     Drug use: Never         History  "reviewed. No pertinent family history.      Review of Systems   Constitutional:  Negative for appetite change, chills, fever and unexpected weight change.   HENT:  Negative for facial swelling, postnasal drip and sore throat.    Eyes:  Negative for redness and itching.   Respiratory:  Negative for chest tightness and shortness of breath.    Cardiovascular:  Negative for chest pain and palpitations.   Gastrointestinal:  Negative for blood in stool, diarrhea, nausea and vomiting.   Genitourinary:  Negative for difficulty urinating and dysuria.   Musculoskeletal:  Negative for arthralgias and joint swelling.   Skin:  Negative for rash and wound.   Neurological:  Negative for dizziness and syncope.   Hematological:  Negative for adenopathy.   Psychiatric/Behavioral:  Negative for agitation. The patient is not nervous/anxious.    Objective:   /69 (BP Location: Left arm, Patient Position: Sitting, BP Method: Medium (Automatic))   Pulse 73   Temp 97.9 °F (36.6 °C) (Oral)   Ht 4' 11" (1.499 m)   Wt 61.7 kg (136 lb)   SpO2 97%   BMI 27.47 kg/m²      Physical Exam   Vitals reviewed.  Constitutional: She is oriented to person, place, and time.   HENT:   Head: Normocephalic and atraumatic.   Nose: Nose normal.   Eyes: Pupils are equal, round, and reactive to light. Right eye exhibits no discharge. Left eye exhibits no discharge.   Pulmonary/Chest: Effort normal and breath sounds normal. No stridor. No respiratory distress. She exhibits no mass, no tenderness and no edema. Right breast exhibits no inverted nipple, no mass, no nipple discharge, no skin change and no tenderness. Left breast exhibits no inverted nipple, no mass, no nipple discharge, no skin change and no tenderness. No breast swelling or bleeding. Breasts are symmetrical.      Abdominal: Normal appearance.   Genitourinary: No breast swelling or bleeding.   Neurological: She is alert and oriented to person, place, and time.   Skin: Skin is warm and dry. "      Psychiatric: Her behavior is normal. Mood, judgment and thought content normal.      Radiology review: Images personally reviewed by me in the clinic.           Final Pathologic Diagnosis   Date/Time Value Ref Range Status   11/17/2022 02:58 PM     Final     1. Right breast mass (11 o'clock position, 2 cm from nipple), biopsy  ([id number]):      -  Benign breast tissue with fat necrosis, fibrosis and associated  microcalcifications      -  Negative for atypia or malignancy  2. Left breast mass (2 o'clock position, 3 cm from nipple), biopsy  ([id number]):      -  Invasive carcinoma of no special type (ductal), see synoptic report  SYNOPTIC REPORT  PROCEDURE:  Biopsy  SPECIMEN LATERALITY:  Left breast  TUMOR SITE:  2 o'clock position, 3 cm from nipple  HISTOLOGIC TYPE:  Invasive carcinoma of no special type (ductal)  HISTOLOGIC thGthRthAthDthEth:th th4th of 3         Tubule formation:  3         Pleomorphism:  3         Mitotic rate:  3  TUMOR SIZE:  5 mm in greatest linear dimension  DUCTAL CARCINOMA IN SITU (DCIS):  Not identified  LYMPHOVASCULAR INVASION: Not identified  MICROCALCIFICATIONS:  Not identified  BREAST BIOMARKERS (per outside facility report/biomarker were NOT present for  review):          ER:  Negative (less than 1%)          OR: Negative (less than 1%)          Her2:  Negative by FISH          Ki67:  78.5%          Comment:       Interp By Apple Srivastava M.D., Signed on 12/19/2022 at 13:26         [facility] Mammo Interpretation Of Outside Films     Result Date: 12/19/2022  INDICATION: Interpretation of outside films, breast cancer Images submitted for review: Breast MRI 05/27/2022, limited left breast ultrasound 06/22/2022, bilateral diagnostic mammogram 11/09/2022, limited bilateral breast ultrasound 11/09/2022, bilateral ultrasound-guided breast biopsies 11/17/2022 with post biopsy mammogram Comparisons: Screening mammograms 11/08/2019 and 11/03/2018 [facility] submitted from [facility];  RUDDY Lai Breast MRI 05/27/2022: There are scattered fibroglandular densities.  There is minimal background parenchymal enhancement.  No enlarged axillary or internal mammary lymph nodes.  There are postsurgical changes from bilateral breast reduction seen in both breasts.  No abnormal enhancement or suspicious mass in the right breast.  In the left breast upper outer quadrant 4 cm from the nipple, there is a rim enhancing 6 x 7 mm mass.  In the central left breast, middle depth, 4 cm from the nipple there is a 3 mm enhancing focus. Left breast ultrasound 06/22/2022: Please note due to the user dependent nature of ultrasound, evaluation is limited. Images labeled left breast 02:00 o'clock 3 cm from the nipple show an oval, hypoechoic mass measuring 4 x 4 x 6 mm, likely corresponding to the upper outer quadrant mass seen on MRI.  An ultrasound correlate for the central focus of enhancement is not identified.  Normal lymph nodes in the left axilla.  A six-month follow-up ultrasound was recommended. Bilateral diagnostic mammogram and bilateral breast ultrasound 11/09/2022: There are scattered fibroglandular densities.  Postsurgical changes from bilateral reduction mammoplasty.  In the right breast, upper outer quadrant anterior depth there is a new group of calcifications measuring 4 mm with an associated focal asymmetry.  In the left breast upper outer quadrant, anterior depth, there is an irregular mass. US images labeled right breast 11:00 o'clock, 2 cm from the nipple show an irregular hypoechoic measuring 5 x 4 x 4 mm corresponding to the mammographic finding. US images labeled left breast 02:00 o'clock, 3 cm from the nipple show an irregular hypoechoic mass measuring 6 x 10 x 6 mm, previously 4 x 4 x 6 mm and corresponding to the mammographic and prior MRI finding..  No abnormal lymph nodes in either axilla. Ultrasound-guided bilateral breast biopsy 11/17/2022: Images show a percutaneous needle traversing the  right breast mass at 11:00 o'clock and the left breast mass at 02:00 o'clock.  Post biopsy mammogram shows a wing shaped marker in the right breast upper outer quadrant, and a ribbon shaped marker in the left breast upper outer quadrant mass. Pathology showed fat necrosis of the right breast biopsy and invasive ductal carcinoma for the left breast biopsy. IMPRESSION: Left breast mass at 02:00 o'clock consistent with invasive ductal carcinoma. Left breast 3 mm enhancing focus in the central region, middle depth seen on MRI 05/27/2022 is indeterminate. No mammographic or US correlate identified on images submitted. No evidence of malignancy in the right breast. BI-RADS: 4 If patient desires breast conservation therapy, MRI guided biopsy for left breast central focus is recommended. Clinical management of known left breast cancer. Patient is established with the breast surgery clinic.      Assessment:       1. Malignant neoplasm of upper-outer quadrant of left breast in female, estrogen receptor negative    2. BRCA2 gene mutation positive in female        Plan:      Options for management were discussed with the patient and her family. We reviewed the existing data noting the equivalency of breast conserving surgery with radiation therapy and mastectomy. We also reviewed the guidelines of the National Comprehensive Cancer Network for stage I breast carcinoma. We discussed the need for lumpectomy margins to be negative for carcinoma, the necessity for postoperative radiation therapy after breast conservation in most cases, the possibility of a failed or false negative sentinel lymph node biopsy and the potential need for complete lymphadenectomy for a failed or positive sentinel lymph node biopsy were fully discussed. In the setting of mastectomy, delayed or immediate reconstruction options are available and were discussed.      In the setting of lumpectomy, radiation therapy would be recommended majority of the time.   The duration and treatment side effects were discussed with the patient.  This will coordinated with the radiation oncologist pending final pathology.     We also discussed the role of systemic therapy in the treatment of early stage breast cancer.  We discussed that this is based on tumor biology and adriano status and will be determined based on final pathology.  We discussed that if the cancer is hormone positive, endocrine therapy would be recommended in most cases and its use can reduce the risk of recurrence as well as improve survival. Side effects of treatment were briefly discussed. We also discussed the potential role for chemotherapy based on a number of factors such as tumor phenotype (ER+ vs. triple negative vs. Hky1ejv+) and this would be determined in coordination with the medical oncologist.     Patient met with Dr. Mendoza who felt given the size of her tumor, recommended surgery first followed by adjuvant chemotherapy with Taxotere and Cytoxan pending negative nodes.      The patient, in consultation with her family, has elected to proceed with bilateral mastectomy and sentinel lymph node biopsy. Patient is interested in JUNAID flap reconstruction. She will meet with Dr. Curry in Plastic Surgery to discuss at visit today after our visit. The operative risks of bleeding, infection, recurrence, scarring, and anesthetic complications and the possibility of requiring further surgery were all noted and informed consent obtained.  Ok for NSM   Surgery scheduled. Follow-up in clinic roughly 14 days after surgery.      Patient was educated on breast cancer, receptors, wire localization lumpectomy, mastectomy, sentinel lymph node mapping and biopsy, axillary lymph node dissection, reconstruction, breast prosthesis with post-mastectomy bra and radiation therapy. Patient was given patient information binder including [facility] breast cancer treatment brochure.  All her questions were answered.     Total time spent with  the patient: 65 minutes.  45 minutes of face to face consultation and 20 minutes of chart review and coordination of care.

## 2023-02-08 NOTE — BRIEF OP NOTE
[facility] Surgery ([place])  Brief Operative Note    SUMMARY     Surgery Date: 2/8/2023     Surgeon(s) and Role:  Panel 1:     * Kim Jiménez MD - Primary  Panel 2:     * Keivn Curry DO - Primary     * Gopal Hoover MD - Assisting        Pre-op Diagnosis:  Malignant neoplasm of upper-outer quadrant of left female breast, unspecified estrogen receptor status [C50.412]    Post-op Diagnosis:  Post-Op Diagnosis Codes:     * Malignant neoplasm of upper-outer quadrant of left female breast, unspecified estrogen receptor status [C50.412]    Procedure(s) (LRB):  MASTECTOMY, BILATERAL (Bilateral)  BIOPSY, LYMPH NODE, SENTINEL LEFT (Left)  INJECTION, FOR SENTINEL NODE IDENTIFICATION (Left)    RECONSTRUCTION, BREAST, USING JUNAID SKIN FLAP (Bilateral) - to be dictated by Dr. Curry    Anesthesia: General    Operative Findings: S/p bilateral mastectomy with L SLNB. Two left axillary sentinel nodes sent for frozen. Negative for carcinoma on frozen sectioning. Patient tolerated the procedure well without complication. Plastics to complete JUNAID flap reconstruction.      Estimated Blood Loss: <50cc    Estimated Blood Loss has been documented.         Specimens:   Specimen (24h ago, onward)       Start     Ordered    02/08/23 1520  Specimen to Pathology, Surgery Breast  Once        Comments: Pre-op Diagnosis: Malignant neoplasm of upper-outer quadrant of left female breast, unspecified estrogen receptor status [C50.412]Procedure(s):MASTECTOMY, BILATERALBIOPSY, LYMPH NODE, SENTINEL LEFTINJECTION, FOR SENTINEL NODE IDENTIFICATIONMASTECTOMY, WITH SENTINEL NODE BIOPSY AND AXILLARY LYMPHADENECTOMYRECONSTRUCTION, BREAST, USING JUNAID SKIN FLAP Number of specimens: 6Name of specimens: 1. Left Breast, short stitch superior, long lateral, loop areola 2. Left axillary sentinel lymph node hot 260 (FROZEN)3. Left axillary sentinel lymph node hot 60 (FROZEN)4. Right Breast Short stitch superior, long lateral, loop areola 5. Right  Breast Skin 6. Left Breast Skin     References:    Click here for ordering Quick Tip   Question Answer Comment   Procedure Type: Breast    Specimen Class: Known or suspected malignancy    Which provider would you like to cc? MARCIO HATFIELD    Which provider would you like to cc? DELMIS DAVIS    Release to patient Immediate        02/08/23 1520    02/08/23 1156  Specimen to Pathology, Surgery Breast  Once        Comments: Pre-op Diagnosis: Malignant neoplasm of upper-outer quadrant of left female breast, unspecified estrogen receptor status [C50.412]Procedure(s):MASTECTOMY, BILATERALBIOPSY, LYMPH NODE, SENTINEL LEFTINJECTION, FOR SENTINEL NODE IDENTIFICATIONMASTECTOMY, WITH SENTINEL NODE BIOPSY AND AXILLARY LYMPHADENECTOMYRECONSTRUCTION, BREAST, USING JUNAID SKIN FLAP Number of specimens: 4Name of specimens: 1. Left breast. Short stitch superior, long stitch lateral, lopped stitch areolar2. Left axillary sentinel lymph node #1 -  FROZEN3. Left axillary sentinel lymph node #2 - HOT 60 FROZEN4. Right breast. Short stitch superior, long stitch lateral, lopped stitch areolar     References:    Click here for ordering Quick Tip   Question Answer Comment   Procedure Type: Breast    Specimen Class: Known or suspected malignancy    Which provider would you like to cc? DELMIS DAVIS    Release to patient Immediate        02/08/23 1157                    [id number]

## 2023-02-08 NOTE — BRIEF OP NOTE
[facility] Surgery ([place])  Brief Operative Note     SUMMARY     Surgery Date: 2/8/2023     Surgeon(s) and Role:  Panel 1:     * Kim Jiménez MD - Primary  Panel 2:     * Kevin Curry DO - Primary     * Gopal Hoover MD - Assisting    Assistant: Bee Hernandez PA-C, Rafael Butt MD    Pre-op Diagnosis:  Malignant neoplasm of upper-outer quadrant of left female breast, unspecified estrogen receptor status [C50.412]    Post-op Diagnosis:  Post-Op Diagnosis Codes:     * Malignant neoplasm of upper-outer quadrant of left female breast, unspecified estrogen receptor status [C50.412]    Procedure(s) (LRB):  MASTECTOMY, BILATERAL (Bilateral)  BIOPSY, LYMPH NODE, SENTINEL LEFT (Left)  INJECTION, FOR SENTINEL NODE IDENTIFICATION (Left)  MASTECTOMY, WITH SENTINEL NODE BIOPSY AND AXILLARY LYMPHADENECTOMY (Left)  RECONSTRUCTION, BREAST, USING JUNAID SKIN FLAP (Bilateral)    Anesthesia: General    Description of the findings of the procedure: Bilateral mastectomy, left SN biopsy, immediate breast reconstruction with bilateral JUNAID flaps    Findings/Key Components: See operative report    Estimated Blood Loss: 150 mL         Specimens:   Specimen (24h ago, onward)       Start     Ordered    02/08/23 1520  Specimen to Pathology, Surgery Breast  Once        Comments: Pre-op Diagnosis: Malignant neoplasm of upper-outer quadrant of left female breast, unspecified estrogen receptor status [C50.412]Procedure(s):MASTECTOMY, BILATERALBIOPSY, LYMPH NODE, SENTINEL LEFTINJECTION, FOR SENTINEL NODE IDENTIFICATIONMASTECTOMY, WITH SENTINEL NODE BIOPSY AND AXILLARY LYMPHADENECTOMYRECONSTRUCTION, BREAST, USING JUNAID SKIN FLAP Number of specimens: 6Name of specimens: 1. Left Breast, short stitch superior, long lateral, loop areola 2. Left axillary sentinel lymph node hot 260 (FROZEN)3. Left axillary sentinel lymph node hot 60 (FROZEN)4. Right Breast Short stitch superior, long lateral, loop areola 5. Right Breast Skin 6. Left  Breast Skin     References:    Click here for ordering Quick Tip   Question Answer Comment   Procedure Type: Breast    Specimen Class: Known or suspected malignancy    Which provider would you like to cc? MARCIO HATFIELD    Which provider would you like to cc? DELMIS DAVIS    Release to patient Immediate        23 1520    23 1156  Specimen to Pathology, Surgery Breast  Once        Comments: Pre-op Diagnosis: Malignant neoplasm of upper-outer quadrant of left female breast, unspecified estrogen receptor status [C50.412]Procedure(s):MASTECTOMY, BILATERALBIOPSY, LYMPH NODE, SENTINEL LEFTINJECTION, FOR SENTINEL NODE IDENTIFICATIONMASTECTOMY, WITH SENTINEL NODE BIOPSY AND AXILLARY LYMPHADENECTOMYRECONSTRUCTION, BREAST, USING JUNAID SKIN FLAP Number of specimens: 4Name of specimens: 1. Left breast. Short stitch superior, long stitch lateral, lopped stitch areolar2. Left axillary sentinel lymph node #1 -  FROZEN3. Left axillary sentinel lymph node #2 - HOT 60 FROZEN4. Right breast. Short stitch superior, long stitch lateral, lopped stitch areolar     References:    Click here for ordering Quick Tip   Question Answer Comment   Procedure Type: Breast    Specimen Class: Known or suspected malignancy    Which provider would you like to cc? DELMIS DAVIS    Release to patient Immediate        23 1157                    Implants:   Implant Name Type Inv. Item Serial No.  Lot No. LRB No. Used Action    MICROVAS ANSTMS 2.5MM - [id number]   YongCheAS ANSTMS 2.5MM  [employer] [id number] Left 1 Implanted    MICROVAS ANSTMS 2.5MM - [id number]   MICROVAS ANSTMS 2.5MM  [employer] [id number] Left 1 Implanted    2.0MM - [id number]   2.0MM  [employer] [id number] Right 1 Implanted   DEVICE MICROVAS ANSTMTC 1.5MM - [id number]  DEVICE YongCheAS ANSTMTC 1.5MM  [employer] [id number] Right 1  Implanted       Complications: None    Disposition: Admit for flap monitoring

## 2023-02-08 NOTE — ANESTHESIA POSTPROCEDURE EVALUATION
Anesthesia Post Evaluation    Patient: Trinity Morin    Procedure(s) Performed: Procedure(s) (LRB):  MASTECTOMY, BILATERAL (Bilateral)  BIOPSY, LYMPH NODE, SENTINEL LEFT (Left)  INJECTION, FOR SENTINEL NODE IDENTIFICATION (Left)  MASTECTOMY, WITH SENTINEL NODE BIOPSY AND AXILLARY LYMPHADENECTOMY (Left)  RECONSTRUCTION, BREAST, USING JUNAID SKIN FLAP (Bilateral)    Final Anesthesia Type: general      Patient location during evaluation: PACU  Patient participation: Yes- Able to Participate  Level of consciousness: awake and alert  Post-procedure vital signs: reviewed and stable  Pain management: adequate  Airway patency: patent    PONV status at discharge: No PONV  Anesthetic complications: no      Cardiovascular status: blood pressure returned to baseline and stable  Respiratory status: room air  Hydration status: euvolemic  Follow-up not needed.          Vitals Value Taken Time   /58 02/08/23 1613   Temp 36.8 °C (98.2 °F) 02/08/23 1609   Pulse 92 02/08/23 1617   Resp 20 02/08/23 1609   SpO2 100 % 02/08/23 1617   Vitals shown include unvalidated device data.      No case tracking events are documented in the log.      Pain/Gregorio Score: Pain Rating Prior to Med Admin: 0 (2/8/2023  6:17 AM)

## 2023-02-08 NOTE — ANESTHESIA PROCEDURE NOTES
Intubation    Date/Time: 2/8/2023 8:05 AM  Performed by: Madeline Juarez CRNA  Authorized by: Eric Parra MD     Intubation:     Induction:  Intravenous    Intubated:  Postinduction    Mask Ventilation:  Easy mask    Attempts:  1    Attempted By:  CRNA    Method of Intubation:  Direct and video laryngoscopy    Blade:  Nayely 3    Laryngeal View Grade: Grade I - full view of cords      Difficult Airway Encountered?: No      Complications:  None    Airway Device:  Oral endotracheal tube    Airway Device Size:  7.0    Style/Cuff Inflation:  Cuffed    Inflation Amount (mL):  3    Tube secured:  22    Secured at:  The lips    Placement Verified By:  Capnometry    Complicating Factors:  None    Findings Post-Intubation:  BS equal bilateral

## 2023-02-08 NOTE — OR NURSING
Pt's V/S stable on room air while in PACU. PRN pain meds and antiemetics given with good relief of symptoms. Incision sites to breasts and abdomen clean/dry/intact. OSBALDO drains to bulb suction with output as documented in I/O flowsheet. Doppler signals to B/L breast strong. Dr. Curry at bedside in PACU to assess pt, pleased with appearance and doppler signal. Report given to ARTI Turner on 3S. Will transfer to room 378 shortly.

## 2023-02-08 NOTE — TRANSFER OF CARE
"Anesthesia Transfer of Care Note    Patient: Trinity Morin    Procedure(s) Performed: Procedure(s) (LRB):  MASTECTOMY, BILATERAL (Bilateral)  BIOPSY, LYMPH NODE, SENTINEL LEFT (Left)  INJECTION, FOR SENTINEL NODE IDENTIFICATION (Left)  MASTECTOMY, WITH SENTINEL NODE BIOPSY AND AXILLARY LYMPHADENECTOMY (Left)  RECONSTRUCTION, BREAST, USING JUNAID SKIN FLAP (Bilateral)    Patient location: PACU    Anesthesia Type: general    Transport from OR: Transported from OR on 6-10 L/min O2 by face mask with adequate spontaneous ventilation    Post pain: adequate analgesia    Post assessment: no apparent anesthetic complications and tolerated procedure well    Post vital signs: stable    Level of consciousness: awake, alert and oriented    Nausea/Vomiting: no nausea/vomiting    Complications: none    Transfer of care protocol was followed      Last vitals:   Visit Vitals  /73 (BP Location: Left arm, Patient Position: Lying)   Pulse 75   Temp 36.8 °C (98.2 °F) (Temporal)   Resp 20   Ht 4' 11" (1.499 m)   Wt 61.7 kg (136 lb)   SpO2 98%   Breastfeeding No   BMI 27.47 kg/m²     "

## 2023-02-09 LAB
BASOPHILS # BLD AUTO: 0.01 K/UL (ref 0–0.2)
BASOPHILS NFR BLD: 0.1 % (ref 0–1.9)
DIFFERENTIAL METHOD: ABNORMAL
EOSINOPHIL # BLD AUTO: 0 K/UL (ref 0–0.5)
EOSINOPHIL NFR BLD: 0 % (ref 0–8)
ERYTHROCYTE [DISTWIDTH] IN BLOOD BY AUTOMATED COUNT: 13.7 % (ref 11.5–14.5)
HCT VFR BLD AUTO: 30.2 % (ref 37–48.5)
HGB BLD-MCNC: 10.2 G/DL (ref 12–16)
IMM GRANULOCYTES # BLD AUTO: 0.03 K/UL (ref 0–0.04)
IMM GRANULOCYTES NFR BLD AUTO: 0.3 % (ref 0–0.5)
LYMPHOCYTES # BLD AUTO: 2.3 K/UL (ref 1–4.8)
LYMPHOCYTES NFR BLD: 23.6 % (ref 18–48)
MCH RBC QN AUTO: 28 PG (ref 27–31)
MCHC RBC AUTO-ENTMCNC: 33.8 G/DL (ref 32–36)
MCV RBC AUTO: 83 FL (ref 82–98)
MONOCYTES # BLD AUTO: 1 K/UL (ref 0.3–1)
MONOCYTES NFR BLD: 10.1 % (ref 4–15)
NEUTROPHILS # BLD AUTO: 6.3 K/UL (ref 1.8–7.7)
NEUTROPHILS NFR BLD: 65.9 % (ref 38–73)
NRBC BLD-RTO: 0 /100 WBC
PLATELET # BLD AUTO: 220 K/UL (ref 150–450)
PMV BLD AUTO: 10.9 FL (ref 9.2–12.9)
RBC # BLD AUTO: 3.64 M/UL (ref 4–5.4)
WBC # BLD AUTO: 9.58 K/UL (ref 3.9–12.7)

## 2023-02-09 PROCEDURE — 94761 N-INVAS EAR/PLS OXIMETRY MLT: CPT

## 2023-02-09 PROCEDURE — 63600175 PHARM REV CODE 636 W HCPCS: Performed by: STUDENT IN AN ORGANIZED HEALTH CARE EDUCATION/TRAINING PROGRAM

## 2023-02-09 PROCEDURE — 25000003 PHARM REV CODE 250: Performed by: SURGERY

## 2023-02-09 PROCEDURE — 36415 COLL VENOUS BLD VENIPUNCTURE: CPT | Performed by: STUDENT IN AN ORGANIZED HEALTH CARE EDUCATION/TRAINING PROGRAM

## 2023-02-09 PROCEDURE — 94799 UNLISTED PULMONARY SVC/PX: CPT

## 2023-02-09 PROCEDURE — 25000003 PHARM REV CODE 250: Performed by: STUDENT IN AN ORGANIZED HEALTH CARE EDUCATION/TRAINING PROGRAM

## 2023-02-09 PROCEDURE — 11000001 HC ACUTE MED/SURG PRIVATE ROOM

## 2023-02-09 PROCEDURE — 85025 COMPLETE CBC W/AUTO DIFF WBC: CPT | Performed by: STUDENT IN AN ORGANIZED HEALTH CARE EDUCATION/TRAINING PROGRAM

## 2023-02-09 RX ORDER — OXYCODONE HYDROCHLORIDE 5 MG/1
5 TABLET ORAL EVERY 4 HOURS PRN
Status: DISCONTINUED | OUTPATIENT
Start: 2023-02-09 | End: 2023-02-11 | Stop reason: HOSPADM

## 2023-02-09 RX ADMIN — IBUPROFEN 800 MG: 400 TABLET, FILM COATED ORAL at 05:02

## 2023-02-09 RX ADMIN — CEPHALEXIN 500 MG: 500 CAPSULE ORAL at 05:02

## 2023-02-09 RX ADMIN — ACETAMINOPHEN 1000 MG: 500 TABLET, FILM COATED ORAL at 02:02

## 2023-02-09 RX ADMIN — IBUPROFEN 800 MG: 400 TABLET, FILM COATED ORAL at 12:02

## 2023-02-09 RX ADMIN — GABAPENTIN 300 MG: 300 CAPSULE ORAL at 08:02

## 2023-02-09 RX ADMIN — METHOCARBAMOL 500 MG: 500 TABLET ORAL at 06:02

## 2023-02-09 RX ADMIN — ACETAMINOPHEN 1000 MG: 500 TABLET, FILM COATED ORAL at 09:02

## 2023-02-09 RX ADMIN — ENOXAPARIN SODIUM 40 MG: 40 INJECTION SUBCUTANEOUS at 08:02

## 2023-02-09 RX ADMIN — OXYCODONE 5 MG: 5 TABLET ORAL at 06:02

## 2023-02-09 RX ADMIN — GABAPENTIN 300 MG: 300 CAPSULE ORAL at 02:02

## 2023-02-09 RX ADMIN — IBUPROFEN 800 MG: 400 TABLET, FILM COATED ORAL at 06:02

## 2023-02-09 RX ADMIN — DOCUSATE SODIUM 100 MG: 100 CAPSULE, LIQUID FILLED ORAL at 08:02

## 2023-02-09 RX ADMIN — METHOCARBAMOL 500 MG: 500 TABLET ORAL at 12:02

## 2023-02-09 RX ADMIN — CEPHALEXIN 500 MG: 500 CAPSULE ORAL at 12:02

## 2023-02-09 RX ADMIN — METHOCARBAMOL 500 MG: 500 TABLET ORAL at 05:02

## 2023-02-09 RX ADMIN — OXYCODONE 5 MG: 5 TABLET ORAL at 03:02

## 2023-02-09 RX ADMIN — OXYCODONE 5 MG: 5 TABLET ORAL at 08:02

## 2023-02-09 RX ADMIN — CEPHALEXIN 500 MG: 500 CAPSULE ORAL at 06:02

## 2023-02-09 RX ADMIN — ACETAMINOPHEN 1000 MG: 500 TABLET, FILM COATED ORAL at 06:02

## 2023-02-09 RX ADMIN — CEFAZOLIN SODIUM 2 G: 2 SOLUTION INTRAVENOUS at 06:02

## 2023-02-09 NOTE — OP NOTE
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 Curry    SURGEON: Surgeon(s) and Role:  Panel 1:     * Kim Jiménez MD - Primary  Panel 2:     * Kevin Curry DO - Primary     * Gopal Hoover MD - Assisting    ANESTHESIA: General     OPERATIVE FINDINGS: Healthy appearing skin and nipples at the completion of the mastectomies.  Gideon nodes negative on frozen section.    INDICATION FOR PROCEDURE: This patient presents with a history of invasive carcinoma of the left breast and BRCA 2 mutation    PROCEDURE IN DETAIL:  Trinity Morin is a 57 y.o. female brought to the operating room for definitive surgery of invasive carcinoma of the left breast.  The patient has elected to undergo bilateral nipple sparing mastectomy with sentinel lymph node biopsy for adriano assessment. The patient was informed of the possible risks and complications of the procedure, including but not limited to anesthetic risks, bleeding, infection, and need for additional surgery.  The patient concurred with the proposed plan, and has given informed consent.  The site of surgery was properly noted/marked in the preoperative holding area.    The patient was then brought to the operating room and placed in the supine position with both upper extremities extended.  local and general anesthesia was administered. Perioperative antibiotics were administered consisting of Ancef and a time out was performed confirming the patient, site, and procedure.  The patient's left breast was injected with technetium to facilitate sentinel lymph node identification. The bilateral chest and axilla was  then prepped and draped in the usual sterile fashion.    We first turned our attention to the right prophylactic breast where an inframammary incision was made, sparing the nipple areolar complex and including excision of the prior scar.  The incision was made with a plasmablade and deepened through the subcutaneous tissues with plasmablade.  Skin flaps were raised to the clavicle superiorly, to the lateral border of the sternum medially, to the inframammary fold inferiorly, and to the anterior border of the latissimus dorsi muscle laterally. Lighted retractors were used to assist in the creation of the skin flaps. The tissue beneath the nipple areolar complex was sharply dissected being careful to dissect to the dermis.  We were careful to ensure that the ductal tissue beneath the nipple was removed.  The breast tissue was then excised off the chest wall using plasmablade and taking care to incorporate the pectoralis fascia while leaving the serratus fascia behind.  The resulting mastectomy specimen was marked using a short stitch superiorly and a long stitch laterally, looped stitch subareolar.  The breast was sent to pathology for permanent evaluation.   The operative field was irrigated with normal saline and all bleeding points were secured with plasmablade.   The incision will be closed by the plastic surgery service.        We then turned our attention back to the left breast where a matching incision was fashioned sparing the nipple areolar complex.  The incision was made with a plasmablade and extended through the subcutaneous tissues with plasmablade.  Skin flaps were raised to the clavicle superiorly.  We then proceeded to raise the remainder of the flaps to the lateral border of the sternum medially, to the inframammary fold inferiorly, and to the anterior border of the latissimus dorsi muscle laterally.  Lighted retractors were used to assist in the creation of the skin flaps. The tissue beneath the  nipple areolar complex was sharply dissected being careful to dissect to the dermis.  We were careful to ensure that the ductal tissue beneath the nipple was removed. The breast tissue was excised off the chest wall taking care to incorporate the pectoralis fascia while leaving the serratus fascia behind.  The resulting mastectomy specimen was marked using a short stitch superiorly and long stitch laterally, looped stitch subareolar.  The breast was sent to pathology for permanent evaluation.      We then turned our attention to the left axilla.   The gamma probe was used to identify an area of increased radioactivity within the lower axilla.  The clavipectoral sheath was sharply incised to reveal the level I axillary lymph nodes. The probe was used to identify a single node with increased radioactivity.  This node was brought into the operative field and carefully dissected free of the surrounding lymphovascular structures.  The highest ex vivo count of the node was 260.  The node was then sent to pathology for frozen section evaluation, labeled as sentinel node #1.  A total of 2 axillary sentinel nodes and 0 axillary non-sentinel nodes were identified, excised and submitted to pathology.  Bed counts were obtained to confirm that the 10% rule had not been violated.   The wound was irrigated with normal saline, and all bleeding points were secured with cautery.  The incision was closed with an interrupted 3-0 vicryl deep dermal stitch followed by a running 4-0 vicryl subcuticular.     Frozen section adriano evaluation revealed no evidence of metastatic disease.  Therefore, the operative field was irrigated with normal saline and all bleeding points were secured with Bovie electrocautery.  The incision will be closed by the plastic surgery service.      At the end of my portion of the operation, all sponge, instrument, and needle counts x 2 were correct.    ESTIMATED BLOOD LOSS: less than 50 mL    COMPLICATIONS:  none    DISPOSITION: intraop transfer to the plastic surgery service    ATTESTATION:   I was present and scrubbed for the entire procedure.

## 2023-02-09 NOTE — OP NOTE
[facility] ([address])  Plastic Surgery  Operative Note    SUMMARY     Date of Procedure: 2/8/2023     Location:  [facility]    Procedure(s): Procedure(s) (LRB):  MASTECTOMY, BILATERAL (Bilateral)  BIOPSY, LYMPH NODE, SENTINEL LEFT (Left)  INJECTION, FOR SENTINEL NODE IDENTIFICATION (Left)  MASTECTOMY, WITH SENTINEL NODE BIOPSY AND AXILLARY LYMPHADENECTOMY (Left)    JUNAID flap to right breast for immediate breast reconstruction  JUNAID flap to left breast for immediate breast reconstruction  ICG angiography to assess mastectomy skin and abdominal flap perfusion  Bilateral TAP and intercostal rib blocks with Exparel for postoperative pain control    Surgeon(s) and Role:  Panel 1:     * Kim Jiménez MD - Primary  Panel 2:     * Kevin Curry DO - Primary     * Gopal Hoover MD - Assisting    Assistant: ANTWAN Albarado and Rafael Butt MD, Fellow    Pre-Operative Diagnosis: Malignant neoplasm of upper-outer quadrant of left female breast, unspecified estrogen receptor status [C50.412]    Post-Operative Diagnosis: same    Anesthesia: General    Indications for Procedure:  This is a 57-year-old woman with left breast cancer.  She chose to undergo bilateral mastectomies and wished for autologous, JUNAID flap reconstruction.  Of note she is a patient of Dr. Diaz and had a bilateral breast reduction 2 years ago.  Therefore we plan on using her wise pattern scars.  She was a candidate both in terms of nipple position and on the logic conditions for a nipple sparing mastectomy.  We discussed that the patient does not have the same amount of abdominal tissue she does breast tissue, but the patient also expressed that she wanted her breasts to be smaller then they currently were after her breast reduction.    Operative Findings (including complications, if any):  Bilateral JUNAID flaps the bilateral breasts for nipple sparing mastectomy     Left breast weight 966 g  Right breast weight 918 g  Left  abdominal flap to right breast 356 g   Right abdominal flap to left breast 362 g    Description of Procedures:  Patient was seen in the preoperative holding area.  Surgical and photographic consents were previously signed at her preoperative visit.  All questions were answered.  Landmarks were marked on her breast and for her abdominal flaps.  I met with Dr. Jiménez.  We discussed surgical incisions for nipple sparing mastectomy.  Because of her previous wise pattern scars which had now crept up from her IMF and also her larger breast volume than flap volume, we chose for a pical incision of the tissue between her IMF and her horizontal breast reduction scar.      The patient was taken the operating room.  General anesthesia was induced.  Her abdominal flaps were marked as were the location of her dominant perforators.  Final breast incisions were also marked.  Both breasts and the abdomen were prepped and draped in usual sterile fashion.  The arms were prepped out so that they can brought into her sides for micro.    Please see Dr. Esparza's note for her portion of mastectomy and left sentinel lymph node biopsy.  Simultaneously I began the flap elevation she began the mastectomies.      I became began with the elevation of the right abdominal flap.  Incision was made along the lower border of the right hemiabdomen using a knife.  Using the Bovie dissected down in the subcutaneous fat.  The SIEV was identified in the superficial fat on the right side.  Several cm of this were dissected free.  It was of moderate caliber and given the patient's  anatomy that was possible that would be needed for outflow.  It was clipped and divided distally.  The SIV and then protected and we dissected down through the subcutaneous fat to the level of the anterior abdominal wall along the lower border of the right flap.  Next we incised the superior border of the flap.  The flap was pulled downward to bevel underneath the  upper abdominal flap to gain extra tissue with our abdominal flap.  Next the flap was elevated from lateral to medial off of the abdominal wall.      The patient's dominant  based on the preoperative CTA was a large periumbilical .  It was a type 2 branching pattern on that side there was also a lower bifurcating smaller  off the medial branch in the middle of the flap that we hope to take.  In elevating from lateral to medial she would multiple small branches of the lateral row.  There was not 1 dominant lateral .  After these were identified we cut around the umbilicus and made the midline incision between 2 flaps.  I then proceeded to dissect the flap off the anterior abdominal wall from medial to lateral and identified the large periumbilical .  Continuing down I also found the smaller bifurcating perforators.  Next a fascial incision was made from the upper medial to the lower medial perforators.  Next a  dissection was performed tracing the superior  on a long intramuscular course where was later joined by the lower  and finally the main JUNAID pedicle.  This was dissected down the inferiorly until there was adequate length.  Windows were made.  The  dissection was complete a small cuff of fascia was taken around each  off the medial row.  Once this  dissection was complete the small perforators from the lateral row were ligated with clips the flap was isolated on these 2 perforators.  The flap was stapled in place to protect it from shearing off the abdomen.      Next the left flap was elevated.  It also had a dominant periumbilical  and on the skin there was no other significant perforators seen.  We began by incising the lower border of the flap.  The SIEV was seen and several cm of it were also dissected.  It was smaller on this side than on the right. .  After an incision was made  full-thickness we then made the top incision and also beveled to gain additional flap tissue.  The flap was elevated from lateral to medial.  Again there is a few small lateral perforators and 1 moderate sized  about the level of the umbilicus that were identified.  I then dissected from medial to lateral and identified the large periumbilical .  There was no other significant perforators in line with this.  The JUNAID was a type 1 branching pattern on the left side.  The fascia was entered medially and extended downward.   dissection was performed around the medial periumbilical  down to the main pedicle.  Some muscle fibers had to be divided for this this was a partial-thickness intramuscular course.  Once the main pedicle was encountered it was followed in freed inferiorly until we had adequate length windows were then created.  Upon examining the remaining perforators listening then with the pencil Doppler 2nd best  was the more lateral superior .  It was only 2-3 cm away from the medial  on the abdominal wall and had exhibited a type 1 branching pattern this  was also dissected and taken with the flap.  The superior a JUNAID was divided with clips.  The flap was elevated off the anterior abdominal wall and isolated on these 2 perforators.  The flap was also stapled to the anterior abdominal wall.      At the same time that I was finishing the  flap dissection Dr. Browne was present and exposed the internal mammary artery and vein on both sides.  After checking the right breast pocket for hemostasis the pectoralis muscle was incised over the 3rd rib.  The perichondrium was incised and a cartilaginous portion of the 3rd rib was removed.  Next and the posterior perichondrium was incised and lifted away to expose the right ARMAAN and IMV.  These were skeletonized knee small branches were ligated.  Additionally there was a large  perforating vein.  Portion of this was skeletonized but not divided off the flap of the thoughts of using branch of this if needed for our superficial outflow to the flap.      In a similar fashion the vessels on the left side were also prepared.  There was also a large medial chest wall perforating vein that we were able to identify a suitable branch to be used for anastomosis to the SI AV.      After both recipient vessels were prepared in both sides of the chest.  ICG angiography was performed.  4 cc of reconstituted ICG was injected.  Using the Vision sense cyst in the flaps were examined.  There was good immediately filling in both flaps.  The lateral tip of the right flap did have some slow filling in this area was rounded off to be excised.  Similar area was marked on the right side.  Also the mastectomy skin flaps were examined.  There was good filling and viable nipples on both flaps.    The right flap was harvested, irrigated with heparinized saline and weighed.  It was placed on the left chest and rotated 90° so the SIEV was medial.  The vessels were cleaned and the internal mammary vein was divided.  A 2.5  was used for an end-to-end venous anastomosis.  Next the artery was ligated and a double approximating clamp was placed.  An end-to-end anastomosis was performed using interrupted 9 0 nylon sutures.  There was immediate pulsatile flow in the pedicle and flow through the venous anastomosis.  Next flap was repositioned for the SIEV anastomosis.  The vein was somewhat dilated and dark.  Branch of the large perforating vein was identified and divided.  A single green approximating clamp was placed on it.  Single green clamp was also placed on the SIMV and the clip placed on it was cut off.  2.5 mm venous  was also used for this anastomosis.  The flap was then stapled to the IMF placed over the mastectomy skin flap.    The left abdominal flap was harvested in a similar fashion.  It was  irrigated with heparinized saline and weighed.  It was placed up on the right chest and rotated 90°.  In the same fashion anastomosis was performed.  A 2.0 mm  was used for the recipient vein.  The arterial anastomosis was performed with interrupted 9 0 nylon sutures.  Similarly there was immediate pulsatile flow in the pedicle.  A good flow through the vein.  An internal Doppler was placed on the artery distal to the anastomosis.  We also elected to anastomose the superficial vein to the chest wall  vein branch.  The branch was divided and occluded with a single green.  The clip was cut off of the SIEV and the vein was not dilated there was immediate return of dark blood.  A single green approximating clamp was placed here.  A 1.5 mm  was used for the venous anastomosis.    Next the mastectomy skin envelopes were larger than our flaps.  The mastectomy skin was tailor tacked and extra skin was marked to be removed from the horizontal incision.  Triangular skin paddles were designed of the bottom of each flap.  On the left this incorporated the location of the dominant  and it was marked with a stitch.  Both flaps were de-epithelialized.  There was healthy bright red bleeding from both flaps.  The flaps were sutured to the chest wall using 0 Vicryl sutures.  Excess mastectomy flap skin was removed using face-lift scissors.  Bilateral intercostal blocks were performed using a mixture of Exparel, 0.25% Marcaine, and injectable saline.  20 cc was used for each side.  Fifteen Macedonian OSBALDO drains were placed and brought out the anterior axillary line.  The breasts were closed with buried 3-0 Monocryl sutures and a running 3-0 Stratafix.  Lateral dog ears were also excised on both sides.      Simultaneous to the breast closure the abdominal flap was undermined centrally so could be closed.  The fascial defects were closed on both sides using 0 Vicryl to reapproximate some of the rectus  muscle.  Of 0 Prolene horizontal mattress sutures were used to bring the fascia together and this was run with a 0 V lock PDS suture..  Bilateral tap blocks were performed with direct injection of 20 cc on each side of the previously mentioned Exparel mixture.  The bed was flexed until the skin would come together in the lower midline.  The lower abdominal incision was temporarily tacked with staples.  Interrupted 0 Vicryl were placed in the Carolyne's in the midline.  The position of the umbilicus was marked.  Fifteen Croatian OSBALDO drains were placed bilaterally and brought out under the lateral edge of the incision.  The Carolyne's layer was closed with running 0 PDO barbed suture.  The deep dermis was closed with interrupted 2-0 Monocryl and the skin was closed with 3-0 Stratafix.  A small horizontal rectangle of skin was excised over the umbilicus.  I bluntly dissected through the Carolyne's back down the anterior abdominal wall.  The umbilicus was grasped immediately under this and brought through.  The umbilical stalk was trimmed shorter and tacked in place using 4 interrupted 3-0 Monocryl suture.  The umbilicus was then inset with a running 4-0 Prolene suture circumferentially.    The all drains were dressed with Biopatch and Tegaderm.  Tegaderm was placed over the internal Doppler on the right.  The breast incision was dressed with Dermabond.  Prineo tape was placed along the lower abdominal incision.  It was dressed with Aquacel dressings.  The umbilicus was packed with a Xeroform and covered with a sq Mepilex.  The patient was placed in a postoperative bra with Kerlix in the axilla and ABD padding.  Patient was also placed in abdominal binder and padded with ABDs.  Photographs were taken during the case.  The patient tolerated procedure well was taken to the PACU in stable condition.  There was never any concerns about the perfusion to the flaps after anastomosis.    Significant Surgical Tasks Conducted by the  Assistant(s), if Applicable: The presence of an additional attending surgeon functioning as a co-surgeon  was required due to the complexity of the procedure relative to any available residents    Estimated Blood Loss (EBL): 200mL           Implants:   Implant Name Type Inv. Item Serial No.  Lot No. LRB No. Used Action    MICROVAS ANSTMS 2.5MM - [id number]   MICROVAS ANSTMS 2.5MM  [employer] [id number] Left 1 Implanted    MICROVAS ANSTMS 2.5MM - [id number]   MICROVAS ANSTMS 2.5MM  [employer] [id number] Left 1 Implanted    2.0MM - [id number]   2.0MM  [employer] [id number] Right 1 Implanted   DEVICE MICROVAS ANSTMTC 1.5MM - [id number]  DEVICE MICROVAS ANSTMTC 1.5MM  [employer] [id number] Right 1 Implanted       Specimens:   Specimen (24h ago, onward)       Start     Ordered    23 1520  Specimen to Pathology, Surgery Breast  Once        Comments: Pre-op Diagnosis: Malignant neoplasm of upper-outer quadrant of left female breast, unspecified estrogen receptor status [C50.412]Procedure(s):MASTECTOMY, BILATERALBIOPSY, LYMPH NODE, SENTINEL LEFTINJECTION, FOR SENTINEL NODE IDENTIFICATIONMASTECTOMY, WITH SENTINEL NODE BIOPSY AND AXILLARY LYMPHADENECTOMYRECONSTRUCTION, BREAST, USING JUNAID SKIN FLAP Number of specimens: 6Name of specimens: 1. Left Breast, short stitch superior, long lateral, loop areola 2. Left axillary sentinel lymph node hot 260 (FROZEN)3. Left axillary sentinel lymph node hot 60 (FROZEN)4. Right Breast Short stitch superior, long lateral, loop areola 5. Right Breast Skin 6. Left Breast Skin     References:    Click here for ordering Quick Tip   Question Answer Comment   Procedure Type: Breast    Specimen Class: Known or suspected malignancy    Which provider would you like to cc? MARCIO HATFIELD    Which provider would you like to cc? DELMIS DAVIS    Release to patient  Immediate        02/08/23 1520    02/08/23 1156  Specimen to Pathology, Surgery Breast  Once        Comments: Pre-op Diagnosis: Malignant neoplasm of upper-outer quadrant of left female breast, unspecified estrogen receptor status [C50.412]Procedure(s):MASTECTOMY, BILATERALBIOPSY, LYMPH NODE, SENTINEL LEFTINJECTION, FOR SENTINEL NODE IDENTIFICATIONMASTECTOMY, WITH SENTINEL NODE BIOPSY AND AXILLARY LYMPHADENECTOMYRECONSTRUCTION, BREAST, USING JUNAID SKIN FLAP Number of specimens: 4Name of specimens: 1. Left breast. Short stitch superior, long stitch lateral, lopped stitch areolar2. Left axillary sentinel lymph node #1 -  FROZEN3. Left axillary sentinel lymph node #2 - HOT 60 FROZEN4. Right breast. Short stitch superior, long stitch lateral, lopped stitch areolar     References:    Click here for ordering Quick Tip   Question Answer Comment   Procedure Type: Breast    Specimen Class: Known or suspected malignancy    Which provider would you like to cc? DELMIS DAVIS    Release to patient Immediate        02/08/23 1157                            Condition: Good    Disposition: PACU - hemodynamically stable., admitted to the floor    Attestation: I was present and scrubbed for the entire procedure.

## 2023-02-09 NOTE — CARE UPDATE
02/08/23 2020   Patient Assessment/Suction   Level of Consciousness (AVPU) responds to voice   Respiratory Effort Normal;Unlabored   Expansion/Accessory Muscles/Retractions expansion symmetric;no retractions;no use of accessory muscles   All Lung Fields Breath Sounds clear;equal bilaterally   Rhythm/Pattern, Respiratory depth regular;pattern regular;unlabored   Cough Frequency no cough   PRE-TX-O2   Device (Oxygen Therapy) room air   SpO2 95 %   Pulse Oximetry Type Intermittent   $ Pulse Oximetry - Multiple Charge Pulse Oximetry - Multiple   Pulse 91   Resp 18   Incentive Spirometer   $ Incentive Spirometer Charges   (Pt drowsy will attempt instruct in the am on 2/9/23)

## 2023-02-09 NOTE — NURSING
Nurses Note -- 4 Eyes      2/8/2023   10:41 PM      Skin assessed during: Admit      [x] No Pressure Injuries Present    []Prevention Measures Documented      [] Yes- Altered Skin Integrity Present or Discovered   [] LDA Added if Not in Epic (Describe Wound)   [] New Altered Skin Integrity was Present on Admit and Documented in LDA   [] Wound Image Taken    Wound Care Consulted? No    Attending Nurse:  Verónica Arroyo RN     Second RN/Staff Member:  ARTI Da Silva

## 2023-02-09 NOTE — CARE UPDATE
02/09/23 0900   Patient Assessment/Suction   Level of Consciousness (AVPU) alert   Respiratory Effort Normal;Unlabored   Expansion/Accessory Muscles/Retractions no retractions;no use of accessory muscles   All Lung Fields Breath Sounds Anterior:;Posterior:   Rhythm/Pattern, Respiratory depth regular;pattern regular;unlabored;no shortness of breath reported   Cough Frequency infrequent   Cough Type fair   PRE-TX-O2   Device (Oxygen Therapy) room air   SpO2 95 %   Pulse Oximetry Type Intermittent   $ Pulse Oximetry - Multiple Charge Pulse Oximetry - Multiple   Pulse 86   Resp 16   Incentive Spirometer   $ Incentive Spirometer Charges done with encouragement   Incentive Spirometer Predicted Level (mL) 1500   Administration (IS) instruction provided, initial   Number of Repetitions (IS) 10   Level Incentive Spirometer (mL) 1500   Patient Tolerance (IS) good

## 2023-02-09 NOTE — PROGRESS NOTES
Plastic Surgery Progress note    S/p BL NSM with immediate JUNAID reconstruction POD1    No complaints this am pain at 4, no flap concerns overnight    Vitals:    02/09/23 0606   BP:    Pulse:    Resp: 18   Temp:      Temp:  [97.9 °F (36.6 °C)-98.7 °F (37.1 °C)]     NAD, Aox3  RRR, breathing nonlabored  Both flaps with excellent signals, breasts soft, thin serosang drain output, normal skin paddles  Abdominal dressing clean  All drains between 30 and 55 cc      Routine POD1: DC taylor, DC IVF, regular diet  Multimodal pain meds  Continue q4h flap checks  Begin ambulation today  Anticipate DC Friday pm or more likely Saturday morning    Garcia Curry, DO  Plastic and Reconstructive Surgery

## 2023-02-09 NOTE — PLAN OF CARE
Patient AAOX4, VSS, no acute distress noted. Flaps to the bilateral breast a pink, warm to touch and soft. Both internal and external doppler pulses are strong. Low transverse incision is c/d/I. All OSBALDO drains are c/d/I; see flowsheet for output. Mercedes maintained. Pain controlled overnight. Patient was able to rest throughout the night. Safety maintained. No complaints at this time; patient ready to eat.   Problem: Adult Inpatient Plan of Care  Goal: Plan of Care Review  Outcome: Ongoing, Progressing  Goal: Absence of Hospital-Acquired Illness or Injury  Outcome: Ongoing, Progressing  Goal: Optimal Comfort and Wellbeing  Outcome: Ongoing, Progressing     Problem: Infection  Goal: Absence of Infection Signs and Symptoms  Outcome: Ongoing, Progressing     Problem: Fall Injury Risk  Goal: Absence of Fall and Fall-Related Injury  Outcome: Ongoing, Progressing

## 2023-02-09 NOTE — PLAN OF CARE
Pt AAO x 4. Bilateral breast flaps warm, soft. Strong doppler pulses bilateral noted. Ambulating and tolerating diet well. Voiding yellow urine. Pain controlled with pain medications as ordered. OSBALDO drains with small amount of sanguineous drainage noted. Abdominal dsg CDI. Safety maintained. Call light in reach. Pt free from falls or injury. VSS.  Problem: Adult Inpatient Plan of Care  Goal: Plan of Care Review  Outcome: Ongoing, Progressing  Goal: Patient-Specific Goal (Individualized)  Outcome: Ongoing, Progressing  Goal: Absence of Hospital-Acquired Illness or Injury  Outcome: Ongoing, Progressing  Goal: Optimal Comfort and Wellbeing  Outcome: Ongoing, Progressing  Goal: Readiness for Transition of Care  Outcome: Ongoing, Progressing     Problem: Infection  Goal: Absence of Infection Signs and Symptoms  Outcome: Ongoing, Progressing     Problem: Fall Injury Risk  Goal: Absence of Fall and Fall-Related Injury  Outcome: Ongoing, Progressing

## 2023-02-10 PROCEDURE — 94799 UNLISTED PULMONARY SVC/PX: CPT

## 2023-02-10 PROCEDURE — 11000001 HC ACUTE MED/SURG PRIVATE ROOM

## 2023-02-10 PROCEDURE — 63600175 PHARM REV CODE 636 W HCPCS: Performed by: STUDENT IN AN ORGANIZED HEALTH CARE EDUCATION/TRAINING PROGRAM

## 2023-02-10 PROCEDURE — 25000003 PHARM REV CODE 250: Performed by: STUDENT IN AN ORGANIZED HEALTH CARE EDUCATION/TRAINING PROGRAM

## 2023-02-10 PROCEDURE — 25000003 PHARM REV CODE 250: Performed by: SURGERY

## 2023-02-10 RX ADMIN — CEPHALEXIN 500 MG: 500 CAPSULE ORAL at 12:02

## 2023-02-10 RX ADMIN — ACETAMINOPHEN 1000 MG: 500 TABLET, FILM COATED ORAL at 08:02

## 2023-02-10 RX ADMIN — OXYCODONE 5 MG: 5 TABLET ORAL at 04:02

## 2023-02-10 RX ADMIN — ENOXAPARIN SODIUM 40 MG: 40 INJECTION SUBCUTANEOUS at 08:02

## 2023-02-10 RX ADMIN — IBUPROFEN 800 MG: 400 TABLET, FILM COATED ORAL at 05:02

## 2023-02-10 RX ADMIN — METHOCARBAMOL 500 MG: 500 TABLET ORAL at 11:02

## 2023-02-10 RX ADMIN — CEPHALEXIN 500 MG: 500 CAPSULE ORAL at 05:02

## 2023-02-10 RX ADMIN — METHOCARBAMOL 500 MG: 500 TABLET ORAL at 05:02

## 2023-02-10 RX ADMIN — CEPHALEXIN 500 MG: 500 CAPSULE ORAL at 11:02

## 2023-02-10 RX ADMIN — IBUPROFEN 800 MG: 400 TABLET, FILM COATED ORAL at 11:02

## 2023-02-10 RX ADMIN — ACETAMINOPHEN 1000 MG: 500 TABLET, FILM COATED ORAL at 05:02

## 2023-02-10 RX ADMIN — ACETAMINOPHEN 1000 MG: 500 TABLET, FILM COATED ORAL at 02:02

## 2023-02-10 RX ADMIN — DOCUSATE SODIUM 100 MG: 100 CAPSULE, LIQUID FILLED ORAL at 08:02

## 2023-02-10 RX ADMIN — METHOCARBAMOL 500 MG: 500 TABLET ORAL at 12:02

## 2023-02-10 RX ADMIN — OXYCODONE 5 MG: 5 TABLET ORAL at 08:02

## 2023-02-10 RX ADMIN — GABAPENTIN 300 MG: 300 CAPSULE ORAL at 08:02

## 2023-02-10 RX ADMIN — IBUPROFEN 800 MG: 400 TABLET, FILM COATED ORAL at 12:02

## 2023-02-10 RX ADMIN — GABAPENTIN 300 MG: 300 CAPSULE ORAL at 02:02

## 2023-02-10 RX ADMIN — OXYCODONE 5 MG: 5 TABLET ORAL at 01:02

## 2023-02-10 RX ADMIN — OXYCODONE 5 MG: 5 TABLET ORAL at 12:02

## 2023-02-10 NOTE — PLAN OF CARE
Pt AAO x 4. Pt ambulated in bojor[place]. Independent with ADL's. Bilateral breast soft and warm. OSBALDO drains with small amount of sanguineous drainage noted. Abdominal dsg CDI.  Strong doppler pulses noted. Pt tolerating diet well. Voiding yellow urine. Pain controlled with pain medications as ordered. Safety maintained. Call light in reach. Pt showered today. Tolerated well. VSS.  Problem: Adult Inpatient Plan of Care  Goal: Plan of Care Review  Outcome: Ongoing, Progressing  Goal: Patient-Specific Goal (Individualized)  Outcome: Ongoing, Progressing  Goal: Absence of Hospital-Acquired Illness or Injury  Outcome: Ongoing, Progressing  Goal: Optimal Comfort and Wellbeing  Outcome: Ongoing, Progressing  Goal: Readiness for Transition of Care  Outcome: Ongoing, Progressing     Problem: Infection  Goal: Absence of Infection Signs and Symptoms  Outcome: Ongoing, Progressing     Problem: Fall Injury Risk  Goal: Absence of Fall and Fall-Related Injury  Outcome: Ongoing, Progressing     Problem: Adjustment to Surgery (Breast Surgery)  Goal: Optimal Coping with Surgery  Outcome: Ongoing, Progressing     Problem: Bleeding (Breast Surgery)  Goal: Absence of Bleeding  Outcome: Ongoing, Progressing     Problem: Bowel Motility Impaired (Breast Surgery)  Goal: Effective Bowel Elimination  Outcome: Ongoing, Progressing     Problem: Fluid and Electrolyte Imbalance (Breast Surgery)  Goal: Fluid and Electrolyte Balance  Outcome: Ongoing, Progressing     Problem: Infection (Breast Surgery)  Goal: Absence of Infection Signs and Symptoms  Outcome: Ongoing, Progressing     Problem: Ongoing Anesthesia Effects (Breast Surgery)  Goal: Anesthesia/Sedation Recovery  Outcome: Ongoing, Progressing     Problem: Pain (Breast Surgery)  Goal: Acceptable Pain Control  Outcome: Ongoing, Progressing     Problem: Postoperative Nausea and Vomiting (Breast Surgery)  Goal: Nausea and Vomiting Relief  Outcome: Ongoing, Progressing     Problem: Postoperative Urinary  Retention (Breast Surgery)  Goal: Effective Urinary Elimination  Outcome: Ongoing, Progressing     Problem: Respiratory Compromise (Breast Surgery)  Goal: Effective Oxygenation and Ventilation  Outcome: Ongoing, Progressing

## 2023-02-10 NOTE — PLAN OF CARE
AAOX4. VSS. Flap checks q4. Bilateral breast incisions CDI with dermabond, abd pads secured with surgical bra. Minimal to moderate tenderness noted. Abdominal incisions CDI with surgical dressings secured with abdominal binder, OSBALDO drains X4 producing serosanguineous fluid moderately with no complications noted. Patient encouraged to turn, cough, deep breathe, use IS and splint with pillow. Ambulating well with no issued noted. Voiding clear yellow urine with no issues noted. Able to make needs known. Tolerating diet and medications well. HOB 30-45 degrees with call bell and bedside table within reach, bed in lowest position with hourly purposeful rounding.  at bedside, will continue to monitor.   Problem: Adult Inpatient Plan of Care  Goal: Plan of Care Review  2/10/2023 0308 by Mart Abraham LPN  Outcome: Ongoing, Progressing  2/10/2023 0307 by Mart Abraham LPN  Outcome: Ongoing, Progressing  Goal: Patient-Specific Goal (Individualized)  2/10/2023 0308 by Mart Abraham LPN  Outcome: Ongoing, Progressing  2/10/2023 0307 by Mart Abraham LPN  Outcome: Ongoing, Progressing  Goal: Absence of Hospital-Acquired Illness or Injury  2/10/2023 0308 by Mart Abraham LPN  Outcome: Ongoing, Progressing  2/10/2023 0307 by Mart Abraham LPN  Outcome: Ongoing, Progressing  Goal: Optimal Comfort and Wellbeing  2/10/2023 0308 by Mart Abraham LPN  Outcome: Ongoing, Progressing  2/10/2023 0307 by Mart Abraham LPN  Outcome: Ongoing, Progressing  Goal: Readiness for Transition of Care  2/10/2023 0308 by Mart Abraham LPN  Outcome: Ongoing, Progressing  2/10/2023 0307 by Mart Abraham LPN  Outcome: Ongoing, Progressing     Problem: Infection  Goal: Absence of Infection Signs and Symptoms  2/10/2023 0308 by Mart Abraham LPN  Outcome: Ongoing, Progressing  2/10/2023 0307 by Mart Abraham LPN  Outcome: Ongoing, Progressing     Problem: Fall Injury Risk  Goal: Absence of Fall and Fall-Related  Injury  2/10/2023 0308 by Mart Abraham LPN  Outcome: Ongoing, Progressing  2/10/2023 0307 by Mart Abraham LPN  Outcome: Ongoing, Progressing     Problem: Adjustment to Surgery (Breast Surgery)  Goal: Optimal Coping with Surgery  Outcome: Ongoing, Progressing     Problem: Bleeding (Breast Surgery)  Goal: Absence of Bleeding  Outcome: Ongoing, Progressing     Problem: Bowel Motility Impaired (Breast Surgery)  Goal: Effective Bowel Elimination  Outcome: Ongoing, Progressing     Problem: Fluid and Electrolyte Imbalance (Breast Surgery)  Goal: Fluid and Electrolyte Balance  Outcome: Ongoing, Progressing     Problem: Infection (Breast Surgery)  Goal: Absence of Infection Signs and Symptoms  Outcome: Ongoing, Progressing     Problem: Ongoing Anesthesia Effects (Breast Surgery)  Goal: Anesthesia/Sedation Recovery  Outcome: Ongoing, Progressing     Problem: Pain (Breast Surgery)  Goal: Acceptable Pain Control  Outcome: Ongoing, Progressing     Problem: Postoperative Nausea and Vomiting (Breast Surgery)  Goal: Nausea and Vomiting Relief  Outcome: Ongoing, Progressing     Problem: Postoperative Urinary Retention (Breast Surgery)  Goal: Effective Urinary Elimination  Outcome: Ongoing, Progressing     Problem: Respiratory Compromise (Breast Surgery)  Goal: Effective Oxygenation and Ventilation  Outcome: Ongoing, Progressing

## 2023-02-10 NOTE — PROGRESS NOTES
Plastic Surgery Progress note    S/p BL NSM with immediate JUNAID reconstruction POD2     No complaints this am pain at 2, no flap concerns overnight    Vitals:    02/10/23 0830   BP:    Pulse:    Resp: 16   Temp:      Temp:  [96.4 °F (35.8 °C)-98.4 °F (36.9 °C)]     NAD, Aox3  RRR, breathing nonlabored  Both flaps with excellent signals, breasts soft, thin serosanguinous drain output, normal skin paddles  Abdominal dressing clean  All drains between 30 and 55 cc    Routine POD2:   Regular diet  Multimodal pain meds  Continue q4h flap checks  Continue ambulation, activity as tolerated  Ok to shower today once internal doppler wire is cut  Anticipate DC tomorrow morning    Bee Hernandez PA-C  Plastic and Reconstructive Surgery

## 2023-02-11 VITALS
DIASTOLIC BLOOD PRESSURE: 78 MMHG | BODY MASS INDEX: 27.42 KG/M2 | RESPIRATION RATE: 16 BRPM | HEART RATE: 80 BPM | OXYGEN SATURATION: 97 % | WEIGHT: 136 LBS | HEIGHT: 59 IN | SYSTOLIC BLOOD PRESSURE: 132 MMHG | TEMPERATURE: 98 F

## 2023-02-11 PROCEDURE — 63600175 PHARM REV CODE 636 W HCPCS: Performed by: STUDENT IN AN ORGANIZED HEALTH CARE EDUCATION/TRAINING PROGRAM

## 2023-02-11 PROCEDURE — 25000003 PHARM REV CODE 250: Performed by: STUDENT IN AN ORGANIZED HEALTH CARE EDUCATION/TRAINING PROGRAM

## 2023-02-11 RX ORDER — IBUPROFEN 800 MG/1
800 TABLET ORAL EVERY 6 HOURS
Qty: 28 TABLET | Refills: 0
Start: 2023-02-11 | End: 2023-02-18

## 2023-02-11 RX ORDER — CEPHALEXIN 500 MG/1
500 CAPSULE ORAL EVERY 12 HOURS
Qty: 14 CAPSULE | Refills: 0 | Status: SHIPPED | OUTPATIENT
Start: 2023-02-11 | End: 2023-02-18

## 2023-02-11 RX ORDER — ACETAMINOPHEN 500 MG
1000 TABLET ORAL EVERY 8 HOURS
Qty: 42 TABLET | Refills: 0
Start: 2023-02-11 | End: 2023-02-18

## 2023-02-11 RX ORDER — CEPHALEXIN 500 MG/1
500 CAPSULE ORAL EVERY 12 HOURS
Qty: 14 CAPSULE | Refills: 0 | Status: SHIPPED | OUTPATIENT
Start: 2023-02-11 | End: 2023-02-11 | Stop reason: SDUPTHER

## 2023-02-11 RX ADMIN — ENOXAPARIN SODIUM 40 MG: 40 INJECTION SUBCUTANEOUS at 08:02

## 2023-02-11 RX ADMIN — CEPHALEXIN 500 MG: 500 CAPSULE ORAL at 12:02

## 2023-02-11 RX ADMIN — METHOCARBAMOL 500 MG: 500 TABLET ORAL at 12:02

## 2023-02-11 RX ADMIN — IBUPROFEN 800 MG: 400 TABLET, FILM COATED ORAL at 05:02

## 2023-02-11 RX ADMIN — GABAPENTIN 300 MG: 300 CAPSULE ORAL at 08:02

## 2023-02-11 RX ADMIN — IBUPROFEN 800 MG: 400 TABLET, FILM COATED ORAL at 12:02

## 2023-02-11 RX ADMIN — ONDANSETRON 4 MG: 2 INJECTION INTRAMUSCULAR; INTRAVENOUS at 08:02

## 2023-02-11 RX ADMIN — METHOCARBAMOL 500 MG: 500 TABLET ORAL at 05:02

## 2023-02-11 RX ADMIN — CEPHALEXIN 500 MG: 500 CAPSULE ORAL at 05:02

## 2023-02-11 RX ADMIN — ONDANSETRON 4 MG: 2 INJECTION INTRAMUSCULAR; INTRAVENOUS at 02:02

## 2023-02-11 RX ADMIN — DOCUSATE SODIUM 100 MG: 100 CAPSULE, LIQUID FILLED ORAL at 08:02

## 2023-02-11 RX ADMIN — ACETAMINOPHEN 1000 MG: 500 TABLET, FILM COATED ORAL at 05:02

## 2023-02-11 NOTE — PLAN OF CARE
02/11/23 0814   Final Note   Assessment Type Final Discharge Note   Anticipated Discharge Disposition Home-Health   Post-Acute Status   Post-Acute Authorization Home Health   Home Health Status Referrals Sent   Patient choice form signed by patient/caregiver List with quality metrics by geographic area provided   Discharge Delays None known at this time     Patient will discharge home with home heatlh.     Patient will be transported home by family.     All discharge needs have been addressed.     Per Rafael Butt MD the patient will not need home health.     SW updated patient of the discharge dispo.

## 2023-02-11 NOTE — PLAN OF CARE
02/10/23 1827   Discharge Assessment   Assessment Type Discharge Planning Assessment   Confirmed/corrected address, phone number and insurance Yes   Confirmed Demographics Correct on Facesheet   Source of Information family;patient   Communicated CAITLIN with patient/caregiver Yes   Reason For Admission breast cancer surgery   People in Home spouse   Do you expect to return to your current living situation? Yes   Do you have help at home or someone to help you manage your care at home? Yes   Who are your caregiver(s) and their phone number(s)? roxi garnica (Relative)   [phone] /  spouse   Prior to hospitilization cognitive status: Alert/Oriented   Current cognitive status: Alert/Oriented   Equipment Currently Used at Home none   Readmission within 30 days? No   Patient currently being followed by outpatient case management? No   Do you currently have service(s) that help you manage your care at home? No   Do you take prescription medications? Yes   Do you have prescription coverage? Yes   Coverage [facility] [employer]   Do you have any problems affording any of your prescribed medications? No   Is the patient taking medications as prescribed? yes   Who is going to help you get home at discharge? roxi garnica (Relative)   [phone]   How do you get to doctors appointments? family or friend will provide;car, drives self   Are you on dialysis? No   Do you take coumadin? No   Discharge Plan A Home Health;Home with family   DME Needed Upon Discharge  none   Discharge Plan discussed with: Spouse/sig other;Patient   Discharge Barriers Identified Bariatric   Financial Resource Strain   How hard is it for you to pay for the very basics like food, housing, medical care, and heating? Not hard   Housing Stability   In the last 12 months, was there a time when you were not able to pay the mortgage or rent on time? N   In the last 12 months, how many places have you lived? 1   In the last 12 months, was  there a time when you did not have a steady place to sleep or slept in a shelter (including now)? N   Transportation Needs   In the past 12 months, has lack of transportation kept you from medical appointments or from getting medications? no   In the past 12 months, has lack of transportation kept you from meetings, work, or from getting things needed for daily living? No   Food Insecurity   Within the past 12 months, you worried that your food would run out before you got the money to buy more. Never true   Within the past 12 months, the food you bought just didn't last and you didn't have money to get more. Never true   Stress   Do you feel stress - tense, restless, nervous, or anxious, or unable to sleep at night because your mind is troubled all the time - these days? Not at all   Social Connections   In a typical week, how many times do you talk on the phone with family, friends, or neighbors? More than 3   How often do you get together with friends or relatives? More than 3   Are you , , , , never , or living with a partner? Never marrie   Alcohol Use   Q1: How often do you have a drink containing alcohol? 2-4 pr month   Q2: How many drinks containing alcohol do you have on a typical day when you are drinking? 1 or 2   Q3: How often do you have six or more drinks on one occasion? Never   OTHER   Name(s) of People in Home roxi garnica (Relative)   [phone]

## 2023-02-11 NOTE — DISCHARGE SUMMARY
[facility] ([address])  Discharge Note  Short Stay    Procedure(s) (LRB):  MASTECTOMY, BILATERAL (Bilateral)  BIOPSY, LYMPH NODE, SENTINEL (Left)  INJECTION, FOR SENTINEL NODE IDENTIFICATION (Left)  MASTECTOMY, WITH SENTINEL NODE BIOPSY AND AXILLARY LYMPHADENECTOMY (Left)  RECONSTRUCTION, BREAST, USING JUNAID SKIN FLAP (Bilateral)      OUTCOME: Patient tolerated treatment/procedure well without complication and is now ready for discharge.    DISPOSITION: Home or Self Care    FINAL DIAGNOSIS:  Malignant neoplasm of upper-outer quadrant of left breast in female, estrogen receptor negative    FOLLOWUP: In clinic    DISCHARGE INSTRUCTIONS:  No discharge procedures on file.     TIME SPENT ON DISCHARGE: 5 minutes

## 2023-02-11 NOTE — PLAN OF CARE
AAOX4. VSS. Ambulating well with no deficits noted, able to make needs known. Tolerating diet and meds well. HOB 30-45 degrees with call bell and bedside table within reach, bed in lowest position with hourly purposeful rounding. Resting well with no distress noted, will continue to monitor.   Problem: Adult Inpatient Plan of Care  Goal: Plan of Care Review  Outcome: Ongoing, Progressing  Goal: Patient-Specific Goal (Individualized)  Outcome: Ongoing, Progressing  Goal: Absence of Hospital-Acquired Illness or Injury  Outcome: Ongoing, Progressing  Goal: Optimal Comfort and Wellbeing  Outcome: Ongoing, Progressing  Goal: Readiness for Transition of Care  Outcome: Ongoing, Progressing     Problem: Infection  Goal: Absence of Infection Signs and Symptoms  Outcome: Ongoing, Progressing     Problem: Fall Injury Risk  Goal: Absence of Fall and Fall-Related Injury  Outcome: Ongoing, Progressing     Problem: Adjustment to Surgery (Breast Surgery)  Goal: Optimal Coping with Surgery  Outcome: Ongoing, Progressing     Problem: Bleeding (Breast Surgery)  Goal: Absence of Bleeding  Outcome: Ongoing, Progressing     Problem: Bowel Motility Impaired (Breast Surgery)  Goal: Effective Bowel Elimination  Outcome: Ongoing, Progressing     Problem: Fluid and Electrolyte Imbalance (Breast Surgery)  Goal: Fluid and Electrolyte Balance  Outcome: Ongoing, Progressing     Problem: Infection (Breast Surgery)  Goal: Absence of Infection Signs and Symptoms  Outcome: Ongoing, Progressing     Problem: Ongoing Anesthesia Effects (Breast Surgery)  Goal: Anesthesia/Sedation Recovery  Outcome: Ongoing, Progressing     Problem: Pain (Breast Surgery)  Goal: Acceptable Pain Control  Outcome: Ongoing, Progressing     Problem: Postoperative Nausea and Vomiting (Breast Surgery)  Goal: Nausea and Vomiting Relief  Outcome: Ongoing, Progressing     Problem: Postoperative Urinary Retention (Breast Surgery)  Goal: Effective Urinary Elimination  Outcome:  Ongoing, Progressing     Problem: Respiratory Compromise (Breast Surgery)  Goal: Effective Oxygenation and Ventilation  Outcome: Ongoing, Progressing

## 2023-02-11 NOTE — NURSING
02/11/2023        Pt verbalized understanding D/C instructions and education provided pertinent to D/C needs. IV cath removed fully intact. No distress noted. Pt awaiting transport for discharge.          Shira Aguilera RN

## 2023-02-11 NOTE — PROGRESS NOTES
Plastic and Reconstructive Surgery   Progress Note    Subjective:  no acute events overnight, ambulating, flaps healthy and viable, pain controlled       Objective:  Vital signs in last 24 hours:  Temp:  [98 °F (36.7 °C)-99.2 °F (37.3 °C)] 98.1 °F (36.7 °C)  Pulse:  [] 103  Resp:  [14-20] 20  SpO2:  [93 %-100 %] 100 %  BP: (119-169)/(66-81) 119/67    Intake/Output last 3 shifts:  I/O last 3 completed shifts:  In: 720 [P.O.:720]  Out: 1800 [Urine:1650; Drains:150]    Intake/Output this shift:  No intake/output data recorded.        Physical Exam:  VITAL SIGNS:   Vitals:    02/10/23 1632 02/10/23 1925 02/10/23 2308 23 0356   BP: 139/73 134/66 (!) 144/67 119/67   BP Location: Right arm Right leg Right leg Right leg   Patient Position: Lying Lying Lying Lying   Pulse: 78 76 86 103   Resp: 14 20 20    Temp: 98.3 °F (36.8 °C) 98.2 °F (36.8 °C) 99.2 °F (37.3 °C) 98.1 °F (36.7 °C)   TempSrc: Oral Oral Oral Oral   SpO2: (!) 93% 95% 99% 100%   Weight:       Height:         TMAX: Temp (24hrs), Av.4 °F (36.9 °C), Min:98 °F (36.7 °C), Max:99.2 °F (37.3 °C)    General: Alert; No acute distress  Cardiovascular: Regular rate   Respiratory: Normal respiratory effort. Chest rise symmetric.   Abdomen: Soft, nontender, nondistended  Extremity: Moves all extremities equally.  Neurologic: No focal deficit. Speech normal    Bilateral breasts s/p mastectomy with JUNAID flaps, flaps viable with good color and no sign of congestion, OSBALDO drains SS bilateral  Abdominal incisions clean dressings and binder in place, OSBALDO drains SS       Scheduled Medications acetaminophen, 1,000 mg, Q8H  cephALEXin, 500 mg, Q6H  docusate sodium, 100 mg, Q12H  enoxaparin, 40 mg, Daily  gabapentin, 300 mg, TID  ibuprofen, 800 mg, Q6H  LIDOcaine (PF) 10 mg/ml (1%), 1 mL, Once  methocarbamoL, 500 mg, Q6H      PRN Medications albuterol, HYDROmorphone, mupirocin, ondansetron, oxyCODONE, prochlorperazine, sodium chloride 0.9%    Recent Labs:   Lab Results    Component Value Date    WBC 9.58 02/09/2023    HGB 10.2 (L) 02/09/2023    HCT 30.2 (L) 02/09/2023    MCV 83 02/09/2023     02/09/2023     Lab Results   Component Value Date    GLU 97 01/20/2023     01/20/2023    K 4.2 01/20/2023     01/20/2023    BUN 15 01/20/2023         Assessment: 57 y.o. y/o female 3 Days Post-Op s/p Procedure(s):  MASTECTOMY, BILATERAL  BIOPSY, LYMPH NODE, SENTINEL  INJECTION, FOR SENTINEL NODE IDENTIFICATION  MASTECTOMY, WITH SENTINEL NODE BIOPSY AND AXILLARY LYMPHADENECTOMY  RECONSTRUCTION, BREAST, USING JUNAID SKIN FLAP Doing well postoperatively.    Plan  Dc home today  Keep OSBALDO drains in place and monitor output  Pain control PRN  Ambulate as toelrated  Regular diet       Rafael Butt MD- Fellow  Department of Plastic and Reconstructive Surgery  [phone] (office)

## 2023-02-14 ENCOUNTER — PATIENT MESSAGE (OUTPATIENT)
Dept: PLASTIC SURGERY | Facility: CLINIC | Age: 58
End: 2023-02-14
Payer: OTHER GOVERNMENT

## 2023-02-14 ENCOUNTER — TELEPHONE (OUTPATIENT)
Dept: PLASTIC SURGERY | Facility: CLINIC | Age: 58
End: 2023-02-14
Payer: OTHER GOVERNMENT

## 2023-02-14 DIAGNOSIS — C50.412 MALIGNANT NEOPLASM OF UPPER-OUTER QUADRANT OF LEFT BREAST IN FEMALE, ESTROGEN RECEPTOR NEGATIVE: Primary | ICD-10-CM

## 2023-02-14 DIAGNOSIS — Z17.1 MALIGNANT NEOPLASM OF UPPER-OUTER QUADRANT OF LEFT BREAST IN FEMALE, ESTROGEN RECEPTOR NEGATIVE: Primary | ICD-10-CM

## 2023-02-14 DIAGNOSIS — Z15.01 BRCA GENE MUTATION POSITIVE: ICD-10-CM

## 2023-02-14 DIAGNOSIS — Z15.09 BRCA GENE MUTATION POSITIVE: ICD-10-CM

## 2023-02-14 NOTE — TELEPHONE ENCOUNTER
Attempted to contact pt to discuss rescheduling appt as per pts request. Pt was not available at the time of the call. I left a detailed VM asking pt to please call office at her convenience to discuss.Call Back number given

## 2023-02-14 NOTE — TELEPHONE ENCOUNTER
Pt called back - pt decided to keep her scheduled appt on Thursday, 02/16. Pt frustrated and voiced discontent after being discharged on Saturday -[facility] ; advised pt would speak to Dr Curry to obtain order. No additional concerns at this time

## 2023-02-15 LAB
FINAL PATHOLOGIC DIAGNOSIS: NORMAL
FROZEN SECTION DIAGNOSIS: NORMAL
FROZEN SECTION FOOTNOTE: NORMAL
GROSS: NORMAL
Lab: NORMAL
MICROSCOPIC EXAM: NORMAL

## 2023-02-16 ENCOUNTER — OFFICE VISIT (OUTPATIENT)
Dept: PLASTIC SURGERY | Facility: CLINIC | Age: 58
End: 2023-02-16
Payer: OTHER GOVERNMENT

## 2023-02-16 ENCOUNTER — TELEPHONE (OUTPATIENT)
Dept: PLASTIC SURGERY | Facility: CLINIC | Age: 58
End: 2023-02-16
Payer: OTHER GOVERNMENT

## 2023-02-16 VITALS
WEIGHT: 144.19 LBS | OXYGEN SATURATION: 99 % | BODY MASS INDEX: 29.07 KG/M2 | HEIGHT: 59 IN | HEART RATE: 93 BPM | DIASTOLIC BLOOD PRESSURE: 67 MMHG | SYSTOLIC BLOOD PRESSURE: 126 MMHG

## 2023-02-16 DIAGNOSIS — C50.412 MALIGNANT NEOPLASM OF UPPER-OUTER QUADRANT OF LEFT BREAST IN FEMALE, ESTROGEN RECEPTOR NEGATIVE: Primary | ICD-10-CM

## 2023-02-16 DIAGNOSIS — Z17.1 MALIGNANT NEOPLASM OF UPPER-OUTER QUADRANT OF LEFT BREAST IN FEMALE, ESTROGEN RECEPTOR NEGATIVE: Primary | ICD-10-CM

## 2023-02-16 PROCEDURE — 99024 POSTOP FOLLOW-UP VISIT: CPT | Mod: ,,, | Performed by: SURGERY

## 2023-02-16 PROCEDURE — 99024 PR POST-OP FOLLOW-UP VISIT: ICD-10-PCS | Mod: ,,, | Performed by: SURGERY

## 2023-02-16 PROCEDURE — 99999 PR PBB SHADOW E&M-EST. PATIENT-LVL III: ICD-10-PCS | Mod: PBBFAC,,, | Performed by: SURGERY

## 2023-02-16 PROCEDURE — 99999 PR PBB SHADOW E&M-EST. PATIENT-LVL III: CPT | Mod: PBBFAC,,, | Performed by: SURGERY

## 2023-02-16 PROCEDURE — 99213 OFFICE O/P EST LOW 20 MIN: CPT | Mod: PBBFAC | Performed by: SURGERY

## 2023-02-16 NOTE — TELEPHONE ENCOUNTER
Called pt back - Advised pt she can resume driving as long as she is no longer taking narcotics and feel comfortable with mobility to drive- pt voiced understanding- no additional concerns at this time.            ----- Message from Josefina Guzman sent at 2/16/2023  4:26 PM CST -----  Regarding: Pt Adv  Contact: [phone]  Pt calling to see when will she be able to drive. Pt isnt taking the oxycodone .  Please call and adv @ [phone]

## 2023-02-16 NOTE — PROGRESS NOTES
Trinity Morin presents to Plastic Surgery Clinic for a follow up visit status post bilateral mastectomy with immediately JUNAID flap reconstruction on 2/8/23. She is doing well today with no issues since discharge. She is tolerating a normal diet and pain is well controlled. Her 4 OSBALDO drains are putting out serosanguinous drainage. She denies fever, chills, nausea, vomiting or other systemic signs of infection.       ROS - negative, other than stated above    PHYSICAL EXAMINATION  Vitals:    02/16/23 1537   BP: 126/67   Pulse: 93     WD WN NAD  VSS  Normal respiratory effort  R breast - incision CDI, no erythema or drainage, nipple viable, OSBALDO drain in place, skin paddle healthy in appearance, bruising noted to mastectomy skin flaps  L breast - incision CDI, no erythema or drainage, nipple viable, OSBALDO drain in place, skin paddle healthy in appearance, bruising noted to mastectomy skin flaps  Abdominal incision CDI, no erythema or drainage, bilateral OSBALDO drains in place    ASSESSMENT/PLAN  57 y.o. F s/p bilateral mastectomy with immediate JUNAID flap reconstruction  - Doing well, no issues.   - Flaps are healing well, soft, nipples are viable  - Bilateral breast and left abdominal OSBALDO drains removed, right abdominal OSBALDO drain remains in place  - RTC x 1 week(s), appointment scheduled.      All questions were answered. The patient was advised to contact the clinic with any questions or concerns prior to their next visit.       Bee Hernandez PA-C  Plastic and Reconstructive Surgery

## 2023-02-17 ENCOUNTER — PATIENT MESSAGE (OUTPATIENT)
Dept: SURGERY | Facility: CLINIC | Age: 58
End: 2023-02-17
Payer: OTHER GOVERNMENT

## 2023-02-17 ENCOUNTER — TELEPHONE (OUTPATIENT)
Dept: PLASTIC SURGERY | Facility: CLINIC | Age: 58
End: 2023-02-17
Payer: OTHER GOVERNMENT

## 2023-02-17 NOTE — TELEPHONE ENCOUNTER
Spoke with Pt and aware of appt scheduled with Dr Curry at 1pm 02.23 [facility] - Pt thought appt was scheduled at the same time to see breast surgeon and clear that appt is 11;30-  No additional concerns at this time.

## 2023-02-20 ENCOUNTER — PATIENT MESSAGE (OUTPATIENT)
Dept: PLASTIC SURGERY | Facility: CLINIC | Age: 58
End: 2023-02-20
Payer: OTHER GOVERNMENT

## 2023-02-22 NOTE — PROGRESS NOTES
Subjective:       Patient ID: Trinity Morin is a 57 y.o. female.    Chief Complaint: No chief complaint on file.    HPI 57-year-old female seen for follow-up of triple negative left breast cancer.      Breast Cancer History:  She was diagnosed  BRCA2 positive in 2013 and had been under active surveillance.      [facility]  Referrred by Dr. Duncan at [facility].    MRI of the breast on May 27, 2022 showed a 6 x 7 mm irregular enhancing mass in the upper-outer quadrant of the left breast.      On June 22, 2020 22 follow-up ultrasound showed a 4 x 4 x 6 mm oval hypoechoic mass in the left breast at 3:00 a.m. position corresponding to the MRI findings.      On November 9, 2022 a follow-up mammogram and ultrasound were performed.  That imaging showed an irregular mass in the upper-outer quadrant of the left breast which by ultrasound had enlarged now measuring 6 x 10 x 6 mm.  There were no abnormal axillary lymph nodes noted.    The right breast showed some new calcifications measuring 4 mm in the upper-outer quadrant with focal asymmetry.      On November 17, 2022 biopsy of the right breast and left breast was performed.  The right breast biopsy was benign.    Left breast biopsy showed high-grade infiltrating ductal carcinoma (histologic grade 3, nuclear grade 3, mitotic index 3).    Tumor cells were estrogen receptor negative, progesterone receptor negative, and HER2 negative by FISH, Ki-67 was 78.5%    On February 8, 2023 she underwent bilateral mastectomy with JUNAID flap reconstruction.  The left breast pathology showed a 9 mm high-grade infiltrating carcinoma with 2 negative sentinel lymph nodes (T1b N0).  Stage 1B  The right breast showed no evidence of malignancy or atypia.      Today she reports that she is recovering well.      Review of Systems   Constitutional:  Negative for appetite change and unexpected weight change.   HENT:  Negative for mouth sores.    Eyes:  Negative for visual  disturbance.   Respiratory:  Negative for cough and shortness of breath.    Cardiovascular:  Negative for chest pain.   Gastrointestinal:  Negative for abdominal pain and diarrhea.   Genitourinary:  Negative for frequency.   Musculoskeletal:  Negative for back pain.   Integumentary:  Negative for rash.   Neurological:  Negative for headaches.   Hematological:  Negative for adenopathy.   Psychiatric/Behavioral:  The patient is not nervous/anxious.        Objective:      Physical Exam  Vitals reviewed.   Constitutional:       General: She is not in acute distress.     Appearance: Normal appearance.   Cardiovascular:      Rate and Rhythm: Normal rate and regular rhythm.   Pulmonary:      Effort: Pulmonary effort is normal. No respiratory distress.      Breath sounds: Normal breath sounds. No wheezing or rales.   Chest:       Lymphadenopathy:      Cervical: No cervical adenopathy.      Upper Body:      Right upper body: No supraclavicular or axillary adenopathy.      Left upper body: No supraclavicular or axillary adenopathy.   Neurological:      Mental Status: She is alert and oriented to person, place, and time.   Psychiatric:         Mood and Affect: Mood normal.         Thought Content: Thought content normal.         Judgment: Judgment normal.       Assessment:       Problem List Items Addressed This Visit       BRCA2 gene mutation positive in female    Malignant neoplasm of upper-outer quadrant of left breast in female, estrogen receptor negative - Primary         Plan:     She has a small, node negative triple negative breast cancer.  I recommended adjuvant chemotherapy with 4 cycles of Taxotere and Cytoxan with growth factor support.  Side effects of those medications were discussed and I had previously provided written information for both medications.    Needs a port.     Route Chart for Scheduling    Med Onc Chart Routing      Follow up with physician 2 weeks. me or nan to start chemo -[address]   Follow up  with [facility]    Infusion scheduling note    Injection scheduling note    Labs CBC and CMP   Lab interval: every 3 weeks     Imaging    Pharmacy appointment    Other referrals          Treatment Plan Information   OP BREAST TC - DOCETAXEL CYCLOPHOSPHAMIDE Q3W   Zia Mendoza MD   Upcoming Treatment Dates - OP BREAST TC - DOCETAXEL CYCLOPHOSPHAMIDE Q3W    3/7/2023       Pre-Medications       palonosetron (ALOXI) 0.25 mg with Dexamethasone (DECADRON) 10 mg in NS 50 mL IVPB       aprepitant (CINVANTI) injection 130 mg       Chemotherapy       DOCEtaxel (TAXOTERE) 75 mg/m2 = 120 mg in sodium chloride 0.9% 256 mL chemo infusion       cycloPHOSphamide 600 mg/m2 = 960 mg in sodium chloride 0.9% 254.8 mL chemo infusion  3/8/2023       Growth Factor       pegfilgrastim-cbqv (UDENYCA) injection 6 mg  3/28/2023       Pre-Medications       palonosetron (ALOXI) 0.25 mg with Dexamethasone (DECADRON) 10 mg in NS 50 mL IVPB       aprepitant (CINVANTI) injection 130 mg       Chemotherapy       DOCEtaxel (TAXOTERE) 75 mg/m2 = 120 mg in sodium chloride 0.9% 256 mL chemo infusion       cycloPHOSphamide 600 mg/m2 = 960 mg in sodium chloride 0.9% 254.8 mL chemo infusion  3/29/2023       Growth Factor       pegfilgrastim-cbqv (UDENYCA) injection 6 mg

## 2023-02-23 ENCOUNTER — OFFICE VISIT (OUTPATIENT)
Dept: SURGERY | Facility: CLINIC | Age: 58
End: 2023-02-23
Payer: OTHER GOVERNMENT

## 2023-02-23 ENCOUNTER — OFFICE VISIT (OUTPATIENT)
Dept: HEMATOLOGY/ONCOLOGY | Facility: CLINIC | Age: 58
End: 2023-02-23
Attending: INTERNAL MEDICINE
Payer: OTHER GOVERNMENT

## 2023-02-23 ENCOUNTER — OFFICE VISIT (OUTPATIENT)
Dept: PLASTIC SURGERY | Facility: CLINIC | Age: 58
End: 2023-02-23
Payer: OTHER GOVERNMENT

## 2023-02-23 VITALS
WEIGHT: 141 LBS | HEART RATE: 74 BPM | HEIGHT: 59 IN | BODY MASS INDEX: 28.43 KG/M2 | TEMPERATURE: 98 F | DIASTOLIC BLOOD PRESSURE: 76 MMHG | SYSTOLIC BLOOD PRESSURE: 133 MMHG | OXYGEN SATURATION: 98 %

## 2023-02-23 VITALS
HEART RATE: 65 BPM | WEIGHT: 139.75 LBS | BODY MASS INDEX: 28.17 KG/M2 | TEMPERATURE: 99 F | OXYGEN SATURATION: 97 % | SYSTOLIC BLOOD PRESSURE: 143 MMHG | DIASTOLIC BLOOD PRESSURE: 82 MMHG | HEIGHT: 59 IN | RESPIRATION RATE: 17 BRPM

## 2023-02-23 DIAGNOSIS — Z15.09 BRCA GENE MUTATION POSITIVE: Primary | ICD-10-CM

## 2023-02-23 DIAGNOSIS — Z15.02 BRCA2 GENE MUTATION POSITIVE IN FEMALE: ICD-10-CM

## 2023-02-23 DIAGNOSIS — C50.412 MALIGNANT NEOPLASM OF UPPER-OUTER QUADRANT OF LEFT BREAST IN FEMALE, ESTROGEN RECEPTOR NEGATIVE: Primary | ICD-10-CM

## 2023-02-23 DIAGNOSIS — Z15.01 BRCA2 GENE MUTATION POSITIVE IN FEMALE: ICD-10-CM

## 2023-02-23 DIAGNOSIS — C50.412 MALIGNANT NEOPLASM OF UPPER-OUTER QUADRANT OF LEFT BREAST IN FEMALE, ESTROGEN RECEPTOR NEGATIVE: ICD-10-CM

## 2023-02-23 DIAGNOSIS — Z17.1 MALIGNANT NEOPLASM OF UPPER-OUTER QUADRANT OF LEFT BREAST IN FEMALE, ESTROGEN RECEPTOR NEGATIVE: Primary | ICD-10-CM

## 2023-02-23 DIAGNOSIS — Z17.1 MALIGNANT NEOPLASM OF UPPER-OUTER QUADRANT OF LEFT BREAST IN FEMALE, ESTROGEN RECEPTOR NEGATIVE: ICD-10-CM

## 2023-02-23 DIAGNOSIS — Z15.09 BRCA2 GENE MUTATION POSITIVE IN FEMALE: ICD-10-CM

## 2023-02-23 DIAGNOSIS — Z15.01 BRCA GENE MUTATION POSITIVE: Primary | ICD-10-CM

## 2023-02-23 PROCEDURE — 99213 OFFICE O/P EST LOW 20 MIN: CPT | Mod: PBBFAC,27 | Performed by: SURGERY

## 2023-02-23 PROCEDURE — 99024 PR POST-OP FOLLOW-UP VISIT: ICD-10-PCS | Mod: ,,, | Performed by: SURGERY

## 2023-02-23 PROCEDURE — 99999 PR PBB SHADOW E&M-EST. PATIENT-LVL III: ICD-10-PCS | Mod: PBBFAC,,, | Performed by: SURGERY

## 2023-02-23 PROCEDURE — 99024 POSTOP FOLLOW-UP VISIT: CPT | Mod: ,,, | Performed by: SURGERY

## 2023-02-23 PROCEDURE — 99213 OFFICE O/P EST LOW 20 MIN: CPT | Mod: S$PBB,,, | Performed by: INTERNAL MEDICINE

## 2023-02-23 PROCEDURE — 99999 PR PBB SHADOW E&M-EST. PATIENT-LVL II: ICD-10-PCS | Mod: PBBFAC,,, | Performed by: SURGERY

## 2023-02-23 PROCEDURE — 99213 PR OFFICE/OUTPT VISIT, EST, LEVL III, 20-29 MIN: ICD-10-PCS | Mod: S$PBB,,, | Performed by: INTERNAL MEDICINE

## 2023-02-23 PROCEDURE — 99999 PR PBB SHADOW E&M-EST. PATIENT-LVL IV: ICD-10-PCS | Mod: PBBFAC,,, | Performed by: INTERNAL MEDICINE

## 2023-02-23 PROCEDURE — 99999 PR PBB SHADOW E&M-EST. PATIENT-LVL IV: CPT | Mod: PBBFAC,,, | Performed by: INTERNAL MEDICINE

## 2023-02-23 PROCEDURE — 99212 OFFICE O/P EST SF 10 MIN: CPT | Mod: PBBFAC,27 | Performed by: SURGERY

## 2023-02-23 PROCEDURE — 99999 PR PBB SHADOW E&M-EST. PATIENT-LVL II: CPT | Mod: PBBFAC,,, | Performed by: SURGERY

## 2023-02-23 PROCEDURE — 99214 OFFICE O/P EST MOD 30 MIN: CPT | Mod: PBBFAC | Performed by: INTERNAL MEDICINE

## 2023-02-23 PROCEDURE — 99999 PR PBB SHADOW E&M-EST. PATIENT-LVL III: CPT | Mod: PBBFAC,,, | Performed by: SURGERY

## 2023-02-23 RX ORDER — ONDANSETRON 4 MG/1
4 TABLET, ORALLY DISINTEGRATING ORAL EVERY 8 HOURS PRN
Qty: 20 TABLET | Refills: 3 | OUTPATIENT
Start: 2023-02-23 | End: 2023-02-23 | Stop reason: SDUPTHER

## 2023-02-23 RX ORDER — DEXAMETHASONE 4 MG/1
TABLET ORAL
Qty: 40 TABLET | Refills: 0 | OUTPATIENT
Start: 2023-02-23 | End: 2023-02-23 | Stop reason: SDUPTHER

## 2023-02-23 RX ORDER — DEXAMETHASONE 4 MG/1
TABLET ORAL
Qty: 40 TABLET | Refills: 0 | Status: SHIPPED | OUTPATIENT
Start: 2023-02-23 | End: 2023-07-03

## 2023-02-23 RX ORDER — ONDANSETRON 4 MG/1
4 TABLET, ORALLY DISINTEGRATING ORAL EVERY 8 HOURS PRN
Qty: 20 TABLET | Refills: 3 | OUTPATIENT
Start: 2023-02-23 | End: 2023-03-02

## 2023-02-23 RX ORDER — ONDANSETRON 4 MG/1
4 TABLET, ORALLY DISINTEGRATING ORAL EVERY 8 HOURS PRN
Qty: 20 TABLET | Refills: 3 | Status: SHIPPED | OUTPATIENT
Start: 2023-02-23 | End: 2023-03-02

## 2023-02-23 RX ORDER — HYDROXYZINE HYDROCHLORIDE 50 MG/1
50 TABLET, FILM COATED ORAL
COMMUNITY
Start: 2022-12-02

## 2023-02-23 RX ORDER — FLUCONAZOLE 150 MG/1
150 TABLET ORAL
COMMUNITY
Start: 2022-09-21 | End: 2023-07-03

## 2023-02-23 RX ORDER — DEXAMETHASONE 4 MG/1
TABLET ORAL
Qty: 40 TABLET | Refills: 0 | OUTPATIENT
Start: 2023-02-23 | End: 2023-07-03

## 2023-02-23 RX ORDER — ASCORBIC ACID 500 MG
500 TABLET ORAL
COMMUNITY
Start: 2023-02-15

## 2023-02-23 RX ORDER — METHOCARBAMOL 500 MG/1
TABLET, FILM COATED ORAL
COMMUNITY
Start: 2023-02-03

## 2023-02-23 RX ORDER — ALBUTEROL SULFATE 90 UG/1
AEROSOL, METERED RESPIRATORY (INHALATION)
COMMUNITY
Start: 2023-01-11

## 2023-02-23 NOTE — PROGRESS NOTES
[facility]       Post-Op        REFERRING PHYSICIAN:  No referring provider defined for this encounter.       Lorena Fuentes MD    MEDICAL ONCOLOGIST:    Zia Mendoza MD  RADIATION ONCOLOGIST:   pending    DIAGNOSIS:    This is a 57 y.o. female with a stage pT1b N0 Mx grade 3 ER neg WI neg HER2 neg invasive ductal carcinoma of the left breast.    TREATMENT SUMMARY:  The patient is status post bilateral mastectomy and left sentinel node biopsy with BL JUNAID flap recon on 2/8/2023.  Final pathology showed invasive ductal carcinoma. 0/2 nodes.     1.  Left breast, mastectomy:   Invasive ductal carcinoma, Grade 3 (tubules = 3, nuclei = 3, mitoses = 3),   measuring 9 mm (0.9 cm) in greatest linear dimension   All margins are negative for invasive carcinoma   Nonneoplastic breast with foci of fat necrosis and giant cell reaction   Unremarkable skin   Biopsy clip identified grossly and biopsy site changes noted microscopically   See synoptic report below   Best tumor block: 1B   2. Left axillary sentinel lymph node #1 hot 260, excision:   1 lymph node with no evidence of metastatic carcinoma (0/1)   Multiple H&E levels were examined.  Immunohistochemical stains for AE1/AE3,   WSK, and Cam 5.2 were performed with adequate controls and are negative   3. Left axillary sentinel lymph node #2 hot 60, excision:   1 lymph node with no evidence of metastatic carcinoma (0/1)   Multiple H&E levels were examined.  Immunohistochemical stains for AE1/AE3,   WSK, and Cam 5.2 were performed with adequate controls and are negative   4. Right breast, mastectomy:   Benign breast parenchyma and unremarkable skin   Biopsy clip identified grossly and biopsy site changes identified   microscopically   Negative for atypia or malignancy   5. Right breast skin, excision:   Benign skin   Negative for atypia or malignancy   6.  Left breast skin, excision:   Benign skin   Negative for atypia or malignancy   Surgical pathology cancer  case summary:   Procedure:  Total mastectomy   Specimen laterality:  Left   Tumor site: 2:00   Histologic type:  Invasive carcinoma of no special type (ductal)   Histologic grade ([place] histologic Score):   Glandular (acinar/tubular) differentiation:  Score 3   Nuclear pleomorphism:  Score 3   Mitotic rate:  Score 3   Overall grade:  Grade 3 (score 9)   Tumor size:  Greatest dimension of largest invasive focus greater than 1 mm:   9 mm   Tumor focality:  Single focus of invasive carcinoma   Ductal carcinoma in Situ (DCIS):  Not identified   Lobular carcinoma in Situ (LCIS):  Not identified   Lymphovascular invasion:  Not identified   Dermal lymphovascular invasion:  Not identified   Microcalcifications:  Not identified   Treatment effect in the breast:  No known presurgical therapy   Treatment effect in the lymph nodes:  Not applicable   Margins status for invasive carcinoma:  All margins negative for invasive   carcinoma   Distance from invasive carcinoma to closest margin:  17 mm   Closest margin to invasive carcinoma:  Anterior superior   All other margins are located greater than 26 mm from invasive carcinoma   Regional lymph node status:  Regional lymph nodes present   All regional lymph nodes negative for tumor   Total number of lymph nodes examined (sentinel and nonsentinel):  2   Total number of sentinel:  2   Distant metastases:  Not applicable   Pathologic stage classification (pTNM)   pT: pT1b:  Tumor greater than 5 mm but less than or equal to 10 mm in   greatest dimension   Regional lymph nodes modifier: (sn):  [place] nodes evaluated   pN: (sn)pN0: No regional lymph node metastases identified   pM:  Not applicable   Breast biomarker testing performed on previous biopsy outside case   GARY- and reported by outside facility without internal review as:   ER:  Negative (less than 1%)   OH:  Negative (less than 1%)   HER2:  Negative by FISH   Ki-67:  78.5%     INTERVAL HISTORY:   Trinity GREEN  Patience comes in for a post-op check.  She denies fever, chills, chest pain or shortness of breath.  Her pain is well controlled.  Abd drain with minimal SSO.     MEDICATIONS:  Current Outpatient Medications   Medication Sig Dispense Refill    albuterol (PROVENTIL/VENTOLIN HFA) 90 mcg/actuation inhaler INHALE 1 PUFF BY MOUTH FOUR TIMES A DAY AS NEEDED      ALBUTEROL INHL Inhale into the lungs 4 (four) times daily as needed.      ascorbic acid, vitamin C, (VITAMIN C) 500 MG tablet 500 mg.      cyanocobalamin, vitamin B-12, 50 mcg tablet Take 50 mcg by mouth once daily.      dexAMETHasone (DECADRON) 4 MG Tab 2 tabs twice daily, the day before and the day after chemotherapy 40 tablet 0    dexAMETHasone (DECADRON) 4 MG Tab 2 tabs twice daily, the day before and the day after chemotherapy (Patient not taking: Reported on 4/6/2023) 40 tablet 0    fluconazole (DIFLUCAN) 150 MG Tab 150 mg.      hydrocortisone 2.5 % cream Apply topically 2 (two) times daily. 20 g 0    hydrOXYzine (ATARAX) 50 MG tablet 50 mg.      LIDOcaine-prilocaine (EMLA) cream Apply topically as needed. 30 g 3    methocarbamoL (ROBAXIN) 500 MG Tab TAKE 1 TO 2 TABLETS BY MOUTH THREE TIMES A DAY AS NEEDED FOR MUSCLE SPASMS      oxyCODONE (OXY-IR) 5 mg Cap Take 1 capsule (5 mg total) by mouth every 4 (four) hours as needed for Pain. (Patient not taking: Reported on 3/7/2023) 10 capsule 0    vitamin D (VITAMIN D3) 1000 units Tab Take 1,000 Units by mouth once daily.       No current facility-administered medications for this visit.     Facility-Administered Medications Ordered in Other Visits   Medication Dose Route Frequency Provider Last Rate Last Admin    LIDOcaine (PF) 10 mg/ml (1%) injection 10 mg  1 mL Intradermal Once Bee Hernandez PA-C        mupirocin 2 % ointment   Nasal On Call Procedure Bee Hernandez PA-C   Given at 02/08/23 0617    sodium chloride 0.9% flush 10 mL  10 mL Intravenous PRN Bee Hernandez PA-C           ALLERGIES:   Review  of patient's allergies indicates:   Allergen Reactions    Nitrofurantoin monohyd/m-cryst Hives    Sulfamethoxazole-trimethoprim Anaphylaxis, Itching, Rash, Swelling and Hives    Dextromethorphan      [facility]    Macrodantin [nitrofurantoin macrocrystalline]     Rifampin      [facility] record    Sulfa (sulfonamide antibiotics)        PHYSICAL EXAMINATION:   General:  This is a well appearing female with appropriate speech, affect and gait.     Breast:  Incision clean, dry, and intact. Abd drain with SSO. BL JUNAID flaps healing well    IMPRESSION:   The patient has had an uneventful postoperative course.    PLAN:   1. return in 6 months for a follow up office visit and breast exam  2. The patient is advised in continued exam of the breast chest wall and to report to this office sooner should she note any areas of abnormality or concern.   3.  She has been instructed to meet with med onc for discussion of adjuvant treatment recommendations  4. Continue follow up with Plastic surgery for management of drain.

## 2023-02-23 NOTE — PLAN OF CARE
START ON PATHWAY REGIMEN - Breast    [id number]        Docetaxel (Taxotere)       Cyclophosphamide           Additional Orders: Premedicate with dexamethasone 8 mg PO bid for three   days beginning 1 day prior to therapy    **Always confirm dose/schedule in your pharmacy ordering system**    Patient Characteristics:  Postoperative without Neoadjuvant Therapy (Pathologic Staging), Invasive   Disease, Adjuvant Therapy, HER2 Negative/Unknown/Equivocal, ER Negative/Unknown,   Node Negative, pT1a-b, N0, Chemotherapy Indicated  Therapeutic Status: Postoperative without Neoadjuvant Therapy (Pathologic   Staging)  AJCC Grade: G3  AJCC N Category: pN0  AJCC M Category: cM0  ER Status: Negative (-)  AJCC 8 Stage Grouping: IB  HER2 Status: Negative (-)  Oncotype Dx Recurrence Score: Not Appropriate  AJCC T Category: pT1b  NV Status: Negative (-)  Adjuvant Therapy Status: No Adjuvant Therapy Received Yet or Changing Initial   Adjuvant Regimen due to Tolerance  Intervention Indicated: Chemotherapy  Intent of Therapy:  Curative Intent, Discussed with Patient

## 2023-02-23 NOTE — PROGRESS NOTES
Trinity Morin presents to [facility] for a follow up visit status post bilateral mastectomy with immediately JUNAID flap reconstruction on 2/8/23. She is doing well today with no issues since her last visit. Right lower abdominal OSBALDO drain is putting out less than 30ccs of serous drainage daily for the last 3 days, ready to come out. She is on her way to an oncology appointment today following this. She expects a 12 week course of chemotherapy, no radiation. She contacted the office with questions regarding driving and is clear to drive. She denies fever, chills, nausea, vomiting or other systemic signs of infection.       ROS - negative, other than stated above    PHYSICAL EXAMINATION  There were no vitals filed for this visit.  WD WN NAD  VSS  Normal respiratory effort  R breast - incision CDI, no erythema or drainage, nipple viable, OSBALDO drain in place, skin paddle healthy in appearance, bruising noted to mastectomy skin flaps  L breast - incision CDI, no erythema or drainage, nipple viable, OSBALDO drain in place, skin paddle healthy in appearance, bruising noted to mastectomy skin flaps  Abdominal incision CDI, no erythema or drainage, bilateral OSBALDO drains in place    ASSESSMENT/PLAN  57 y.o. F s/p bilateral JUNAID flaps  - Doing well, no issues   - Abdominal OSBALDO drain removed  - Umbilical sutures removed  - Counseled on incision care, continue wearing soft bra without underwire for 8 weeks, use abdominal binder as needed  - RTC x 3 months, appointment scheduled.      All questions were answered. The patient was advised to contact the clinic with any questions or concerns prior to their next visit.       Bee Hernandez PA-C  Plastic and Reconstructive Surgery

## 2023-02-24 ENCOUNTER — DOCUMENTATION ONLY (OUTPATIENT)
Dept: HEMATOLOGY/ONCOLOGY | Facility: CLINIC | Age: 58
End: 2023-02-24
Payer: OTHER GOVERNMENT

## 2023-02-24 NOTE — PROGRESS NOTES
Pt met with Dr Jiménez for post op appt, also with Dr Mendoza and Dr Curry.  Pt scheduled for 6 month follow up with Dr Jiménez.  No additional needs at present.  Oncology Navigation   Intake  Date of Diagnosis: 11/17/22  Cancer Type: Breast  Internal / External Referral: External  Date of Referral: 12/02/22  Initial Nurse Navigator Contact: 12/13/22  Referral to Initial Contact Timeline (days): 11  Date Worked: 12/29/22  First Appointment Available: 12/27/22  Appointment Date: 12/27/22  First Available Date vs. Scheduled Date (days): 0  Multiple appointments: Yes     Treatment  Current Status: Staging work-up    Surgical Oncologist: Dr.Amy Jiménez  Plastic Surgeon: Patricio  Type of Surgery: Bilateral nipple sparing mastectomy with LSLNB and JUNAID  Consult Date: 12/29/22    Medical Oncologist: Reji  Consult Date: 12/29/22       Procedures: Biopsy; MRI; Genetic test  Biopsy Schedule Date: 11/17/22  Genetic Testing Date Sent:  (pt reports completed 9yrs ago, trying to get results)  MRI Schedule Date: 05/27/22    General Referrals: [facility]                                             hide              Acuity      Follow Up  No follow-ups on file.

## 2023-03-02 NOTE — OPERATIVE NOTE ADDENDUM
Certification of Assistant at Surgery       Surgery Date: 2/8/2023     Participating Surgeons:  Surgeon(s) and Role:  Panel 1:     * Kim Jiménez MD - Primary  Panel 2:     * Kevin Curry DO - Primary     * Gopal Hoover MD - Assisting    Procedures:  Procedure(s) (LRB):  MASTECTOMY, BILATERAL (Bilateral)  BIOPSY, LYMPH NODE, SENTINEL (Left)  INJECTION, FOR SENTINEL NODE IDENTIFICATION (Left)  MASTECTOMY, WITH SENTINEL NODE BIOPSY AND AXILLARY LYMPHADENECTOMY (Left)  RECONSTRUCTION, BREAST, USING JUNAID SKIN FLAP (Bilateral)    Assistant Surgeon's Certification of Necessity:  I understand that section 1842 (b) (6) (d) of the Social Security Act generally prohibits Medicare Part B reasonable charge payment for the services of assistants at surgery in teaching hospitals when qualified residents are available to furnish such services. I certify that the services for which payment is claimed were medically necessary, and that no qualified resident was available to perform the services. I further understand that these services are subject to post-payment review by the Medicare carrier.      Gopal Hoover MD    03/02/2023  11:11 AM

## 2023-03-03 ENCOUNTER — TELEPHONE (OUTPATIENT)
Dept: PLASTIC SURGERY | Facility: CLINIC | Age: 58
End: 2023-03-03
Payer: OTHER GOVERNMENT

## 2023-03-03 ENCOUNTER — PATIENT MESSAGE (OUTPATIENT)
Dept: PLASTIC SURGERY | Facility: CLINIC | Age: 58
End: 2023-03-03
Payer: OTHER GOVERNMENT

## 2023-03-03 NOTE — TELEPHONE ENCOUNTER
Moves from left to right when pressing across lower abdomen and firm and tight at night when press on abdomen - denies pain or fever  slow progression over the last 3 days - advised pt will talk with Bee MONCADA and Dr Curry and will be back in touch - asked pt to send photos to portal - pt voiced understanding- all questions and concerns addressed at this time- ------               Message from Oxana Walton sent at 3/3/2023  9:26 AM CST -----  Contact: Pt  Pt is requesting a callback from provider staff in regards to drainage. Pt stated she was told to reach out if her stomach started to collect fluid after the tube removal. Please adv pt        Confirmed contact below:   Contact Name:Trinity Morin  Phone Number: [phone]

## 2023-03-06 ENCOUNTER — OFFICE VISIT (OUTPATIENT)
Dept: OTOLARYNGOLOGY | Facility: CLINIC | Age: 58
End: 2023-03-06
Payer: OTHER GOVERNMENT

## 2023-03-06 ENCOUNTER — CLINICAL SUPPORT (OUTPATIENT)
Dept: REHABILITATION | Facility: HOSPITAL | Age: 58
End: 2023-03-06
Attending: SURGERY
Payer: OTHER GOVERNMENT

## 2023-03-06 VITALS — OXYGEN SATURATION: 98 % | SYSTOLIC BLOOD PRESSURE: 128 MMHG | HEART RATE: 80 BPM | DIASTOLIC BLOOD PRESSURE: 79 MMHG

## 2023-03-06 DIAGNOSIS — C50.412 MALIGNANT NEOPLASM OF UPPER-OUTER QUADRANT OF LEFT BREAST IN FEMALE, ESTROGEN RECEPTOR NEGATIVE: ICD-10-CM

## 2023-03-06 DIAGNOSIS — J38.2 VOCAL FOLD NODULES: ICD-10-CM

## 2023-03-06 DIAGNOSIS — Z17.1 MALIGNANT NEOPLASM OF UPPER-OUTER QUADRANT OF LEFT BREAST IN FEMALE, ESTROGEN RECEPTOR NEGATIVE: ICD-10-CM

## 2023-03-06 DIAGNOSIS — R49.0 DYSPHONIA: Primary | ICD-10-CM

## 2023-03-06 PROCEDURE — 97535 SELF CARE MNGMENT TRAINING: CPT

## 2023-03-06 PROCEDURE — 99214 OFFICE O/P EST MOD 30 MIN: CPT | Mod: PBBFAC,25 | Performed by: OTOLARYNGOLOGY

## 2023-03-06 PROCEDURE — 97162 PT EVAL MOD COMPLEX 30 MIN: CPT

## 2023-03-06 PROCEDURE — 99999 PR PBB SHADOW E&M-EST. PATIENT-LVL IV: ICD-10-PCS | Mod: PBBFAC,,, | Performed by: OTOLARYNGOLOGY

## 2023-03-06 PROCEDURE — 99213 OFFICE O/P EST LOW 20 MIN: CPT | Mod: 25,S$PBB,, | Performed by: OTOLARYNGOLOGY

## 2023-03-06 PROCEDURE — 31579 LARYNGOSCOPY TELESCOPIC: CPT | Mod: S$PBB,,, | Performed by: OTOLARYNGOLOGY

## 2023-03-06 PROCEDURE — 99213 PR OFFICE/OUTPT VISIT, EST, LEVL III, 20-29 MIN: ICD-10-PCS | Mod: 25,S$PBB,, | Performed by: OTOLARYNGOLOGY

## 2023-03-06 PROCEDURE — 31579 PR LARYNGOSCOPY, FLEX/RIGID TELESCOPIC, W/STROBOSCOPY: ICD-10-PCS | Mod: S$PBB,,, | Performed by: OTOLARYNGOLOGY

## 2023-03-06 PROCEDURE — 31579 LARYNGOSCOPY TELESCOPIC: CPT | Mod: PBBFAC | Performed by: OTOLARYNGOLOGY

## 2023-03-06 PROCEDURE — 99999 PR PBB SHADOW E&M-EST. PATIENT-LVL IV: CPT | Mod: PBBFAC,,, | Performed by: OTOLARYNGOLOGY

## 2023-03-06 NOTE — PATIENT INSTRUCTIONS
POST-OP PATIENT EDUCATION        When to call your doctor:  - If any part of your affected arm or axilla is hot, red, or has increased swelling   - If you develop a temperature over 101 degrees Fahrenheit      Lymphedema - Identification and Prevention     Lymphedema - is the swelling of a body area or extremity caused by the accumulation of lymphatic fluid.  There is a risk for lymphedema with the removal of lymph nodes, trauma or radiation therapy.  Treatment of breast cancer often involves surgery: mastectomy or lumpectomy. Some of the lymph nodes in the underarm (called axillary lymph nodes) may be removed and checked to see if they contain cancer cells.     During breast surgery when axillary lymph nodes are removed (with sentinel node biopsy or axillary dissection) or are treated with radiation therapy, the lymphatic system may become impaired. This may prevent lymphatic fluid from leaving the area therefore, causing lymphedema.     Lymphatic fluid is a normal part of the circulatory system. Its function is to remove waste products and to produce cells vital to fighting infection. Swelling occurs when the vessels become restricted and the lymphatic fluid is unable to freely flow through them.  If lymphedema is left untreated, the affected limb could progressively become more swollen, which could lead to hardening of the skin, bulkiness in the limb, infection and impaired wound healing.     There are things you can do to decrease the chance of developing lymphedema.        [email or web address]          The information presented is intended for general information and educational purposes. It is not intended to replace the advice of your health care provider. Contact your health care provider if you believe you have a health problem.                                          POST OP EXERCISES - SAFE TO DO THE FIRST 2 WEEKS AFTER SURGERY UNTIL YOUR FOLLOW UP APPOINTMENT  WITH PHYSICAL/OCCUPATIONAL THERAPY    Scapular Retraction (Standing)    With arms at sides, squeeze shoulder blades together. Do not shrug shoulders and do not hold your breath. Hold 5 seconds. Repeat 10 times, 2-3 sets, 2 x day.       Exaggerated Breathing and Relaxation      Practice deep breathing frequently in the first few days following surgery even before you begin exercising. This exercise helps with tissue extensibility in the chest wall.  Inhale slowly and deeply through the nose and exhale through pursed lips. Concentrate on relaxing as you let the air out of your lungs. Repeat three (3) to four (4) times, remembering to breath in deeply and then relaxing. This exercise helps to ease the sensation of pulling and discomfort that may be experienced while exercising.      Ball Squeeze OR Hand pumps       Perform this exercise three (3) times a day for 1-3 minutes each time.    The ball squeeze or hand pumps helps to prevent or reduce temporary swelling that may occur in the affected arm. This exercise may be performed standing, sitting or while lying in bed. During this exercise the affected arm should be slightly bent and held upward. Support your arm with a pillow if you are uncomfortable holding it up.    a. Hold a rubber ball in your hand on the affected side and lift your arm upward.  b. Alternate squeezing and relaxing the ball.        AROM: Elbow Flexion / Extension        With left hand palm up, gently bend elbow as far as possible. Then straighten arm as far as possible. Do this in standing.   Repeat 10 times per set. Do 2-3 sets per session. Do 1-3 sessions per day.

## 2023-03-06 NOTE — PROGRESS NOTES
[facility]  Department of Otorhinolaryngology and Communication Sciences    Subjective:      Trinity Morin is a 57 y.o. female who presents for follow-up. She has a left vocal fold phonotraumatic lesion and a contralateral reactive lesion, with secondary muscle tension dypshonia.    She returns today for reassessment.  She reports her voice continues to be somewhat raspy and feels it might be slightly worse since it was at the time of her last visit.  She is still undergoing chemotherapy for breast cancer.  She as yet has not worked with our SLP team for voice therapy    The patient's medications, allergies, past medical, surgical, social and family histories were reviewed and updated as appropriate.    A detailed review of systems was obtained with pertinent positives as per the above HPI, and otherwise negative.      Objective:     /79   Pulse 80   SpO2 98%    Constitutional: comfortable, well dressed  Psychiatric: appropriate affect  Respiratory: comfortably breathing, symmetric chest rise, no stridor  Voice:  Mild variable roughness/hardy; pitch break in upper register  Head: normocephalic  Eyes: conjunctivae and sclerae clear  Indirect laryngoscopy is limited due to gag    Procedure  Flexible Laryngeal Videostroboscopy (55419): Laryngeal videostroboscopy is indicated to assess laryngeal vibratory biomechanics and vocal fold oscillation, which cannot be assessed with a plain light examination. This was carried out transnasally with a distal chip videoendoscope. After verbal consent was obtained, the patient was positioned and the nose was topically decongested with 1% phenylephrine and topically anesthetized with 4% lidocaine. The endoscope was passed through the most patent nasal cavity and positioned to image the nasopharynx, larynx, and hypopharynx in detail. The following features were examined: nasopharyngeal, laryngeal, hypopharyngeal masses; velopharyngeal strength, closure, and  symmetry of motion; vocal fold range and symmetry of motion; laryngeal mucosal edema, erythema, inflammation, and hydration; salivary pooling; and gross laryngeal sensation. During phonation, the vocal folds were assessed for glottal closure; mucosal wave; vocal fold lesions; vibratory periodicity, amplitude, and phase symmetry; and vertical height match. The equipment was removed. The patient tolerated the procedure well without complication. All findings were normal except:  - left vocal fold with sessile convexity along free edge, middle third  - smaller convexity along free edge of right vocal fold, middle third  - premature contact, hourglass closure, pliability intact  - mild insufficiency with A/P gap in uppermost register      Assessment:     Trinity Morin is a 57 y.o. 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     Plan:     SLP voice evaluation and subsequent therapy sessions are medically necessary for restoration of laryngeal function. She will contact us in the future to schedule this.    Depending on her progress, or lack thereof, microlaryngeal surgical intervention could be considered.    She will follow up with me  in 3-4 months, or sooner if needed .    All questions were answered, and the patient is in agreement with the plan.    Brice Vazquez M.D.  [facility]  Department of Otorhinolaryngology and Communication Sciences

## 2023-03-07 ENCOUNTER — OFFICE VISIT (OUTPATIENT)
Dept: PLASTIC SURGERY | Facility: CLINIC | Age: 58
End: 2023-03-07
Payer: OTHER GOVERNMENT

## 2023-03-07 VITALS
BODY MASS INDEX: 28 KG/M2 | HEIGHT: 59 IN | SYSTOLIC BLOOD PRESSURE: 132 MMHG | HEART RATE: 68 BPM | WEIGHT: 138.88 LBS | DIASTOLIC BLOOD PRESSURE: 79 MMHG

## 2023-03-07 DIAGNOSIS — C50.412 MALIGNANT NEOPLASM OF UPPER-OUTER QUADRANT OF LEFT BREAST IN FEMALE, ESTROGEN RECEPTOR NEGATIVE: Primary | ICD-10-CM

## 2023-03-07 DIAGNOSIS — Z17.1 MALIGNANT NEOPLASM OF UPPER-OUTER QUADRANT OF LEFT BREAST IN FEMALE, ESTROGEN RECEPTOR NEGATIVE: Primary | ICD-10-CM

## 2023-03-07 PROCEDURE — 99999 PR PBB SHADOW E&M-EST. PATIENT-LVL III: ICD-10-PCS | Mod: PBBFAC,,, | Performed by: SURGERY

## 2023-03-07 PROCEDURE — 99024 PR POST-OP FOLLOW-UP VISIT: ICD-10-PCS | Mod: ,,, | Performed by: SURGERY

## 2023-03-07 PROCEDURE — 99213 OFFICE O/P EST LOW 20 MIN: CPT | Mod: PBBFAC | Performed by: SURGERY

## 2023-03-07 PROCEDURE — 99999 PR PBB SHADOW E&M-EST. PATIENT-LVL III: CPT | Mod: PBBFAC,,, | Performed by: SURGERY

## 2023-03-07 PROCEDURE — 99024 POSTOP FOLLOW-UP VISIT: CPT | Mod: ,,, | Performed by: SURGERY

## 2023-03-07 NOTE — PROGRESS NOTES
Trinity Morin presents to [facility] for a follow up visit status post bilateral mastectomy with immediate JUNAID flap reconstruction on 2/8/23. She has been doing well postoperatively and her final lower abdominal drain was removed on her last clinic visit, noting output was <30ccs daily for 3 consecutive days. She contacted the clinic with concerns of possible seroma formation in the interim and presents today for evaluation. She notes the lower abdomen feels more full. She denies fever, chills, nausea, vomiting or other systemic signs of infection.       ROS - negative, other than stated above    PHYSICAL EXAMINATION  Vitals:    03/07/23 1149   BP: 132/79   Pulse: 68     WD WN NAD  VSS  Normal respiratory effort  Bilateral breast flaps are soft  Abdominal incision well healing, soft fluctuant area of subcutaneous fluid palpated along the incision. No surrounding erythema, induration, or pain.    Procedure Note  Bedside ultrasound was used to identify a pocket of subcutaneous fluid along the lower abdominal incision approximately 3cm deep to the surface. The skin was cleaned with alcohol prep. A butterfly needle was introduced into the pocket using ultrasound guidance. 80ccs of serosanguinous fluid was aspirated from the pocket. The patient tolerated this well. US confirmed resolution of the fluid collection. A Band-Aid was used to dress the drainage site.     ASSESSMENT/PLAN  57 y.o. F s/p bilateral JUNAID flaps  - Here today with seroma of lower abdominal incision after drains were removed  - RTC x prn , appointment scheduled.      All questions were answered. The patient was advised to contact the clinic with any questions or concerns prior to their next visit.       Bee Hernandez PA-C  Plastic and Reconstructive Surgery

## 2023-03-07 NOTE — PLAN OF CARE
"[facility]  Physical Therapy Initial Evaluation    Date: 3/6/2023   Name: Trinity Morin  Clinic Number: [id number]    Therapy Diagnosis: No diagnosis found.  Physician: Kim Jiménez MD    Physician Orders: PT Eval and Treat  Medical Diagnosis: C50.412,Z17.1 (ICD-10-CM) - Malignant neoplasm of upper-outer quadrant of left breast in female, estrogen receptor negative  Evaluation Date: 2023  Authorization Period Expiration: 2023  Plan of Care Certification Period: 2023  Visit # / Visits Authorized:   Insurance: [employer] / [facility] [employer]  FOTO: 1/3    Time In: 3:12 PM (late arrival)  Time Out: 4:02 PM  Total Billable Time: 50 minutes    Precautions: Standard and cancer      History   History of Present Illness: Trinity is a 57 y.o. female that presents to [facility] at the [facility] s/p BILATERAL mastectomy with JUNAID flap reconstruction on 2023.      Diagnosis: Stage IB (pT1b, pN0(sn), cM0, G3, ER-, VT-, HER2-) IDC of LEFT breast    Chief complaint: residual "tightness" and "fluid" in abdomen following bilateral mastectomy with JUNAID flap reconstruction approx 4 weeks ago. Patient denies symptom fluctuation with activity or food/drink but scheduled for "procedure to hopefully drain this fluid" on 2023. Patient has not noted significant shoulder range of motion / strength deficits secondary to not attempting much overhead motion since OSBALDO drain (x4) removal. Patient unsure of safe exercises to perform as well as any remaining post-operative precautions.     Surgical History:  Trinity Morin  has a past surgical history that includes  section; Tubal ligation; Exploratory laparotomy; Cervical biopsy; Foot surgery; Reduction of both breasts (Bilateral, 2021); Bilateral mastectomy (Bilateral, 2023); [place] lymph node biopsy (Left, 2023); Injection for sentinel node identification " "(Left, 2/8/2023); Mastectomy with sentinel node biopsy and axillary lymph node dissection (Left, 2/8/2023); and Reconstruction of breast with deep inferior epigastric artery  (JUNAID) flap (Bilateral, 2/8/2023).      Cancer-Related Surgery and Date: BILATERAL mastectomy with SLNB (2) with immediate JUNAID flap reconstruction on 02/08/2023 per Viola Jiménez and Patricio.     Chemo: adjuvant 4 cycles (Taxotere + Cytoxan), not yet started  Radiation: none  Hormone Therapy: none    Past Medical History:   Diagnosis Date    Asthma     General anesthetics causing adverse effect in therapeutic use     Sleep apnea     wears mouth device        Medications:  Trinity has a current medication list which includes the following prescription(s): albuterol, albuterol, ascorbic acid (vitamin c), cyanocobalamin (vitamin b-12), dexamethasone, dexamethasone, fluconazole, hydroxyzine, methocarbamol, oxycodone, and vitamin d, and the following Facility-Administered Medications: lidocaine (pf) 10 mg/ml (1%), mupirocin, and sodium chloride 0.9%.      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)  Prior Level of Function: independent  Current Level of Function: occasional assistance with overhead reaching, lifting from parents  Exercise Routine Prior to Onset: none  Hand Dominance: right      Other Past Medical History: none    Patient's Goals: to relieve abdominal distention and to learn safe exercises to improve range of motion and " "strength      Subjective     Patient States: "I'm doing ok, I just need to get this [abdomen] drained."  Pain: 2/10 on VAS currently  Best: 2/10,  Worst: 2/10   Pain Location: abdomen     Objective     Mental Status: A/O x 3    Postural Exam / Scapula Alignment: FHP, rounded shoulders    Skin Integrity: not formally assessed secondary to time constraints. Patient denies signs / symptoms of infection, reports applying ointment, and attempts scar massage.    Scar Location: N/A  Appearance: N/A  Signs of Infection: None  Drainage: None  Color: N/A    Edema: moderate  Location: abdomen    Axillary Web Syndrome/Cording:  Location: not formally assessed secondary to time constraints  Degree of Cording: N/A   Number of Cords Present: N/A    Sensation: WNL        Shoulder Range of Motion:   Active ROM Right Left   Flexion 140 140   Abduction 120 120   Extension 55 55   IR/90deg TBA TBA   ER/90deg TBA TBA          Upper Extremity Strength:   (R) UE (L) UE   Shoulder Flexion: 4/5 4/5   Shoulder Abduction: 4/5 4/5   Shoulder IR 3+/5 3+/5   Shoulder ER 3+/5 3+/5   Elbow Flexion: 4/5 4/5   Elbow Extension: 4/5 4/5   Wrist Flexion: 5/5 5/5   Wrist Extension: 5/5 5/5    39.6 lbs 46.2 lbs       Baseline Measurements of BUE's for Early Detection of Lymphedema:     LANDMARK RIGHT UE LEFT UE DIFFERENCE   E + 8" 29.5 cm 29 cm 0.5 cm   E + 6" 27.5 cm 27 cm 0.5 cm   E + 4" 26 cm 26 cm 0 cm   E + 2" 24 cm 23 cm 1 cm   Elbow 21.5 cm 22 cm 0.5 cm   W+ 8" 21.5 cm 21.5 cm 0 cm   W +  6" 20.5 cm 20 cm 0.5 cm   W + 4" 16 cm 16.5 cm 0.5 cm   Wrist 14.5 cm 14.5 cm 0 cm   DPC 16 cm 16 cm 0 cm   IP Thumb 5.5 cm 5 cm 0.5 cm     Functional Mobility (bed mobility, transfers): independent    ADL's: independent    Gait Assessment  - AD Used: none  - Assistance: independent  - Distance: community distances     Patient Education Provided     - Role of physical therapy in multi-disciplinary team  - Goals for physical therapy, scheduling  - Home " exercise program, exercise technique  - Energy conservation techniques      Patient has no cultural, educational, or language barriers to learning provided.    Treatment and Instruction of Home Exercise Program      Total Time Separate from Evaluation: 15 minutes    Trinity received the following therapeutic exercises to improve flexibility and range of motion for 5 minutes:     Issued and discussed introductory home exercise program and instructed patient to attempt exercises in pain-free range of motion. Home exercise program including the following passive and active assist range of motion:    - Butterfly stretch (hands on forehead)  - Wall slides (flexion, abduction)  - Doorway pec stretch       Trinity received the following self-care / home management education for 10 minutes:    - Lymphedema etiology and management plans.    - Written lymphedema risk reductions and precautions provided    Demonstrated scar massage technique and instructed patient to perform 1-2x/day when dressing in morning and/or evening. Patient verbalized understanding, agreement.     Also discussed discontinued range of motion post-operative precautions following OSBALDO drain removal. Therapist instructed patient to resume using BUEs for activities of daily living in comfortable, pain-free range. Patient verbalized understanding, agreement.       Written Home Exercises Provided: yes. Exercises were reviewed and Trinity was able to demonstrate them prior to the end of the session. Trinity demonstrated good  understanding of the education provided.     See EMR under Patient Instructions for exercises provided 3/6/2023.      Functional Limitations Reporting      CMS Impairment / Limitation / Restriction for FOTO Shoulder Survey    Therapist reviewed FOTO scores for Trinity Morin on 3/6/2023.   FOTO documents entered into [employer] - see Media section.    Limitations Score: 37%  Category: Carrying         Assessment   This is a 57 y.o. female  referred to outpatient physical therapy and presents with a medical diagnosis of LEFT breast cancer s/p BILATERAL mastectomy and JUNAID flap reconstruction and was seen today post-operatively to establish physical therapy plan of care for impairments following surgery including: limited bilateral upper extremity range of motion, decreased bilateral upper extremity strength, abnormal posture, increased risk for lymphedema, and limited tolerance to reaching / lifting / carrying / pushing / pulling activities of daily living.     Patient instructed in home exercise program this session and was able to perform all exercises given independently. Patient instructed to follow up with therapist if any concerns arise with program established. Patient will continue to benefit from skilled physical therapy to address the impairments stated in chart below, provide patient / family education, and to maximize patient's level of independence in home and community environment     Patient prognosis is Good.    Anticipated barriers to physical therapy:   - Unknown fatigue levels with upcoming chemotherapy     Patient's spiritual, cultural and educational needs considered, and patient agreeable to plan of care and goals as stated below:     Medical necessity is demonstrated by the following IMPAIRMENTS / PROBLEM LIST:    History  Co-morbidities and personal factors that may impact the plan of care Co-morbidities:   history of cancer    Personal Factors:   no deficits     low   Examination  Body Structures and Functions, activity limitations and participation restrictions that may impact the plan of care Body Regions:   upper extremities  trunk    Body Systems:    gross symmetry  ROM  strength  edema  skin integrity    Participation Restrictions:   None    Activity limitations:   Learning and applying knowledge  no deficits    General Tasks and Commands  no deficits    Communication  no deficits    Mobility  lifting and carrying  objects  using transportation (bus, train, plane, car)    Self care  washing oneself (bathing, drying, washing hands)  caring for body parts (brushing teeth, shaving, grooming)    Domestic Life  cooking  doing house work (cleaning house, washing dishes, laundry)    Interactions/Relationships  no deficits    Life Areas  no deficits    Community and Social Life  recreation and leisure         moderate   Clinical Presentation evolving clinical presentation with changing clinical characteristics moderate   Decision Making/ Complexity Score: moderate       Goals: Patient agrees with goals set  Short Term Goals: 4 Weeks  Patient will demonstrate 100% understanding of lymphedema risk reduction practices, including self-monitoring for lymphedema (progressing, not met).  Patient will demonstrate independence with established home exercise program for improved strength, functional mobility, range of motion, posture, and endurance. (progressing, not met).   Patient will increase BILATERAL shoulder FLEXION active range of motion to 160 degrees for improved independence with functional reaching, carrying, pushing, and pulling tasks (progressing, not met).   Patient will increase BILATERAL shoulder ABDUCTION active range of motion to 160 degrees for improved independence with functional reaching, carrying, pushing, and pulling tasks (progressing, not met).   Patient will increase gross upper extremity musculature to 4+/5 for improved independence with functional reaching, carrying, pushing, and pulling tasks (progressing, not met).     Long Term Goals: 8 Weeks   Patient will be independent with updated home exercise program for improved functional mobility, posture, strength, and endurance (progressing, not met).  Patient will increase BILATERAL shoulder FLEXION active range of motion to 180 degrees for improved independence with functional reaching, carrying, pushing, and pulling tasks (progressing, not met).   Patient will  increase BILATERAL shoulder ABDUCTION active range of motion to 180 degrees for improved independence with functional reaching, carrying, pushing, and pulling tasks (progressing, not met).   Patient will increase gross upper extremity musculature to 5/5 for improved independence with functional reaching, carrying, pushing, and pulling tasks (progressing, not met).   Patient will report decrease in overall worst pain to 2/10 at discharge for improved tolerance to functional reaching, carrying, pushing, and pulling activities of daily living (progressing, not met).  Patient will report compliance with walking program 5x/week for 10 minutes/day to improve overall cardiovascular function and decrease cancer-related fatigue at discharge (progressing, not met).       Plan   Outpatient physical therapy 2x week for 8 weeks to include the following: Gait Training, Manual Therapy, Neuromuscular Re-ed, Patient Education, Self Care, Therapeutic Activities, and Therapeutic Exercise.    Plan of Care Certification Period: 3/6/2023 to 05/05/2023.    Therapist: Genoveva Schmid, PT  3/6/2023

## 2023-03-09 ENCOUNTER — PATIENT MESSAGE (OUTPATIENT)
Dept: GYNECOLOGIC ONCOLOGY | Facility: CLINIC | Age: 58
End: 2023-03-09
Payer: OTHER GOVERNMENT

## 2023-03-09 ENCOUNTER — PATIENT MESSAGE (OUTPATIENT)
Dept: PLASTIC SURGERY | Facility: CLINIC | Age: 58
End: 2023-03-09
Payer: OTHER GOVERNMENT

## 2023-03-11 ENCOUNTER — PATIENT MESSAGE (OUTPATIENT)
Dept: PLASTIC SURGERY | Facility: CLINIC | Age: 58
End: 2023-03-11
Payer: OTHER GOVERNMENT

## 2023-03-13 ENCOUNTER — PATIENT MESSAGE (OUTPATIENT)
Dept: PLASTIC SURGERY | Facility: CLINIC | Age: 58
End: 2023-03-13
Payer: OTHER GOVERNMENT

## 2023-03-13 ENCOUNTER — TELEPHONE (OUTPATIENT)
Dept: PLASTIC SURGERY | Facility: CLINIC | Age: 58
End: 2023-03-13
Payer: OTHER GOVERNMENT

## 2023-03-13 NOTE — PROGRESS NOTES
"Called and spoke to pt about c/o itching on the abdomen and folds underneath both breasts-  Advised pt to continue A&D oint and to take antihistamine like allegra and zyrtec otc to combat the itching and use Benadryl at night since Benadryl is causing drowsiness and sleepiness during the day. Advised pt to stop other ointment used, sprecifically the itching cream since this "irritation and itching" has developed and worsened after application of these multiple creams- Pt scheduled 3/14 tomorrow to see Dr Curry and pt agreeable to come in tomorrow -Pt verbally understands; all questions and concerns answered and advised to call back for any worsening symptoms     "

## 2023-03-14 ENCOUNTER — CLINICAL SUPPORT (OUTPATIENT)
Dept: REHABILITATION | Facility: HOSPITAL | Age: 58
End: 2023-03-14
Payer: OTHER GOVERNMENT

## 2023-03-14 ENCOUNTER — OFFICE VISIT (OUTPATIENT)
Dept: PLASTIC SURGERY | Facility: CLINIC | Age: 58
End: 2023-03-14
Payer: OTHER GOVERNMENT

## 2023-03-14 ENCOUNTER — PATIENT MESSAGE (OUTPATIENT)
Dept: PLASTIC SURGERY | Facility: CLINIC | Age: 58
End: 2023-03-14

## 2023-03-14 ENCOUNTER — TELEPHONE (OUTPATIENT)
Dept: SPEECH THERAPY | Facility: HOSPITAL | Age: 58
End: 2023-03-14
Payer: OTHER GOVERNMENT

## 2023-03-14 VITALS — SYSTOLIC BLOOD PRESSURE: 130 MMHG | HEART RATE: 88 BPM | DIASTOLIC BLOOD PRESSURE: 86 MMHG

## 2023-03-14 DIAGNOSIS — R29.898 WEAKNESS OF BOTH UPPER EXTREMITIES: ICD-10-CM

## 2023-03-14 DIAGNOSIS — M25.612 DECREASED RANGE OF MOTION OF LEFT SHOULDER: ICD-10-CM

## 2023-03-14 DIAGNOSIS — M25.611 DECREASED RANGE OF MOTION OF RIGHT SHOULDER: ICD-10-CM

## 2023-03-14 DIAGNOSIS — Z17.1 MALIGNANT NEOPLASM OF UPPER-OUTER QUADRANT OF LEFT BREAST IN FEMALE, ESTROGEN RECEPTOR NEGATIVE: Primary | ICD-10-CM

## 2023-03-14 DIAGNOSIS — Z91.89 AT RISK FOR LYMPHEDEMA: ICD-10-CM

## 2023-03-14 DIAGNOSIS — R29.3 POOR POSTURE: ICD-10-CM

## 2023-03-14 DIAGNOSIS — C50.412 MALIGNANT NEOPLASM OF UPPER-OUTER QUADRANT OF LEFT BREAST IN FEMALE, ESTROGEN RECEPTOR NEGATIVE: Primary | ICD-10-CM

## 2023-03-14 PROCEDURE — 99024 POSTOP FOLLOW-UP VISIT: CPT | Mod: ,,, | Performed by: SURGERY

## 2023-03-14 PROCEDURE — 99213 OFFICE O/P EST LOW 20 MIN: CPT | Mod: PBBFAC | Performed by: SURGERY

## 2023-03-14 PROCEDURE — 97140 MANUAL THERAPY 1/> REGIONS: CPT

## 2023-03-14 PROCEDURE — 99999 PR PBB SHADOW E&M-EST. PATIENT-LVL III: ICD-10-PCS | Mod: PBBFAC,,, | Performed by: SURGERY

## 2023-03-14 PROCEDURE — 99999 PR PBB SHADOW E&M-EST. PATIENT-LVL III: CPT | Mod: PBBFAC,,, | Performed by: SURGERY

## 2023-03-14 PROCEDURE — 97110 THERAPEUTIC EXERCISES: CPT

## 2023-03-14 PROCEDURE — 99024 PR POST-OP FOLLOW-UP VISIT: ICD-10-PCS | Mod: ,,, | Performed by: SURGERY

## 2023-03-14 RX ORDER — HYDROCORTISONE 25 MG/G
OINTMENT TOPICAL 2 TIMES DAILY
Qty: 20 G | Refills: 0 | OUTPATIENT
Start: 2023-03-14 | End: 2023-03-14 | Stop reason: ALTCHOICE

## 2023-03-14 RX ORDER — HYDROCORTISONE 25 MG/G
OINTMENT TOPICAL 2 TIMES DAILY
Qty: 20 G | Refills: 0 | OUTPATIENT
Start: 2023-03-14 | End: 2023-03-14

## 2023-03-14 RX ORDER — HYDROCORTISONE 25 MG/G
CREAM TOPICAL 2 TIMES DAILY
Qty: 20 G | Refills: 0 | Status: SHIPPED | OUTPATIENT
Start: 2023-03-14

## 2023-03-14 RX ORDER — HYDROCORTISONE 25 MG/G
OINTMENT TOPICAL 2 TIMES DAILY
Qty: 20 G | OUTPATIENT
Start: 2023-03-14 | End: 2023-03-14 | Stop reason: ALTCHOICE

## 2023-03-14 NOTE — PROGRESS NOTES
57 year old female s/p bilateral JUNAID flap reconstruction on 2/8/23 with interval seroma of abdominal incision, drained last visit. She developed fullness on the right aspect of her incision. No pain, surrounding erythema, fever, chills or additional concerns.    Abdominal incision well healing, indurated area palpated along the right aspect of the incision. No surrounding erythema, induration, or pain.     Procedure Note  Bedside ultrasound was used to identify a pocket of subcutaneous fluid along the lower abdominal incision approximately 3cm deep to the surface. The skin was cleaned with alcohol prep. A butterfly needle was introduced into the pocket using ultrasound guidance. 10ccs of serosanguinous fluid was aspirated from the pocket. The patient tolerated this well. US confirmed resolution of the fluid collection. A Band-Aid was used to dress the drainage site.     Patient was advised to f/u in 2 weeks or contact the clinic sooner with any concerns in the interim.

## 2023-03-14 NOTE — PROGRESS NOTES
Physical Therapy Daily Treatment Note       Date: 3/14/2023   Name: Trinity Morin  Clinic Number: [id number]    Therapy Diagnosis:   Encounter Diagnoses   Name Primary?    Decreased range of motion of left shoulder     Decreased range of motion of right shoulder     Weakness of both upper extremities     At risk for lymphedema     Poor posture      Physician: Kim Jiménez MD    Physician Orders: PT Eval and Treat  Medical Diagnosis: C50.412,Z17.1 (ICD-10-CM) - Malignant neoplasm of upper-outer quadrant of left breast in female, estrogen receptor negative  Evaluation Date: 03/06/2023  Authorization Period Expiration: 07/29/2023  Plan of Care Certification Period: 05/05/2023  Visit # / Visits Authorized: 1 / 14 (plus eval)  Insurance: [email or web address] / [facility] OPTUM  FOTO: 1/3    Time In: 2:45 pm  Time Out: 3:40 pm  Total Billable Time: 55 minutes    Precautions: Standard and cancer      Subjective     Pt reports: haven't really had a chance to do HEP due to all the itching from an allergic reaction to a medication. She also had fluid drained again from her abdomen. She is not having any pain it just feels uncomfortable.  She was not compliant with home exercise program due to itching.  Response to previous treatment: no adverse events  Pain Scale: Trinity rates pain on a scale of 0/10 on VAS.   Pain location: chest/shoulders bilateral    Fatigue: ok  Functional change: reaching easier  Treatment: Chemo: adjuvant 4 cycles (Taxotere + Cytoxan), not yet started  Radiation: none  Hormone Therapy: none  Surgery date: 2/8/23      Objective     Objective Measures updated at progress report unless specified.     Shoulder Range of Motion: 3/14/23  Active ROM Right Left   Flexion 170 165   Abduction 170 170   Extension 55 55   IR/90deg 90 90   ER/90deg 90 90     Treatment     Trinity received individual therapeutic exercises to improve postural correction and  alignment, stretching and soft tissue mobility, and strengthening for 45 minutes including the following:     Pulleys flex/abd     2'/2'  Butterfly stretch (hands behind head)  1 set x 15 reps  Supine wand flexion    1 set x 15 reps  Scapula retraction    1 set x 15 reps  B shoulder ER, red band   1 set x 10 reps  Horizontal abd, red band   1 set x 10 reps  Wall slides (flexion, abduction)  1 set x 10 reps/each    Trinity received the following manual therapy techniques were performed to increased myofascial/soft tissue length, mobility and pliability, increase PROM, AROM and function as well as to decrease pain for 10 minutes  - pec side bending in ER  - Grade 1 & 2 mobs L shoulder       Home Exercise Program and Patient Education   Education provided re:  - role of PT in multi - disciplinary team, goals for PT  - progress towards goals   - compliance with HEP    Written Home Exercises Provided: yes.  Exercises were reviewed and Trinity was able to demonstrate them prior to the end of the session.  Trinity demonstrated good  understanding of the education provided.     See EMR under Patient Instructions for exercises provided 3/14/2023.    Pt has no cultural, educational or language barriers to learning provided.    Assessment     Patient is responding well to physical therapy. Patient was able to demonstrate active ROM of both shoulder WFL with minimal pulling at end range flexion and abduction. She was able to begin making progress towards her goals as she was able to perform all of today's exercises with no increase in symptoms prior to leaving the clinic. She required occasional cues for technique with resistance band exercises and to keep her exercises in a pain free range. Advance resistance exercises next visit. Will progress as tolerated.     Pt prognosis is Good. Pt will continue to benefit from skilled outpatient physical therapy to address the deficits listed in the problem list chart on initial  evaluation, provide pt/family education and to maximize pt's level of independence in the home and community environment.     Anticipated barriers to physical therapy:  Unknown fatigue levels with upcoming chemotherapy    Goals as follows:  Short Term Goals: 4 Weeks  Patient will demonstrate 100% understanding of lymphedema risk reduction practices, including self-monitoring for lymphedema (progressing, not met).  Patient will demonstrate independence with established home exercise program for improved strength, functional mobility, range of motion, posture, and endurance. (progressing, not met).   Patient will increase BILATERAL shoulder FLEXION active range of motion to 160 degrees for improved independence with functional reaching, carrying, pushing, and pulling tasks (progressing, not met).   Patient will increase BILATERAL shoulder ABDUCTION active range of motion to 160 degrees for improved independence with functional reaching, carrying, pushing, and pulling tasks (progressing, not met).   Patient will increase gross upper extremity musculature to 4+/5 for improved independence with functional reaching, carrying, pushing, and pulling tasks (progressing, not met).      Long Term Goals: 8 Weeks   Patient will be independent with updated home exercise program for improved functional mobility, posture, strength, and endurance (progressing, not met).  Patient will increase BILATERAL shoulder FLEXION active range of motion to 180 degrees for improved independence with functional reaching, carrying, pushing, and pulling tasks (progressing, not met).   Patient will increase BILATERAL shoulder ABDUCTION active range of motion to 180 degrees for improved independence with functional reaching, carrying, pushing, and pulling tasks (progressing, not met).   Patient will increase gross upper extremity musculature to 5/5 for improved independence with functional reaching, carrying, pushing, and pulling tasks (progressing,  not met).   Patient will report decrease in overall worst pain to 2/10 at discharge for improved tolerance to functional reaching, carrying, pushing, and pulling activities of daily living (progressing, not met).  Patient will report compliance with walking program 5x/week for 10 minutes/day to improve overall cardiovascular function and decrease cancer-related fatigue at discharge (progressing, not met).      Plan     Outpatient physical therapy 2x week for 8 weeks to include the following: Gait Training, Manual Therapy, Neuromuscular Re-ed, Patient Education, Self Care, Therapeutic Activities, and Therapeutic Exercise.     Plan of Care Certification Period: 3/6/2023 to 05/05/2023.    Therapist: Leonor Cook, PT  3/14/2023

## 2023-03-14 NOTE — PATIENT INSTRUCTIONS
Theraband shoulder external rotation    Hold the band out to the front with both hands, elbows at your sides and bent 90 degrees.     Stretch the band apart as far as you can without pain. Keep the elbows in contact with your ribs. Squeeze the shoulder blades together.   Then return to start position.  Do 10 reps per set. Do 3 sets per session. Do 1 sessions per day          ELASTIC BAND BILATERAL HORIZONTAL ABDUCTION    Start by holding an elastic band in front of your chest with your elbows straight. Then, pull your arms apart and towards the side. Return to starting position and repeat.   Do 10 reps per set. Do 3 sets per session. Do 1 session per day.

## 2023-03-21 ENCOUNTER — DOCUMENTATION ONLY (OUTPATIENT)
Dept: REHABILITATION | Facility: HOSPITAL | Age: 58
End: 2023-03-21
Payer: OTHER GOVERNMENT

## 2023-03-21 DIAGNOSIS — Z91.89 AT RISK FOR LYMPHEDEMA: ICD-10-CM

## 2023-03-21 DIAGNOSIS — R29.3 POOR POSTURE: ICD-10-CM

## 2023-03-21 DIAGNOSIS — R29.898 WEAKNESS OF BOTH UPPER EXTREMITIES: ICD-10-CM

## 2023-03-21 DIAGNOSIS — M25.612 DECREASED RANGE OF MOTION OF LEFT SHOULDER: Primary | ICD-10-CM

## 2023-03-21 DIAGNOSIS — M25.611 DECREASED RANGE OF MOTION OF RIGHT SHOULDER: ICD-10-CM

## 2023-03-21 NOTE — PROGRESS NOTES
No Show Note/Documentation    Patient: Trinity Morin  Date of Session: 3/21/2023  Diagnosis:   1. Decreased range of motion of left shoulder        2. Decreased range of motion of right shoulder        3. Weakness of both upper extremities        4. At risk for lymphedema        5. Poor posture          MRN: [id number]    Trinity Morin did not attend her scheduled therapy appointment today. She did not call to cancel nor reschedule. Next appointment is scheduled for 3/28/23 and will follow up with patient at that time. No charges have been posted today.     Cancel: 0  No show: 1    Leonor Cook, PT   3/21/2023

## 2023-03-23 ENCOUNTER — PATIENT MESSAGE (OUTPATIENT)
Dept: PLASTIC SURGERY | Facility: CLINIC | Age: 58
End: 2023-03-23
Payer: OTHER GOVERNMENT

## 2023-03-23 ENCOUNTER — PATIENT MESSAGE (OUTPATIENT)
Dept: GYNECOLOGIC ONCOLOGY | Facility: CLINIC | Age: 58
End: 2023-03-23
Payer: OTHER GOVERNMENT

## 2023-03-23 ENCOUNTER — PATIENT MESSAGE (OUTPATIENT)
Dept: HEMATOLOGY/ONCOLOGY | Facility: CLINIC | Age: 58
End: 2023-03-23
Payer: OTHER GOVERNMENT

## 2023-03-24 ENCOUNTER — PATIENT MESSAGE (OUTPATIENT)
Dept: PLASTIC SURGERY | Facility: CLINIC | Age: 58
End: 2023-03-24
Payer: OTHER GOVERNMENT

## 2023-03-24 ENCOUNTER — TELEPHONE (OUTPATIENT)
Dept: GYNECOLOGIC ONCOLOGY | Facility: CLINIC | Age: 58
End: 2023-03-24
Payer: OTHER GOVERNMENT

## 2023-03-24 NOTE — TELEPHONE ENCOUNTER
----- Message from Tian Berry RN sent at 3/24/2023  2:57 PM CDT -----    ----- Message -----  From: Alvarado Balderas  Sent: 3/24/2023   2:33 PM CDT  To: Zaira Bonilla Staff    Name Of Caller: Trinity        Provider Name: Chad Meneses        Does patient feel the need to be seen today? no        Relationship to the Pt?: patient        Contact Preference?: [facility]        What is the nature of the call?: Patient states that she would like to speak with someone in the office in regards to getting an appointment scheduled to discuss her getting surgery.

## 2023-03-28 ENCOUNTER — CLINICAL SUPPORT (OUTPATIENT)
Dept: REHABILITATION | Facility: HOSPITAL | Age: 58
End: 2023-03-28
Payer: OTHER GOVERNMENT

## 2023-03-28 DIAGNOSIS — M25.612 DECREASED RANGE OF MOTION OF LEFT SHOULDER: Primary | ICD-10-CM

## 2023-03-28 DIAGNOSIS — M25.611 DECREASED RANGE OF MOTION OF RIGHT SHOULDER: ICD-10-CM

## 2023-03-28 DIAGNOSIS — Z91.89 AT RISK FOR LYMPHEDEMA: ICD-10-CM

## 2023-03-28 DIAGNOSIS — R29.898 WEAKNESS OF BOTH UPPER EXTREMITIES: ICD-10-CM

## 2023-03-28 DIAGNOSIS — R29.3 POOR POSTURE: ICD-10-CM

## 2023-03-28 PROCEDURE — 97140 MANUAL THERAPY 1/> REGIONS: CPT

## 2023-03-28 PROCEDURE — 97112 NEUROMUSCULAR REEDUCATION: CPT

## 2023-03-28 PROCEDURE — 97110 THERAPEUTIC EXERCISES: CPT

## 2023-03-28 NOTE — PROGRESS NOTES
Physical Therapy Daily Treatment Note     Date: 3/28/2023   Name: Trinity Morni  Clinic Number: [id number]    Therapy Diagnosis:   Encounter Diagnoses   Name Primary?    Decreased range of motion of left shoulder Yes    Decreased range of motion of right shoulder     Weakness of both upper extremities     At risk for lymphedema     Poor posture      Physician: Kim Jiménez MD    Physician Orders: PT Eval and Treat  Medical Diagnosis: C50.412,Z17.1 (ICD-10-CM) - Malignant neoplasm of upper-outer quadrant of left breast in female, estrogen receptor negative  Evaluation Date: 03/06/2023  Authorization Period Expiration: 07/29/2023  Plan of Care Certification Period: 05/05/2023  Visit # / Visits Authorized: 2 / 14 (plus eval)  Insurance: [employer] / [facility] OPTUM  FOTO: 1/3    Time In: 2:11 PM (late arrival)  Time Out: 3:00 PM  Total Billable Time: 49 minutes    Precautions: Standard and cancer      Subjective     Patient reports: shoulders feeling looser, but trunk still feels tender near incisions. Patient reports significant improved itchiness. Patient hoping to schedule revision surgery in June-July.    She was compliant with home exercise program due to itching.  Response to Previous Treatment: no adverse events    Pain Scale: Trinity rates pain on a scale of 0/10 on VAS.   Pain Location: N/A    Fatigue: ok  Functional change: less difficulty with overhead reaching    Treatment:  Chemo: adjuvant 4 cycles (Taxotere + Cytoxan), not yet started  Radiation: none  Hormone Therapy: none    Surgery Date: 2/8/23      Objective     Objective Measures updated at progress report unless specified.     Shoulder Range of Motion: 03/14/2023  Active ROM Right Left   Flexion 170 165   Abduction 170 170   Extension 55 55   IR/90deg 90 90   ER/90deg 90 90     Treatment     Trinity received individual therapeutic exercises to improve postural correction and  alignment, stretching and soft tissue mobility, and strengthening for 20 minutes including the following:     Seated Exercises:   - Pulleys (flexion, abduction)   2 min each    Standing Exercise:   - Wall slides (flexion, abduction)  1 set x 10 reps/each  - Bicep curls (3#)    2 set x 10 reps    Mat Exercises:   - Butterfly stretch with LTR (behind head) 2 min  - Supine wand flex with bench press (1#) 1 set x 15 reps      Trinity received the following individual neuromuscular re-education activities to improve coordination, kinesthetic sense, proprioception, and posture for 15 minutes:     - Bilateral shoulder ER (red)   2 set x 10 reps  - Horizontal abd (red)    2 set x 10 reps  - PNF D2 flexion (red)    1 set x 10 reps (delphine)  - Shoulder rows (red)    2 set x 10 reps      Titoteo received the following manual therapy techniques were performed to increased myofascial/soft tissue length, mobility and pliability, increase PROM, AROM and function as well as to decrease pain for 15 minutes:    - Pec side bending in ER      Home Exercise Program and Patient Education     Education Provided Regarding:  - Role of PT in multi - disciplinary team  - Goals for physical therapy, progress towards goals   - Compliance with home exercise program, exercise technique    Written Home Exercises Provided: Patient instructed to cont prior HEP. Exercises were reviewed and Trinity was able to demonstrate them prior to the end of the session. Trinity demonstrated good  understanding of the education provided.     See EMR under Patient Instructions for exercises provided 3/14/2023.    Patient has no cultural, educational, or language barriers to learning provided.    Assessment     Patient is responding well to physical therapy. Patient able to perform new exercises and exercise progressions without increase in symptoms prior to leaving the clinic. Improved scapular stabilization with added resistance band exercises (shoulder rows,  PNF D2 flexion). Demonstration for proper shoulder rows technique to avoid upper trapezius compensation (good carryover). Improved strength with added biceps curls. Will progress as tolerated.     Patient prognosis is Good. Patient will continue to benefit from skilled outpatient physical therapy to address the deficits listed in the problem list chart on initial evaluation, provide patient / family education, and to maximize patient's level of independence in the home and community environment.     Anticipated barriers to physical therapy: unknown fatigue levels with upcoming chemotherapy    Goals as follows:  Short Term Goals: 4 Weeks  Patient will demonstrate 100% understanding of lymphedema risk reduction practices, including self-monitoring for lymphedema (progressing, not met).  Patient will demonstrate independence with established home exercise program for improved strength, functional mobility, range of motion, posture, and endurance. (progressing, not met).   Patient will increase BILATERAL shoulder FLEXION active range of motion to 160 degrees for improved independence with functional reaching, carrying, pushing, and pulling tasks (progressing, not met).   Patient will increase BILATERAL shoulder ABDUCTION active range of motion to 160 degrees for improved independence with functional reaching, carrying, pushing, and pulling tasks (progressing, not met).   Patient will increase gross upper extremity musculature to 4+/5 for improved independence with functional reaching, carrying, pushing, and pulling tasks (progressing, not met).      Long Term Goals: 8 Weeks   Patient will be independent with updated home exercise program for improved functional mobility, posture, strength, and endurance (progressing, not met).  Patient will increase BILATERAL shoulder FLEXION active range of motion to 180 degrees for improved independence with functional reaching, carrying, pushing, and pulling tasks (progressing, not  met).   Patient will increase BILATERAL shoulder ABDUCTION active range of motion to 180 degrees for improved independence with functional reaching, carrying, pushing, and pulling tasks (progressing, not met).   Patient will increase gross upper extremity musculature to 5/5 for improved independence with functional reaching, carrying, pushing, and pulling tasks (progressing, not met).   Patient will report decrease in overall worst pain to 2/10 at discharge for improved tolerance to functional reaching, carrying, pushing, and pulling activities of daily living (progressing, not met).  Patient will report compliance with walking program 5x/week for 10 minutes/day to improve overall cardiovascular function and decrease cancer-related fatigue at discharge (progressing, not met).      Plan     Outpatient physical therapy 2x week for 8 weeks to include the following: Gait Training, Manual Therapy, Neuromuscular Re-ed, Patient Education, Self Care, Therapeutic Activities, and Therapeutic Exercise.     Plan of Care Certification Period: 03/6/2023 to 05/05/2023.    Therapist: Genoveva Schmid, PT  3/28/2023

## 2023-04-05 ENCOUNTER — TELEPHONE (OUTPATIENT)
Dept: HEMATOLOGY/ONCOLOGY | Facility: CLINIC | Age: 58
End: 2023-04-05
Payer: OTHER GOVERNMENT

## 2023-04-06 ENCOUNTER — OFFICE VISIT (OUTPATIENT)
Dept: OTOLARYNGOLOGY | Facility: CLINIC | Age: 58
End: 2023-04-06
Payer: OTHER GOVERNMENT

## 2023-04-06 VITALS
BODY MASS INDEX: 28.48 KG/M2 | DIASTOLIC BLOOD PRESSURE: 79 MMHG | WEIGHT: 141 LBS | HEART RATE: 67 BPM | SYSTOLIC BLOOD PRESSURE: 139 MMHG

## 2023-04-06 DIAGNOSIS — R49.0 DYSPHONIA: ICD-10-CM

## 2023-04-06 PROCEDURE — 99213 OFFICE O/P EST LOW 20 MIN: CPT | Mod: S$PBB,,, | Performed by: NURSE PRACTITIONER

## 2023-04-06 PROCEDURE — 99999 PR PBB SHADOW E&M-EST. PATIENT-LVL III: CPT | Mod: PBBFAC,,, | Performed by: NURSE PRACTITIONER

## 2023-04-06 PROCEDURE — 99999 PR PBB SHADOW E&M-EST. PATIENT-LVL III: ICD-10-PCS | Mod: PBBFAC,,, | Performed by: NURSE PRACTITIONER

## 2023-04-06 PROCEDURE — 99213 OFFICE O/P EST LOW 20 MIN: CPT | Mod: PBBFAC | Performed by: NURSE PRACTITIONER

## 2023-04-06 PROCEDURE — 99213 PR OFFICE/OUTPT VISIT, EST, LEVL III, 20-29 MIN: ICD-10-PCS | Mod: S$PBB,,, | Performed by: NURSE PRACTITIONER

## 2023-04-06 NOTE — ASSESSMENT & PLAN NOTE
Discussed that there are no surgical options at this time. She should proceed with voice therapy as discussed with Dr. Vazquez at last months visit. Questions answered.

## 2023-04-06 NOTE — PROGRESS NOTES
[facility]  Department of Otorhinolaryngology and Communication Sciences    Subjective:      Trinity Morin is a 57 y.o. female who presents for follow-up. She would like to further discuss her treatment options. She was seen by Dr. Vazquez 1 month ago.    Objective:     /79 (Patient Position: Sitting)   Pulse 67   Wt 64 kg (141 lb)   BMI 28.48 kg/m²    Constitutional: comfortable, well dressed  Psychiatric: appropriate affect  Respiratory: comfortably breathing, symmetric chest rise, no stridor  Voice:  Mild variable roughness/hardy; pitch break in upper register  Head: normocephalic  Eyes: conjunctivae and sclerae clear  Indirect laryngoscopy is limited due to gag    Procedure- Deferred today  Flexible Laryngeal Videostroboscopy (62968): Laryngeal videostroboscopy is indicated to assess laryngeal vibratory biomechanics and vocal fold oscillation, which cannot be assessed with a plain light examination. This was carried out transnasally with a distal chip videoendoscope. After verbal consent was obtained, the patient was positioned and the nose was topically decongested with 1% phenylephrine and topically anesthetized with 4% lidocaine. The endoscope was passed through the most patent nasal cavity and positioned to image the nasopharynx, larynx, and hypopharynx in detail. The following features were examined: nasopharyngeal, laryngeal, hypopharyngeal masses; velopharyngeal strength, closure, and symmetry of motion; vocal fold range and symmetry of motion; laryngeal mucosal edema, erythema, inflammation, and hydration; salivary pooling; and gross laryngeal sensation. During phonation, the vocal folds were assessed for glottal closure; mucosal wave; vocal fold lesions; vibratory periodicity, amplitude, and phase symmetry; and vertical height match. The equipment was removed. The patient tolerated the procedure well without complication. All findings were normal except:  - left vocal fold with  sessile convexity along free edge, middle third  - smaller convexity along free edge of right vocal fold, middle third  - premature contact, hourglass closure, pliability intact  - mild insufficiency with A/P gap in uppermost register      Assessment:     Trinity Morin is a 57 y.o. 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     Plan:     Problem List Items Addressed This Visit          ENT    Dysphonia     Discussed that there are no surgical options at this time. She should proceed with voice therapy as discussed with Dr. Vazquez at last months visit. Questions answered.

## 2023-04-10 ENCOUNTER — TELEPHONE (OUTPATIENT)
Dept: SPEECH THERAPY | Facility: HOSPITAL | Age: 58
End: 2023-04-10
Payer: OTHER GOVERNMENT

## 2023-04-10 DIAGNOSIS — R49.0 DYSPHONIA: Primary | ICD-10-CM

## 2023-04-10 DIAGNOSIS — J38.5 LARYNGEAL SPASM: ICD-10-CM

## 2023-04-10 DIAGNOSIS — J38.2 VOCAL FOLD NODULES: ICD-10-CM

## 2023-04-11 ENCOUNTER — DOCUMENTATION ONLY (OUTPATIENT)
Dept: REHABILITATION | Facility: HOSPITAL | Age: 58
End: 2023-04-11
Payer: OTHER GOVERNMENT

## 2023-04-11 NOTE — PROGRESS NOTES
No Show Note/Documentation    Patient: Trinity Morin  Date of Session: 4/11/2023  Diagnosis: No diagnosis found.  MRN: [id number]    Trinity Morin did not attend her scheduled therapy appointment today. She did not call to cancel nor reschedule. Next appointment is scheduled for 04/18/2023 at 1:00 PM and will follow up with patient at that time. No charges have been posted today.     Genoveva Schmid PT, DPT  4/11/2023

## 2023-04-13 ENCOUNTER — TELEPHONE (OUTPATIENT)
Dept: REHABILITATION | Facility: HOSPITAL | Age: 58
End: 2023-04-13
Payer: OTHER GOVERNMENT

## 2023-04-13 NOTE — TELEPHONE ENCOUNTER
Left voicemail with callback number. Requested patient to update therapist on current status to determine if patient requires additional physical therapy appointments.     No physical therapy charges posted on this date.     Genoveva Schmid PT, DPT  04/13/2023

## 2023-04-14 ENCOUNTER — PATIENT MESSAGE (OUTPATIENT)
Dept: PLASTIC SURGERY | Facility: CLINIC | Age: 58
End: 2023-04-14
Payer: OTHER GOVERNMENT

## 2023-04-18 ENCOUNTER — TELEPHONE (OUTPATIENT)
Dept: GYNECOLOGIC ONCOLOGY | Facility: CLINIC | Age: 58
End: 2023-04-18
Payer: OTHER GOVERNMENT

## 2023-04-18 ENCOUNTER — DOCUMENTATION ONLY (OUTPATIENT)
Dept: REHABILITATION | Facility: HOSPITAL | Age: 58
End: 2023-04-18
Payer: OTHER GOVERNMENT

## 2023-04-18 NOTE — TELEPHONE ENCOUNTER
Return call no answer. ----- Message from Tian Berry RN sent at 4/17/2023  4:10 PM CDT -----  Next available at [facility].  ----- Message -----  From: Rafat He  Sent: 4/17/2023   3:56 PM CDT  To: Zaira Bonilla Staff    Name of Who is Calling: LUPILLO CASTILLO [[id number]]           What is the request in detail: Patient is requesting a call back.           Can the clinic reply by [facility]: No           [facility] [phone]

## 2023-04-18 NOTE — PROGRESS NOTES
No Show Note/Documentation    Patient: Trinity Morin  Date of Session: 4/18/2023  Diagnosis: No diagnosis found.  MRN: [id number]    Trinity Morin did not attend her scheduled therapy appointment today. She did not call to cancel nor reschedule. Next appointment is scheduled for Tuesday, 04/25/2023 at 1:00 PM and will follow up with patient at that time. No charges have been posted today.     Genoveva Schmid PT, DPT  4/18/2023

## 2023-04-25 ENCOUNTER — DOCUMENTATION ONLY (OUTPATIENT)
Dept: REHABILITATION | Facility: HOSPITAL | Age: 58
End: 2023-04-25
Payer: OTHER GOVERNMENT

## 2023-04-25 NOTE — PROGRESS NOTES
No Show Note/Documentation    Patient: Trinity Morin  Date of Session: 4/25/2023  Diagnosis: No diagnosis found.  MRN: [id number]    Trinity oMrin did not attend her scheduled therapy appointment today. She did not call to cancel nor reschedule. Patient does not have follow-up appointment scheduled at this time. No charges have been posted today.     Genoveva Schmid  4/25/2023

## 2023-04-26 ENCOUNTER — PATIENT MESSAGE (OUTPATIENT)
Dept: PLASTIC SURGERY | Facility: CLINIC | Age: 58
End: 2023-04-26
Payer: OTHER GOVERNMENT

## 2023-05-02 ENCOUNTER — PATIENT MESSAGE (OUTPATIENT)
Dept: PLASTIC SURGERY | Facility: CLINIC | Age: 58
End: 2023-05-02
Payer: OTHER GOVERNMENT

## 2023-05-04 ENCOUNTER — PATIENT MESSAGE (OUTPATIENT)
Dept: REHABILITATION | Facility: HOSPITAL | Age: 58
End: 2023-05-04
Payer: OTHER GOVERNMENT

## 2023-05-05 ENCOUNTER — PATIENT MESSAGE (OUTPATIENT)
Dept: PLASTIC SURGERY | Facility: CLINIC | Age: 58
End: 2023-05-05
Payer: OTHER GOVERNMENT

## 2023-05-09 ENCOUNTER — PATIENT MESSAGE (OUTPATIENT)
Dept: RESEARCH | Facility: HOSPITAL | Age: 58
End: 2023-05-09
Payer: OTHER GOVERNMENT

## 2023-05-15 ENCOUNTER — PATIENT MESSAGE (OUTPATIENT)
Dept: REHABILITATION | Facility: HOSPITAL | Age: 58
End: 2023-05-15
Payer: OTHER GOVERNMENT

## 2023-05-16 ENCOUNTER — TELEPHONE (OUTPATIENT)
Dept: SPEECH THERAPY | Facility: HOSPITAL | Age: 58
End: 2023-05-16
Payer: OTHER GOVERNMENT

## 2023-05-17 ENCOUNTER — PATIENT MESSAGE (OUTPATIENT)
Dept: SPEECH THERAPY | Facility: HOSPITAL | Age: 58
End: 2023-05-17
Payer: OTHER GOVERNMENT

## 2023-05-17 ENCOUNTER — TELEPHONE (OUTPATIENT)
Dept: SPEECH THERAPY | Facility: HOSPITAL | Age: 58
End: 2023-05-17
Payer: OTHER GOVERNMENT

## 2023-05-17 NOTE — PROGRESS NOTES
Physical Therapy Daily Treatment Note     Date: 5/18/2023   Name: Trinity Morin  Clinic Number: [id number]    Therapy Diagnosis:   No diagnosis found.    Physician: Kim Jiménez MD    Physician Orders: PT Eval and Treat  Medical Diagnosis: C50.412,Z17.1 (ICD-10-CM) - Malignant neoplasm of upper-outer quadrant of left breast in female, estrogen receptor negative  Evaluation Date: 03/06/2023  Authorization Period Expiration: 07/29/2023  Plan of Care Certification Period: 05/18/2023 to 07/14/2023  Visit # / Visits Authorized: 3 / 14 (plus eval)  Insurance: [employer] / [facility] [employer]UM  FOTO: 2/3    Time In: *** PM  Time Out: *** PM  Total Billable Time: *** minutes    Precautions: Standard and cancer      Subjective     Patient reports: ***    She *** compliant with home exercise program.  Response to Previous Treatment: ***    Pain Scale: Trinity rates pain on a scale of ***/10 on VAS.   Pain Location: ***    Fatigue: ***  Functional Change: ***    Diagnosis: Stage IB (pT1b, pN0(sn), cM0, G3, ER-, [place]-, HER2-) IDC of LEFT breast    Treatment:  Chemo: adjuvant 4 cycles (Taxotere + Cytoxan), not yet started  Radiation: none  Hormone Therapy: none    Surgery Date: BILATERAL mastectomy with SLNB (2) with immediate JUNAID flap reconstruction on 02/08/2023 per Viola Jiménez and Ptaricio.       Objective     Objective Measures updated at progress report unless specified.     Shoulder Range of Motion: 05/18/2023  Active ROM Right Left   Flexion *** ***   Abduction *** ***   Extension *** ***   IR/90deg *** ***   ER/90deg *** ***     Upper Extremity Strength: 05/18/2023    (R) UE (L) UE   Shoulder Flexion: ***/5 ***/5   Shoulder Abduction: ***/5 ***/5   Shoulder IR ***/5 ***/5   Shoulder ER ***/5 ***/5   Elbow Flexion: ***/5 ***/5   Elbow Extension: ***/5 ***/5   Wrist Flexion: 5/5 5/5   Wrist Extension: 5/5 5/5    *** lbs *** lbs         Circumferential BUE  "Measurements of BUEs: 05/18/2023   LANDMARK RIGHT UE LEFT UE DIFFERENCE   E + 8" *** cm *** cm *** cm   E + 6" *** cm *** cm *** cm   E + 4" *** cm *** cm *** cm   E + 2" *** cm *** cm *** cm   Elbow *** cm *** cm *** cm   W+ 8" *** cm *** cm *** cm   W +  6" *** cm *** cm *** cm   W + 4" *** cm *** cm *** cm   Wrist *** cm *** cm *** cm   DPC *** cm *** cm *** cm   IP Thumb *** cm *** cm *** cm       Functional Limitations Reporting       CMS Impairment / Limitation / Restriction for FOTO Shoulder Survey     Therapist reviewed FOTO scores for Trinity Morin on 05/18/2023.   FOTO documents entered into [employer] - see Media section.     Limitations Score: ***%  Category: ***             Treatment     Trinity received individual therapeutic exercises to improve postural correction and alignment, stretching and soft tissue mobility, and strengthening for 20 minutes including the following:     Seated Exercises:   - Pulleys (flexion, abduction)   2 min each    Standing Exercise:   - Wall slides (flexion, abduction)  1 set x 10 reps/each  - Bicep curls (3#)    2 set x 10 reps    Mat Exercises:   - Butterfly stretch with LTR (behind head) 2 min  - Supine wand flex with bench press (1#) 1 set x 15 reps      Trinity received the following individual neuromuscular re-education activities to improve coordination, kinesthetic sense, proprioception, and posture for 15 minutes:     - Bilateral shoulder ER (red)   2 set x 10 reps  - Horizontal abd (red)    2 set x 10 reps  - PNF D2 flexion (red)    1 set x 10 reps (delphine)  - Shoulder rows (red)    2 set x 10 reps      Trinity received the following manual therapy techniques were performed to increased myofascial/soft tissue length, mobility and pliability, increase PROM, AROM and function as well as to decrease pain for 15 minutes:    - Pec side bending in ER      Home Exercise Program and Patient Education     Education Provided Regarding:  - Role of PT in multi - disciplinary " team  - Goals for physical therapy, progress towards goals   - Compliance with home exercise program, exercise technique    Written Home Exercises Provided: Patient instructed to cont prior HEP. Exercises were reviewed and Trinity was able to demonstrate them prior to the end of the session. Trinity demonstrated good  understanding of the education provided.     See EMR under Patient Instructions for exercises provided 3/14/2023.    Patient has no cultural, educational, or language barriers to learning provided.    Assessment     Patient is responding well to physical therapy. Patient able to perform new exercises and exercise progressions without increase in symptoms prior to leaving the clinic. Patient demonstrates the following improvements in bilateral upper extremity active range of motion since initial evaluation (03/06/2023):    - ***    *** Will progress as tolerated.     Patient prognosis is Good. Patient will continue to benefit from skilled outpatient physical therapy to address the deficits listed in the problem list chart on initial evaluation, provide patient / family education, and to maximize patient's level of independence in the home and community environment.     Anticipated barriers to physical therapy: unknown fatigue levels with upcoming chemotherapy    Goals as follows:  Short Term Goals: 4 Weeks  Patient will demonstrate 100% understanding of lymphedema risk reduction practices, including self-monitoring for lymphedema (progressing, not met).  Patient will demonstrate independence with established home exercise program for improved strength, functional mobility, range of motion, posture, and endurance. (progressing, not met).   Patient will increase BILATERAL shoulder FLEXION active range of motion to 160 degrees for improved independence with functional reaching, carrying, pushing, and pulling tasks (progressing, not met).   Patient will increase BILATERAL shoulder ABDUCTION active range  of motion to 160 degrees for improved independence with functional reaching, carrying, pushing, and pulling tasks (progressing, not met).   Patient will increase gross upper extremity musculature to 4+/5 for improved independence with functional reaching, carrying, pushing, and pulling tasks (progressing, not met).      Long Term Goals: 8 Weeks   Patient will be independent with updated home exercise program for improved functional mobility, posture, strength, and endurance (progressing, not met).  Patient will increase BILATERAL shoulder FLEXION active range of motion to 180 degrees for improved independence with functional reaching, carrying, pushing, and pulling tasks (progressing, not met).   Patient will increase BILATERAL shoulder ABDUCTION active range of motion to 180 degrees for improved independence with functional reaching, carrying, pushing, and pulling tasks (progressing, not met).   Patient will increase gross upper extremity musculature to 5/5 for improved independence with functional reaching, carrying, pushing, and pulling tasks (progressing, not met).   Patient will report decrease in overall worst pain to 2/10 at discharge for improved tolerance to functional reaching, carrying, pushing, and pulling activities of daily living (progressing, not met).  Patient will report compliance with walking program 5x/week for 10 minutes/day to improve overall cardiovascular function and decrease cancer-related fatigue at discharge (progressing, not met).      Plan     Outpatient physical therapy 2x week for 8 weeks to include the following: Gait Training, Manual Therapy, Neuromuscular Re-ed, Patient Education, Self Care, Therapeutic Activities, and Therapeutic Exercise.     Plan of Care Certification Period: 05/18/2023 to 07/14/2023.    Therapist: Genoveva Schmid, PT  5/18/2023

## 2023-05-18 ENCOUNTER — CLINICAL SUPPORT (OUTPATIENT)
Dept: REHABILITATION | Facility: HOSPITAL | Age: 58
End: 2023-05-18
Payer: OTHER GOVERNMENT

## 2023-05-18 DIAGNOSIS — M25.611 DECREASED RANGE OF MOTION OF RIGHT SHOULDER: ICD-10-CM

## 2023-05-18 DIAGNOSIS — R29.3 POOR POSTURE: ICD-10-CM

## 2023-05-18 DIAGNOSIS — R29.898 WEAKNESS OF BOTH UPPER EXTREMITIES: ICD-10-CM

## 2023-05-18 DIAGNOSIS — Z91.89 AT RISK FOR LYMPHEDEMA: ICD-10-CM

## 2023-05-18 DIAGNOSIS — M25.612 DECREASED RANGE OF MOTION OF LEFT SHOULDER: Primary | ICD-10-CM

## 2023-05-18 PROCEDURE — 97112 NEUROMUSCULAR REEDUCATION: CPT

## 2023-05-18 PROCEDURE — 97750 PHYSICAL PERFORMANCE TEST: CPT

## 2023-05-18 PROCEDURE — 97110 THERAPEUTIC EXERCISES: CPT

## 2023-05-18 NOTE — PATIENT INSTRUCTIONS
Cervical Towel for Sleeping Posture    Place a rolled up towel inside of pillowcase to maintain neck support at night.    Use a larger roll if side sleeping.        Seated Posture with Lumbar Roll    Place the lumbar roll as shown in the picture on the left.  Slide your bottom to the back of the chair and sit up straight so the roll provides support in the natural curve of your lower back.  Keep your shoulders back and head in a neutral position.  Maintain this position at all time when sitting.        Seated Row    Twist band around feet.  Hold handles by knees, engage core and bring hands towards chest squeezing shoulder blades together.            prayer stretch    - Begin seated in a chair, with good posture, core tight.  Place a large physioball in between your legs.  Place hands on physioball and slowly bend forward as far as you can, allowing the ball to roll forward with you.  Hold for the desired amount of time and roll back up into starting position. Now, repeat, but instead of rolling forward, roll towards the left (about 45 degrees) so you feel a stretch in the RIGHT side of the back. Now, repeat, but instead of rolling forward, roll towards the right (about 45 degrees) so you feel a stretch in the LEFT side of the back.  Do 10 reps in each direction.

## 2023-05-18 NOTE — PROGRESS NOTES
Physical Therapy Daily Treatment Note     Date: 5/18/2023   Name: Trinity Morin  Clinic Number: [id number]    Therapy Diagnosis:   Encounter Diagnoses   Name Primary?    Decreased range of motion of left shoulder Yes    Decreased range of motion of right shoulder     Weakness of both upper extremities     At risk for lymphedema     Poor posture        Physician: Kim Jiménez MD    Physician Orders: PT Eval and Treat  Medical Diagnosis: C50.412,Z17.1 (ICD-10-CM) - Malignant neoplasm of upper-outer quadrant of left breast in female, estrogen receptor negative  Evaluation Date: 03/06/2023  Authorization Period Expiration: 07/29/2023  Plan of Care Certification Period: 05/18/2023-07/14/2023  Visit # / Visits Authorized: 3 / 14 (plus eval)  Insurance: [employer] / [facility] [employer]  FOTO: 2/3    Time In: 4:22 pm  Time Out: 5:11 pm  Total Billable Time: 49 minutes    Precautions: Standard and cancer      Subjective     Patient reports: her shoulders and abs are feeling tight today. She has only be able to do her HEP 1x a week due to having to deal with lots of family issues. She is still having trouble reaching up into the cabinets and she would like to continue with therapy for a while.      She was partially compliant with home exercise program  Response to Previous Treatment: felt ok    Pain Scale: Trinity rates pain on a scale of 2/10 on VAS.   Pain Location: shoulders, neck    Fatigue: horrible  Functional change: less difficulty with overhead reaching    Treatment:  Chemo: adjuvant 4 cycles (Taxotere + Cytoxan), not yet started  Radiation: none  Hormone Therapy: none    Surgery Date: 2/8/23      Objective     Objective Measures updated at progress report unless specified.     Shoulder Range of Motion:   Active ROM Right Left   Flexion 170 165   Abduction 155 160   Extension 60 60   IR/90deg 90 75   ER/90deg 100 90           Upper Extremity Strength:     (R) UE (L) UE   Shoulder Flexion: 4/5* 4/5*   Shoulder Abduction: 4-/5 4-/5   Shoulder IR 4/5 4/5   Shoulder ER 4/5 4/5   Elbow Flexion: 4/5 4/5   Elbow Extension: 4-/5^ 4-/5^   Wrist Flexion: 5/5 5/5   Wrist Extension: 5/5 5/5    39.9 lbs 44.3 lbs    * neck pain   ^abdominal pain    CMS Impairment / Limitation / Restriction for FOTO Shoulder Survey     Therapist reviewed FOTO scores for Trinity Morin on 5/18/2023.   FOTO documents entered into [employer] - see Media section.     Limitations Score: 40%  Category: Carrying     Treatment     Trinity received a physical performance test with report x 15 minutes:  See objective section for measurements.     Trinity received individual therapeutic exercises to improve postural correction and alignment, stretching and soft tissue mobility, and strengthening for 19 minutes including the following:     Seated Exercises:   - Pulleys (flexion, abduction)   2 min each    Standing Exercise:   - Wall slides (flexion, abduction)  1 set x 10 reps/each    Mat Exercises:   - Butterfly stretch with LTR (behind head) 2 min      Trinity received the following individual neuromuscular re-education activities to improve coordination, kinesthetic sense, proprioception, and posture for 15 minutes:     - Bilateral shoulder ER (green)  2 set x 10 reps  - Horizontal abd (green)   2 set x 10 reps  - PNF D2 flexion (green)   1 set x 10 reps (delphine)  - Shoulder rows (green)   2 set x 10 reps      Home Exercise Program and Patient Education     Education Provided Regarding:  - Role of PT in multi - disciplinary team  - Goals for physical therapy, progress towards goals   - Compliance with home exercise program, exercise technique  - Use of a cervical roll for sleeping posture and a lumbar roll for sitting posture    Written Home Exercises Provided: Patient instructed to cont prior HEP. Exercises were reviewed and Trinity was able to demonstrate them prior to the end of the session. Trinity  demonstrated good  understanding of the education provided.     See EMR under Patient Instructions for exercises provided 3/14/2023.    Patient has no cultural, educational, or language barriers to learning provided.    Assessment     Patient was able to demonstrate an increase in B UE AROM compared to her initial evaluation but reports continuing to limit difficulty with overhead activities. Her B UE strength has improved but also continues to limit her ability to perform her usual ADLs. She would benefit from continued physical therapy to improve her ROM, strength, tissue mobility, and functional mobility. She was able to perform all of today's progressions with no increase in symptoms prior to leaving the clinic. She required frequent cues for posture with resistance band exercises. Will progress as tolerated.     Patient prognosis is Good. Patient will continue to benefit from skilled outpatient physical therapy to address the deficits listed in the problem list chart on initial evaluation, provide patient / family education, and to maximize patient's level of independence in the home and community environment.     Anticipated barriers to physical therapy: unknown fatigue levels with upcoming chemotherapy    Goals as follows:  Short Term Goals: 4 Weeks  Patient will demonstrate 100% understanding of lymphedema risk reduction practices, including self-monitoring for lymphedema (progressing, not met).  Patient will demonstrate independence with established home exercise program for improved strength, functional mobility, range of motion, posture, and endurance. (progressing, not met).   Patient will increase BILATERAL shoulder FLEXION active range of motion to 160 degrees for improved independence with functional reaching, carrying, pushing, and pulling tasks (exceeded, 5/18/23).   Patient will increase BILATERAL shoulder ABDUCTION active range of motion to 160 degrees for improved independence with functional  reaching, carrying, pushing, and pulling tasks (progressing, not met).   Patient will increase gross upper extremity musculature to 4+/5 for improved independence with functional reaching, carrying, pushing, and pulling tasks (progressing, not met).      Long Term Goals: 8 Weeks   Patient will be independent with updated home exercise program for improved functional mobility, posture, strength, and endurance (progressing, not met).  Patient will increase BILATERAL shoulder FLEXION active range of motion to 180 degrees for improved independence with functional reaching, carrying, pushing, and pulling tasks (progressing, not met).   Patient will increase BILATERAL shoulder ABDUCTION active range of motion to 180 degrees for improved independence with functional reaching, carrying, pushing, and pulling tasks (progressing, not met).   Patient will increase gross upper extremity musculature to 5/5 for improved independence with functional reaching, carrying, pushing, and pulling tasks (progressing, not met).   Patient will report decrease in overall worst pain to 2/10 at discharge for improved tolerance to functional reaching, carrying, pushing, and pulling activities of daily living (progressing, not met).  Patient will report compliance with walking program 5x/week for 10 minutes/day to improve overall cardiovascular function and decrease cancer-related fatigue at discharge (progressing, not met).      Plan     Outpatient physical therapy 2x week for 8 weeks to include the following: Gait Training, Manual Therapy, Neuromuscular Re-ed, Patient Education, Self Care, Therapeutic Activities, and Therapeutic Exercise.     Plan of Care Certification Period: 05/18/2023 to 07/14/2023.    Therapist: Leonor Cook, PT  5/18/2023

## 2023-05-19 NOTE — PLAN OF CARE
[facility]  Physical Therapy Plan of Care Note     Name: Trinity Morin  Clinic Number: [id number]    Therapy Diagnosis:   Encounter Diagnoses   Name Primary?    Decreased range of motion of left shoulder Yes    Decreased range of motion of right shoulder     Weakness of both upper extremities     At risk for lymphedema     Poor posture      Physician: Kim Jiménez MD    Visit Date: 5/18/2023    Physician Orders: Eval and Treat   Medical Diagnosis: C50.412,Z17.1 (ICD-10-CM) - Malignant neoplasm of upper-outer quadrant of left breast in female, estrogen receptor negative  Evaluation Date: 03/06/2023  Authorization Period Expiration: 07/29/2023  Plan of Care Expiration: 05/18/2023- 07/14/2023  Progress Note Due: 6/15/2023   Visit # / Visits authorized: 3/ 14(plus eval)  FOTO: 2/3    Precautions: Standard and cancer    SUBJECTIVE     Update: Patient reports: her shoulders and abs are feeling tight today. She has only be able to do her HEP 1x a week due to having to deal with lots of family issues. She is still having trouble reaching up into the cabinets and she would like to continue with therapy for a while.    She was partially compliant with home exercise program  Response to Previous Treatment: felt ok   Pain Scale: Trinity rates pain on a scale of 2/10 on VAS.   Pain Location: shoulders, neck  Fatigue: horrible  Functional change: less difficulty with overhead reaching    OBJECTIVE     Update: Shoulder Range of Motion:   Active ROM Right Left   Flexion 170 165   Abduction 155 160   Extension 60 60   IR/90deg 90 75   ER/90deg 100 90           Upper Extremity Strength:    (R) UE (L) UE   Shoulder Flexion: 4/5* 4/5*   Shoulder Abduction: 4-/5 4-/5   Shoulder IR 4/5 4/5   Shoulder ER 4/5 4/5   Elbow Flexion: 4/5 4/5   Elbow Extension: 4-/5^ 4-/5^   Wrist Flexion: 5/5 5/5   Wrist Extension: 5/5 5/5    39.9 lbs 44.3 lbs    * neck pain   ^abdominal pain     CMS Impairment /  Limitation / Restriction for FOTO Shoulder Survey     Therapist reviewed FOTO scores for Trinity Morin on 5/18/2023.   FOTO documents entered into [employer] - see Media section.     Limitations Score: 40%  Category: Carrying    ASSESSMENT     Update: Patient was able to demonstrate an increase in B UE AROM compared to her initial evaluation but reports continuing to limit difficulty with overhead activities. Her B UE strength has improved but also continues to limit her ability to perform her usual ADLs. She would benefit from continued physical therapy to improve her ROM, strength, tissue mobility, and functional mobility.    Previous Short Term Goals Status:   STG #3 met, all other in progress  New Short Term Goals Status:   N/A  Long Term Goal Status: continue per initial plan of care.  Reasons for Recertification of Therapy:   decreased ROM, weakness, decreased functional mobility    GOALS  Short Term Goals: 4 Weeks  Patient will demonstrate 100% understanding of lymphedema risk reduction practices, including self-monitoring for lymphedema (progressing, not met).  Patient will demonstrate independence with established home exercise program for improved strength, functional mobility, range of motion, posture, and endurance. (progressing, not met).   Patient will increase BILATERAL shoulder FLEXION active range of motion to 160 degrees for improved independence with functional reaching, carrying, pushing, and pulling tasks (exceeded, 5/18/23).   Patient will increase BILATERAL shoulder ABDUCTION active range of motion to 160 degrees for improved independence with functional reaching, carrying, pushing, and pulling tasks (progressing, not met).   Patient will increase gross upper extremity musculature to 4+/5 for improved independence with functional reaching, carrying, pushing, and pulling tasks (progressing, not met).      Long Term Goals: 8 Weeks   Patient will be independent with updated home exercise program for  improved functional mobility, posture, strength, and endurance (progressing, not met).  Patient will increase BILATERAL shoulder FLEXION active range of motion to 180 degrees for improved independence with functional reaching, carrying, pushing, and pulling tasks (progressing, not met).   Patient will increase BILATERAL shoulder ABDUCTION active range of motion to 180 degrees for improved independence with functional reaching, carrying, pushing, and pulling tasks (progressing, not met).   Patient will increase gross upper extremity musculature to 5/5 for improved independence with functional reaching, carrying, pushing, and pulling tasks (progressing, not met).   Patient will report decrease in overall worst pain to 2/10 at discharge for improved tolerance to functional reaching, carrying, pushing, and pulling activities of daily living (progressing, not met).  Patient will report compliance with walking program 5x/week for 10 minutes/day to improve overall cardiovascular function and decrease cancer-related fatigue at discharge (progressing, not met).     PLAN     Updated Certification Period: 05/18/2023 to 07/14/2023   Recommended Treatment Plan: 2 times per week for 8 weeks:   Gait Training, Manual Therapy, Neuromuscular Re-ed, Patient Education, Self Care, Therapeutic Activities, and Therapeutic Exercise.  Other Recommendations: none    Leonor Cook, PT

## 2023-05-22 ENCOUNTER — CLINICAL SUPPORT (OUTPATIENT)
Dept: SPEECH THERAPY | Facility: HOSPITAL | Age: 58
End: 2023-05-22
Payer: OTHER GOVERNMENT

## 2023-05-22 DIAGNOSIS — J38.2 VOCAL FOLD NODULES: ICD-10-CM

## 2023-05-22 DIAGNOSIS — R49.0 DYSPHONIA: Primary | ICD-10-CM

## 2023-05-22 PROCEDURE — 92524 BEHAVRAL QUALIT ANALYS VOICE: CPT | Mod: GN,95 | Performed by: SPEECH-LANGUAGE PATHOLOGIST

## 2023-05-22 NOTE — PROGRESS NOTES
"Referring provider: Dr. Brice Vazquez  Reason for visit:  Behavioral and qualitative analysis of voice and resonance (CPT 04706)    The patient location is: [place]  The chief complaint leading to consultation is: voice  Visit type: audiovisual  Face to Face time with patient: 35  40 minutes of total time spent on the encounter, which includes face to face time and non-face to face time preparing to see the patient (eg, review of tests), Obtaining and/or reviewing separately obtained history, Documenting clinical information in the electronic or other health record, Independently interpreting results (not separately reported) and communicating results to the patient/family/caregiver, or Care coordination (not separately reported).     Subjective / History    Trinity Morin is a 57 y.o. female referred for voice evaluation (CPT 13968) by Dr. Vazquez.  She presents with complaints of hoarseness, difficulty singing which began a few years ago.  The patient also endorses: voice worse in morning, fatigue with use, changes in modal pitch, and difficulty with singing.   She sings and doesn't feel like she has been able to sing well for a while.   Patient does not work outside the home at this time.  She describes herself as talkative.    Swallowing: reports that she swallows "hard" or "harsh"   Breathing:  sleep apnea     Smokin packs/day   Caffeine: minimal  Reflux:  possible  Water: 2-3 glasses, but inconsistent    Stroboscopy findings (per Dr. Vazquez on 3/6/23)  - left vocal fold with sessile convexity along free edge, middle third  - smaller convexity along free edge of right vocal fold, middle third  - premature contact, hourglass closure, pliability intact  - mild insufficiency with A/P gap in uppermost register     Past Medical History:   Diagnosis Date    Asthma     General anesthetics causing adverse effect in therapeutic use     Sleep apnea     wears mouth device     Current Outpatient Medications on " File Prior to Visit   Medication Sig Dispense Refill    albuterol (PROVENTIL/VENTOLIN HFA) 90 mcg/actuation inhaler INHALE 1 PUFF BY MOUTH FOUR TIMES A DAY AS NEEDED      ALBUTEROL INHL Inhale into the lungs 4 (four) times daily as needed.      ascorbic acid, vitamin C, (VITAMIN C) 500 MG tablet 500 mg.      cyanocobalamin, vitamin B-12, 50 mcg tablet Take 50 mcg by mouth once daily.      dexAMETHasone (DECADRON) 4 MG Tab 2 tabs twice daily, the day before and the day after chemotherapy 40 tablet 0    dexAMETHasone (DECADRON) 4 MG Tab 2 tabs twice daily, the day before and the day after chemotherapy (Patient not taking: Reported on 4/6/2023) 40 tablet 0    fluconazole (DIFLUCAN) 150 MG Tab 150 mg.      hydrocortisone 2.5 % cream Apply topically 2 (two) times daily. 20 g 0    hydrOXYzine (ATARAX) 50 MG tablet 50 mg.      methocarbamoL (ROBAXIN) 500 MG Tab TAKE 1 TO 2 TABLETS BY MOUTH THREE TIMES A DAY AS NEEDED FOR MUSCLE SPASMS      oxyCODONE (OXY-IR) 5 mg Cap Take 1 capsule (5 mg total) by mouth every 4 (four) hours as needed for Pain. (Patient not taking: Reported on 3/7/2023) 10 capsule 0    vitamin D (VITAMIN D3) 1000 units Tab Take 1,000 Units by mouth once daily.       Current Facility-Administered Medications on File Prior to Visit   Medication Dose Route Frequency Provider Last Rate Last Admin    LIDOcaine (PF) 10 mg/ml (1%) injection 10 mg  1 mL Intradermal Once Bee Hernandez PA-C        mupirocin 2 % ointment   Nasal On Call Procedure Bee Hernandez PA-C   Given at 02/08/23 0617    sodium chloride 0.9% flush 10 mL  10 mL Intravenous PRN Bee Hernandez PA-C           Objective    Perceptual/behavioral assessment  -CAPE-V Overall Score: 28  -Quality: rough  -Volume: appropriate for age and gender  -Pitch: low F0  -Flexibility: diminished  -Habitual respiratory pattern: chest/clavicular    Education / Stimulability Trials   Discussed importance of vocal hygiene including: hydration and conservation.  Patient was stimulable for improved voice using SOVT/resonant exercises.  She was instructed on resonant humming and lip trills and was able to improve voice into connected speech as well beyond isolated exercises.  Encouraged practicing exercises several times daily in isolation and into short phrases to solidify muscle memory patterns and reduce extralaryngeal tension during speech tasks.  She was amenable to all suggestions.     Functional goals  Length Status Goal   Long term Initiated Patient will implement and adhere to vocal hygiene protocols on a daily basis, including the elimination of phonotraumatic behaviors.    Long term Initiated Patient and clinician will facilitate changes in vocal function in order to restore functional use of voice for daily occupational, social, and emotional demands.    Long term Initiated Patient will improve coordination of respiration and phonation for efficient vocal production at a conversational level.    Short term Initiated Patient will coordinate vocal subsystems in hierarchical speech tasks by producing sound in an efficient manner yielding improved or normal voice quality and vocal endurance in the presence of existing laryngeal disorder with 90% accuracy independently.   Short term Initiated Patient will reduce vocal effort and fatigue by decreasing upper body tension as evidenced by a decrease in symptoms and lack of observable/palpable signs of hyperkinetic muscular behaviors.   Short term Initiated Patient will complete SOVT exercises and/or resonant-focused exercises 3-5x daily to strengthen and balance the intrinsic laryngeal musculature and maximize glottic closure without medial hyperfunction.   Short term Initiated Patient will improve the quality, efficiency, and ease of phonation through resonant and/or airflow exercises from the syllable to conversation level with 80% accuracy.   Short term Initiated Patient will increase awareness for voice conservations  behaviors by following the 50/10 rule and reduce voice use in competing noise environments.    Short term Initiated Patient will demonstrate the ability to increase awareness of voicing behavior through self-monitoring to facilitate generalization in functional speaking situations with 80% accuracy.        Assessment     Trinity Morin presents with moderate dysphonia.  The primary encounter diagnosis was Dysphonia. A diagnosis of Vocal fold nodules was also pertinent to this visit.  Prognosis for continued improvement is good.     Recommendations / POC    Recommend 2-4 sessions of voice/speech therapy over 4-6 weeks with a speech-language pathologist to optimize glottal postures for improved vocal function, vocal efficiency, and ease of phonation.  She should continue the exercises as discussed in session and should contact me with any further questions.

## 2023-05-25 ENCOUNTER — CLINICAL SUPPORT (OUTPATIENT)
Dept: REHABILITATION | Facility: HOSPITAL | Age: 58
End: 2023-05-25
Payer: OTHER GOVERNMENT

## 2023-05-25 ENCOUNTER — OFFICE VISIT (OUTPATIENT)
Dept: PLASTIC SURGERY | Facility: CLINIC | Age: 58
End: 2023-05-25
Payer: OTHER GOVERNMENT

## 2023-05-25 VITALS — SYSTOLIC BLOOD PRESSURE: 124 MMHG | DIASTOLIC BLOOD PRESSURE: 69 MMHG | HEART RATE: 86 BPM

## 2023-05-25 DIAGNOSIS — C50.412 MALIGNANT NEOPLASM OF UPPER-OUTER QUADRANT OF LEFT BREAST IN FEMALE, ESTROGEN RECEPTOR NEGATIVE: Primary | ICD-10-CM

## 2023-05-25 DIAGNOSIS — R29.3 POOR POSTURE: ICD-10-CM

## 2023-05-25 DIAGNOSIS — Z17.1 MALIGNANT NEOPLASM OF UPPER-OUTER QUADRANT OF LEFT BREAST IN FEMALE, ESTROGEN RECEPTOR NEGATIVE: Primary | ICD-10-CM

## 2023-05-25 DIAGNOSIS — Z98.890 S/P BILATERAL BREAST REDUCTION: ICD-10-CM

## 2023-05-25 DIAGNOSIS — M25.612 DECREASED RANGE OF MOTION OF LEFT SHOULDER: Primary | ICD-10-CM

## 2023-05-25 DIAGNOSIS — M25.611 DECREASED RANGE OF MOTION OF RIGHT SHOULDER: ICD-10-CM

## 2023-05-25 DIAGNOSIS — Z91.89 AT RISK FOR LYMPHEDEMA: ICD-10-CM

## 2023-05-25 DIAGNOSIS — R29.898 WEAKNESS OF BOTH UPPER EXTREMITIES: ICD-10-CM

## 2023-05-25 PROCEDURE — 97110 THERAPEUTIC EXERCISES: CPT

## 2023-05-25 PROCEDURE — 99999 PR PBB SHADOW E&M-EST. PATIENT-LVL III: CPT | Mod: PBBFAC,,, | Performed by: SURGERY

## 2023-05-25 PROCEDURE — 97112 NEUROMUSCULAR REEDUCATION: CPT

## 2023-05-25 PROCEDURE — 99999 PR PBB SHADOW E&M-EST. PATIENT-LVL III: ICD-10-PCS | Mod: PBBFAC,,, | Performed by: SURGERY

## 2023-05-25 PROCEDURE — 99213 PR OFFICE/OUTPT VISIT, EST, LEVL III, 20-29 MIN: ICD-10-PCS | Mod: S$PBB,,, | Performed by: SURGERY

## 2023-05-25 PROCEDURE — 99213 OFFICE O/P EST LOW 20 MIN: CPT | Mod: S$PBB,,, | Performed by: SURGERY

## 2023-05-25 PROCEDURE — 99213 OFFICE O/P EST LOW 20 MIN: CPT | Mod: PBBFAC | Performed by: SURGERY

## 2023-05-25 NOTE — PROGRESS NOTES
"                                                    Physical Therapy Daily Treatment Note     Date: 5/25/2023   Name: Trinity Morin  Clinic Number: [id number]    Therapy Diagnosis:   Encounter Diagnoses   Name Primary?    Decreased range of motion of left shoulder Yes    Decreased range of motion of right shoulder     Weakness of both upper extremities     At risk for lymphedema     Poor posture      Physician: Kim Jiménez MD    Physician Orders: PT Eval and Treat  Medical Diagnosis: C50.412,Z17.1 (ICD-10-CM) - Malignant neoplasm of upper-outer quadrant of left breast in female, estrogen receptor negative  Evaluation Date: 03/06/2023  Authorization Period Expiration: 07/29/2023  Plan of Care Certification Period: 05/18/2023-07/14/2023  Visit # / Visits Authorized: 4 / 14 (plus eval)  Insurance: [employer] / [facility] [employer] [employer]  FOTO: 2/3    Time In: 4:10 PM (late arrival from previous delayed appointment)  Time Out: 4:40 PM (ended session early for patient to attend another appointment)  Total Billable Time: 30 minutes    Precautions: Standard and cancer      Subjective     Patient reports: Dr. Curry satisfied with healing and cleared patient for next surgery (date unscheduled). Patient reports tightness and occasional "pinching" when reaching overhead.    She was compliant with home exercise program  Response to Previous Treatment: no adverse events    Pain Scale: Trinity rates tightness on a scale of 2/10 on VAS.   Pain Location: L axilla    Fatigue: 5/10  Functional Change: improving tolerance to overhead reaching    Treatment:  Chemo: adjuvant 4 cycles (Taxotere + Cytoxan), not yet started  Radiation: none  Hormone Therapy: none    Surgery Date: 2/8/23      Objective     Objective Measures updated at progress report unless specified.     Shoulder Range of Motion:   Active ROM Right Left   Flexion 170 165   Abduction 155 160   Extension 60 60   IR/90deg 90 75   ER/90deg 100 90         Treatment "     Trinity received the following individual therapeutic exercises to improve flexibility, soft tissue mobility, and strength for 15 minutes:     Seated Exercises:   - Pulleys (flexion, abduction)   2 min each    Standing Exercise:   - Wall slides (flexion, abduction)  1 set x 10 reps/each    Mat Exercises:   - Butterfly stretch with LTR (behind head) 2 min  - AAROM wand flexion    2 min       Trinity received the following individual neuromuscular re-education activities to improve coordination, kinesthetic sense, proprioception, and posture for 15 minutes:     Standing Exercises:  - Shoulder rows (green)   2 set x 10 reps  - Shoulder extensions (green)  2 set x 10 reps    Seated Exercises:   - Bilateral shoulder ER (green)  2 set x 10 reps  - Horizontal abd (green)   2 set x 10 reps - held  - PNF D2 flexion, delphine (green)   1 set x 10 reps - held      Home Exercise Program and Patient Education     Education Provided Regarding:  - Role of PT in multi - disciplinary team  - Goals for physical therapy, progress towards goals   - Compliance with home exercise program, exercise technique  - Use of a cervical roll for sleeping posture and a lumbar roll for sitting posture    Written Home Exercises Provided: yes. Exercises were reviewed and Trinity was able to demonstrate them prior to the end of the session. Trinity demonstrated good  understanding of the education provided.     See EMR under Patient Instructions for exercises provided 3/14/2023 and 05/25/2023.     Patient has no cultural, educational, or language barriers to learning provided.    Assessment     Patient is responding of physical therapy well. Patient able to perform exercises without increase in symptoms prior to leaving the clinic. Improved AAROM with wall slides and added supine shoulder flexion with wand. Improved scapular stabilization with added shoulder extensions. Will progress as tolerated.     Patient prognosis is Good. Patient will  continue to benefit from skilled outpatient physical therapy to address the deficits listed in the problem list chart on initial evaluation, provide patient / family education, and to maximize patient's level of independence in the home and community environment.     Anticipated barriers to physical therapy: unknown fatigue levels with upcoming chemotherapy    Goals as follows:  Short Term Goals: 4 Weeks  Patient will demonstrate 100% understanding of lymphedema risk reduction practices, including self-monitoring for lymphedema (progressing, not met).  Patient will demonstrate independence with established home exercise program for improved strength, functional mobility, range of motion, posture, and endurance. (progressing, not met).   Patient will increase BILATERAL shoulder FLEXION active range of motion to 160 degrees for improved independence with functional reaching, carrying, pushing, and pulling tasks (exceeded, 5/18/23).   Patient will increase BILATERAL shoulder ABDUCTION active range of motion to 160 degrees for improved independence with functional reaching, carrying, pushing, and pulling tasks (progressing, not met).   Patient will increase gross upper extremity musculature to 4+/5 for improved independence with functional reaching, carrying, pushing, and pulling tasks (progressing, not met).      Long Term Goals: 8 Weeks   Patient will be independent with updated home exercise program for improved functional mobility, posture, strength, and endurance (progressing, not met).  Patient will increase BILATERAL shoulder FLEXION active range of motion to 180 degrees for improved independence with functional reaching, carrying, pushing, and pulling tasks (progressing, not met).   Patient will increase BILATERAL shoulder ABDUCTION active range of motion to 180 degrees for improved independence with functional reaching, carrying, pushing, and pulling tasks (progressing, not met).   Patient will increase gross  upper extremity musculature to 5/5 for improved independence with functional reaching, carrying, pushing, and pulling tasks (progressing, not met).   Patient will report decrease in overall worst pain to 2/10 at discharge for improved tolerance to functional reaching, carrying, pushing, and pulling activities of daily living (progressing, not met).  Patient will report compliance with walking program 5x/week for 10 minutes/day to improve overall cardiovascular function and decrease cancer-related fatigue at discharge (progressing, not met).      Plan     Outpatient physical therapy 2x week for 8 weeks to include the following: Gait Training, Manual Therapy, Neuromuscular Re-ed, Patient Education, Self Care, Therapeutic Activities, and Therapeutic Exercise.     Plan of Care Certification Period: 05/18/2023 to 07/14/2023.    Therapist: Genoevva Schmid, PT  5/25/2023

## 2023-05-26 ENCOUNTER — TELEPHONE (OUTPATIENT)
Dept: PLASTIC SURGERY | Facility: CLINIC | Age: 58
End: 2023-05-26
Payer: OTHER GOVERNMENT

## 2023-05-26 DIAGNOSIS — Z17.1 MALIGNANT NEOPLASM OF UPPER-OUTER QUADRANT OF LEFT BREAST IN FEMALE, ESTROGEN RECEPTOR NEGATIVE: Primary | ICD-10-CM

## 2023-05-26 DIAGNOSIS — C50.412 MALIGNANT NEOPLASM OF UPPER-OUTER QUADRANT OF LEFT BREAST IN FEMALE, ESTROGEN RECEPTOR NEGATIVE: Primary | ICD-10-CM

## 2023-05-26 NOTE — TELEPHONE ENCOUNTER
Spoke to pt and offered a surgery date of  7/5/23  at the [place] location, 2nd floor. Pt agreeable. Provided patient with details of pre/post op appt and basic prep for sx and also address of the location.  call back # to RN navigator given should any questions or concerns arise  All questions and concerns addressed. Pt voiced understanding

## 2023-05-26 NOTE — PROGRESS NOTES
Trinity Morin presents [facility] for a follow up visit status post bilateral JUNAID flap reconstruction on 2/8/23. She developed a seroma of her lower abdomen after the abdominal drains were pulled. This required aspiration in the clinic. She is doing well today with no issues since her last visit. She is here to discuss her second stage surgery. She is happy with her breast flaps. She has a dog ear on the left. What she would like addressed most is her perceived upper abdominal fullness. She denies fever, chills, nausea, vomiting or other systemic signs of infection.       ROS - negative, other than stated above    PHYSICAL EXAMINATION  Vitals:    05/25/23 1516   BP: 124/69   Pulse: 86     WD WN NAD  VSS  Normal respiratory effort  Bilateral breast reconstruction well healed with no issues. Nipples viable but without sensation. Left lateral dog ear at distal IMF incision.  Abdominal incision well healed  Umbilical incision well healed  She has a rounded appearance of her whole abdomen without fluctuance or palpable fluid collection     ASSESSMENT/PLAN  57 y.o. F s/p bilateral JUNAID flap reconstruction  - Doing well, no issues.   - We discussed the rounded appearance of her abdomen has more to do with her abdominal flap being pulled down over her natural abdominal contour and does not represent any concerns today about seroma/hematoma. We will lipo the upper abdomen and flanks at the second stage. Additionally, we will revise the dog ear and bring down the small skin paddles on the flaps to obtain a better contour. She is happy with this plan.  - She'd like to have surgery mid July - will work to schedule her with this in mind  - Follow up for preop visit once surgery is scheduled      All questions were answered. The patient was advised to contact the clinic with any questions or concerns prior to their next visit.       Bee Hernandez PA-C  Plastic and Reconstructive Surgery

## 2023-05-31 ENCOUNTER — CLINICAL SUPPORT (OUTPATIENT)
Dept: REHABILITATION | Facility: HOSPITAL | Age: 58
End: 2023-05-31
Payer: OTHER GOVERNMENT

## 2023-05-31 DIAGNOSIS — R29.3 POOR POSTURE: ICD-10-CM

## 2023-05-31 DIAGNOSIS — Z91.89 AT RISK FOR LYMPHEDEMA: ICD-10-CM

## 2023-05-31 DIAGNOSIS — M25.611 DECREASED RANGE OF MOTION OF RIGHT SHOULDER: ICD-10-CM

## 2023-05-31 DIAGNOSIS — M25.612 DECREASED RANGE OF MOTION OF LEFT SHOULDER: Primary | ICD-10-CM

## 2023-05-31 DIAGNOSIS — R29.898 WEAKNESS OF BOTH UPPER EXTREMITIES: ICD-10-CM

## 2023-05-31 PROCEDURE — 97110 THERAPEUTIC EXERCISES: CPT

## 2023-05-31 PROCEDURE — 97112 NEUROMUSCULAR REEDUCATION: CPT

## 2023-05-31 NOTE — PROGRESS NOTES
Physical Therapy Daily Treatment Note     Date: 5/31/2023   Name: Trinity Morin  Clinic Number: [id number]    Therapy Diagnosis:   Encounter Diagnoses   Name Primary?    Decreased range of motion of left shoulder Yes    Decreased range of motion of right shoulder     Weakness of both upper extremities     At risk for lymphedema     Poor posture        Physician: Kim Jiménez MD    Physician Orders: PT Eval and Treat  Medical Diagnosis: C50.412,Z17.1 (ICD-10-CM) - Malignant neoplasm of upper-outer quadrant of left breast in female, estrogen receptor negative  Evaluation Date: 03/06/2023  Authorization Period Expiration: 07/29/2023  Plan of Care Certification Period: 05/18/2023-07/14/2023  Visit # / Visits Authorized: 5 / 14 (plus eval)  Insurance: [employer] / [facility] [employer] [employer]  FOTO: 2/3    Time In: 5:12 pm  Time Out: 6:00 pm  Total Billable Time: 48 minutes    Precautions: Standard and cancer      Subjective     Patient reports: just finished her other PT where she worked on her core to her feet. She is still having some pain to the touch in her chest area. She just has the usual stiffness in her shoulders and still having that same burning pinching feeling.     She was compliant with home exercise program  Response to Previous Treatment: felt good,     Pain Scale: Trinity rates tightness on a scale of 1/10 on VAS.   Pain Location: L axilla    Fatigue: 5/10 still fatigued  Functional Change: improving tolerance to overhead reaching    Treatment:  Chemo: adjuvant 4 cycles (Taxotere + Cytoxan), not yet started  Radiation: none  Hormone Therapy: none    Surgery Date: 2/8/23      Objective     Objective Measures updated at progress report unless specified.     Shoulder Range of Motion: 5/31/23  Active ROM Right Left   Flexion 180 180   Abduction 175 175   Extension 60 60   IR/90deg 70 75   ER/90deg 100 100         Treatment     Trinity received the  following individual therapeutic exercises to improve flexibility, soft tissue mobility, and strength for 15 minutes:     Seated Exercises:   - Pulleys (flexion, abduction)   2 min each    Standing Exercise:   - Wall slides (flexion, abduction)  1 set x 10 reps/each - held  - wall bounces     2 reps x 30 sec  - scapula protraction/retraction  1 set x 10 reps  - wall push ups    1 set x 10 reps    Mat Exercises:   - Butterfly stretch with LTR (behind head) 2 min  - Supine wand flexion, 2#   2 sets x 10 reps  - pec stretch, 1 towel roll   2 min    Trinity received the following individual neuromuscular re-education activities to improve coordination, kinesthetic sense, proprioception, and posture for 28 minutes:     Standing Exercises:  - Shoulder rows (green)   2 set x 15 reps  - Shoulder extensions (green)  2 set x 15 reps  - Scapula lift off    1 set x 5 reps    Seated Exercises:   - Bilateral shoulder ER (green)  2 set x 15 reps  - Horizontal abd (green)   2 set x 15 reps   - PNF D2 flexion, delphine (green)   2 set x 10 reps       Home Exercise Program and Patient Education     Education Provided Regarding:  - Role of PT in multi - disciplinary team  - Goals for physical therapy, progress towards goals   - Compliance with home exercise program, exercise technique  - Use of a cervical roll for sleeping posture and a lumbar roll for sitting posture    Written Home Exercises Provided: yes. Exercises were reviewed and Trinity was able to demonstrate them prior to the end of the session. Trinity demonstrated good  understanding of the education provided.     See EMR under Patient Instructions for exercises provided 3/14/2023 and 05/25/2023.     Patient has no cultural, educational, or language barriers to learning provided.    Assessment     Patient is responding of physical therapy well. She demonstrated AROM of bilateral shoulders WNL, except for internal rotation. She was able to perform all of today's progressions  and new exercises with no increase in symptoms prior to leaving the clinic. She required occasional cues to relax her shoulders with B shoulder ER exercise. She has met some of her short term and long term goals as noted below. Will progress as tolerated.     Patient prognosis is Good. Patient will continue to benefit from skilled outpatient physical therapy to address the deficits listed in the problem list chart on initial evaluation, provide patient / family education, and to maximize patient's level of independence in the home and community environment.     Anticipated barriers to physical therapy: unknown fatigue levels with upcoming chemotherapy    Goals as follows:  Short Term Goals: 4 Weeks  Patient will demonstrate 100% understanding of lymphedema risk reduction practices, including self-monitoring for lymphedema (progressing, not met).  Patient will demonstrate independence with established home exercise program for improved strength, functional mobility, range of motion, posture, and endurance. (progressing, not met).   Patient will increase BILATERAL shoulder FLEXION active range of motion to 160 degrees for improved independence with functional reaching, carrying, pushing, and pulling tasks (exceeded, 5/18/23).   Patient will increase BILATERAL shoulder ABDUCTION active range of motion to 160 degrees for improved independence with functional reaching, carrying, pushing, and pulling tasks (exceed, 5/31/23).   Patient will increase gross upper extremity musculature to 4+/5 for improved independence with functional reaching, carrying, pushing, and pulling tasks (progressing, not met).      Long Term Goals: 8 Weeks   Patient will be independent with updated home exercise program for improved functional mobility, posture, strength, and endurance (progressing, not met).  Patient will increase BILATERAL shoulder FLEXION active range of motion to 180 degrees for improved independence with functional reaching,  carrying, pushing, and pulling tasks (met, 5/31/23).   Patient will increase BILATERAL shoulder ABDUCTION active range of motion to 180 degrees for improved independence with functional reaching, carrying, pushing, and pulling tasks (progressing, not met).   Patient will increase gross upper extremity musculature to 5/5 for improved independence with functional reaching, carrying, pushing, and pulling tasks (progressing, not met).   Patient will report decrease in overall worst pain to 2/10 at discharge for improved tolerance to functional reaching, carrying, pushing, and pulling activities of daily living (progressing, not met).  Patient will report compliance with walking program 5x/week for 10 minutes/day to improve overall cardiovascular function and decrease cancer-related fatigue at discharge (progressing, not met).      Plan     Outpatient physical therapy 2x week for 8 weeks to include the following: Gait Training, Manual Therapy, Neuromuscular Re-ed, Patient Education, Self Care, Therapeutic Activities, and Therapeutic Exercise.     Plan of Care Certification Period: 05/18/2023 to 07/14/2023.    Therapist: Leonor Cook, PT  5/31/2023

## 2023-06-02 ENCOUNTER — HOSPITAL ENCOUNTER (OUTPATIENT)
Dept: RADIOLOGY | Facility: HOSPITAL | Age: 58
Discharge: HOME | End: 2023-06-02
Attending: PODIATRIST
Payer: OTHER GOVERNMENT

## 2023-06-02 ENCOUNTER — OFFICE VISIT (OUTPATIENT)
Dept: PODIATRY | Facility: CLINIC | Age: 58
End: 2023-06-02
Payer: OTHER GOVERNMENT

## 2023-06-02 VITALS — WEIGHT: 141 LBS | BODY MASS INDEX: 28.43 KG/M2 | HEIGHT: 59 IN

## 2023-06-02 DIAGNOSIS — M19.079 ARTHRITIS OF FOOT: ICD-10-CM

## 2023-06-02 DIAGNOSIS — M24.573 EQUINUS CONTRACTURE OF ANKLE: ICD-10-CM

## 2023-06-02 DIAGNOSIS — M35.7: ICD-10-CM

## 2023-06-02 DIAGNOSIS — M79.671 FOOT PAIN, RIGHT: ICD-10-CM

## 2023-06-02 DIAGNOSIS — M19.079 ARTHRITIS OF FOOT: Primary | ICD-10-CM

## 2023-06-02 PROCEDURE — 29540 STRAPPING ANKLE &/FOOT: CPT | Mod: PBBFAC,PO,RT | Performed by: PODIATRIST

## 2023-06-02 PROCEDURE — 99213 OFFICE O/P EST LOW 20 MIN: CPT | Mod: PBBFAC,PO,25 | Performed by: PODIATRIST

## 2023-06-02 PROCEDURE — 73630 X-RAY EXAM OF FOOT: CPT | Mod: 26,RT,, | Performed by: RADIOLOGY

## 2023-06-02 PROCEDURE — 99204 OFFICE O/P NEW MOD 45 MIN: CPT | Mod: 25,S$PBB,, | Performed by: PODIATRIST

## 2023-06-02 PROCEDURE — 99999 PR PBB SHADOW E&M-EST. PATIENT-LVL III: ICD-10-PCS | Mod: PBBFAC,,, | Performed by: PODIATRIST

## 2023-06-02 PROCEDURE — 73630 XR FOOT COMPLETE 3 VIEW RIGHT: ICD-10-PCS | Mod: 26,RT,, | Performed by: RADIOLOGY

## 2023-06-02 PROCEDURE — 29540 PR STRAPPING; ANKLE &/OR FOOT: ICD-10-PCS | Mod: S$PBB,RT,, | Performed by: PODIATRIST

## 2023-06-02 PROCEDURE — 99204 PR OFFICE/OUTPT VISIT, NEW, LEVL IV, 45-59 MIN: ICD-10-PCS | Mod: 25,S$PBB,, | Performed by: PODIATRIST

## 2023-06-02 PROCEDURE — 99999 PR PBB SHADOW E&M-EST. PATIENT-LVL III: CPT | Mod: PBBFAC,,, | Performed by: PODIATRIST

## 2023-06-02 PROCEDURE — 29540 STRAPPING ANKLE &/FOOT: CPT | Mod: S$PBB,RT,, | Performed by: PODIATRIST

## 2023-06-02 PROCEDURE — 73630 X-RAY EXAM OF FOOT: CPT | Mod: TC,FY,PO,RT

## 2023-06-02 RX ORDER — LIDOCAINE AND PRILOCAINE 25; 25 MG/G; MG/G
CREAM TOPICAL
Qty: 30 G | Refills: 3 | Status: SHIPPED | OUTPATIENT
Start: 2023-06-02 | End: 2023-12-13

## 2023-06-02 NOTE — PROGRESS NOTES
Subjective:      Patient ID: Trinity Morin is a 57 y.o. female.    Chief Complaint: Foot Problem (Tingling and stiffness, R foot)    Achind tingling pain right midfoot.  Gradual onset, worsening over past several weeks, aggravated by increased weight bearing, shoe gear, pressure.  No previous medical treatment.  OTC pain med not helping. Denies trauma. Relates 8 foot surteries in the past.    Review of Systems   Constitutional: Negative for chills, diaphoresis, fever, malaise/fatigue and night sweats.   Cardiovascular:  Negative for claudication, cyanosis, leg swelling and syncope.   Skin:  Negative for color change, dry skin, nail changes, rash, suspicious lesions and unusual hair distribution.   Musculoskeletal:  Positive for joint pain. Negative for falls, joint swelling, muscle cramps, muscle weakness and stiffness.   Gastrointestinal:  Negative for constipation, diarrhea, nausea and vomiting.   Neurological:  Negative for brief paralysis, disturbances in coordination, focal weakness, numbness, paresthesias, sensory change and tremors.         Objective:      Physical Exam  Constitutional:       General: She is not in acute distress.     Appearance: She is well-developed. She is not diaphoretic.   Cardiovascular:      Pulses:           Popliteal pulses are 2+ on the right side and 2+ on the left side.        Dorsalis pedis pulses are 2+ on the right side and 2+ on the left side.        Posterior tibial pulses are 2+ on the right side and 2+ on the left side.      Comments: Capillary refill 3 seconds all toes/distal feet, all toes/both feet warm to touch.      Negative lymphadenopathy bilateral popliteal fossa and tarsal tunnel.      Negavie lower extremity edema bilateral.    Musculoskeletal:      Right ankle: No swelling, deformity, ecchymosis or lacerations. Normal range of motion. Normal pulse.      Right Achilles Tendon: Normal. No defects. Marti's test negative.      Comments: Diffuse aching tingling  pain rignt midfoot / first ray dorsal and plantar without deformity, loss of function, or signs acute trauma.    Ankle dorsiflexion decreased at <10 degrees bilateral with moderate increase with knee flexion bilateral.    Hypermobile first ray right 11mm dorsal displacement from neutraly    Lymphadenopathy:      Lower Body: No right inguinal adenopathy. No left inguinal adenopathy.      Comments: Negative lymphadenopathy bilateral popliteal fossa and tarsal tunnel.    Negative lymphangitic streaking bilateral feet/ankles/legs.   Skin:     General: Skin is warm and dry.      Capillary Refill: Capillary refill takes 2 to 3 seconds.      Coloration: Skin is not pale.      Findings: No abrasion, bruising, burn, ecchymosis, erythema, laceration, lesion or rash.      Nails: There is no clubbing.      Comments:      Neurological:      Mental Status: She is alert and oriented to person, place, and time.      Sensory: No sensory deficit.      Motor: No tremor, atrophy or abnormal muscle tone.      Gait: Gait normal.      Deep Tendon Reflexes:      Reflex Scores:       Patellar reflexes are 2+ on the right side and 2+ on the left side.       Achilles reflexes are 2+ on the right side and 2+ on the left side.  Psychiatric:         Behavior: Behavior is cooperative.           Assessment:       Encounter Diagnoses   Name Primary?    Arthritis of foot Yes    Foot pain, right     Hypermobile joint syndrome of ankle     Equinus contracture of ankle          Plan:       Trinity was seen today for foot problem.    Diagnoses and all orders for this visit:    Arthritis of foot  -     X-Ray Foot Complete Right; Future    Foot pain, right  -     X-Ray Foot Complete Right; Future    Hypermobile joint syndrome of ankle  -     X-Ray Foot Complete Right; Future    Equinus contracture of ankle  -     X-Ray Foot Complete Right; Future    Other orders  -     LIDOcaine-prilocaine (EMLA) cream; Apply topically as needed.      I counseled the  patient on her conditions, their implications and medical management.        Patient will stretch the tendo achilles complex three times daily as demonstrated in the office.  Literature was dispensed illustrating proper stretching technique.    I applied a plantar rest strapping to the patient's right foot to offload symptomatic area, support the arch, and relieve pain.    Patient will obtain over the counter arch supports and wear them in shoes whenever possible.  Athletic shoes intended for walking or running are usually best.    The patient was advised that NSAID-type medications have two very important potential side effects: gastrointestinal irritation including hemorrhage and renal injuries. She was asked to take the medication with food and to stop if she experiences any GI upset. I asked her to call for vomiting, abdominal pain or black/bloody stools. The patient expresses understanding of these issues and questions were answered.    Discussed conservative treatment with shoes of adequate dimensions, material, and style to alleviate symptoms and delay or prevent surgical intervention.    Emla    Xrays right              Follow up in about 1 month (around 7/2/2023).

## 2023-06-13 ENCOUNTER — OFFICE VISIT (OUTPATIENT)
Dept: PLASTIC SURGERY | Facility: CLINIC | Age: 58
End: 2023-06-13
Payer: OTHER GOVERNMENT

## 2023-06-13 VITALS — SYSTOLIC BLOOD PRESSURE: 124 MMHG | DIASTOLIC BLOOD PRESSURE: 69 MMHG | HEART RATE: 86 BPM

## 2023-06-13 DIAGNOSIS — Z17.1 MALIGNANT NEOPLASM OF UPPER-OUTER QUADRANT OF LEFT BREAST IN FEMALE, ESTROGEN RECEPTOR NEGATIVE: Primary | ICD-10-CM

## 2023-06-13 DIAGNOSIS — C50.412 MALIGNANT NEOPLASM OF UPPER-OUTER QUADRANT OF LEFT BREAST IN FEMALE, ESTROGEN RECEPTOR NEGATIVE: Primary | ICD-10-CM

## 2023-06-13 DIAGNOSIS — C50.412 MALIGNANT NEOPLASM OF UPPER-OUTER QUADRANT OF LEFT BREAST IN FEMALE, ESTROGEN RECEPTOR NEGATIVE: ICD-10-CM

## 2023-06-13 DIAGNOSIS — Z17.1 MALIGNANT NEOPLASM OF UPPER-OUTER QUADRANT OF LEFT BREAST IN FEMALE, ESTROGEN RECEPTOR NEGATIVE: ICD-10-CM

## 2023-06-13 PROCEDURE — 99999 PR PBB SHADOW E&M-EST. PATIENT-LVL II: ICD-10-PCS | Mod: PBBFAC,,, | Performed by: SURGERY

## 2023-06-13 PROCEDURE — 99212 OFFICE O/P EST SF 10 MIN: CPT | Mod: PBBFAC | Performed by: SURGERY

## 2023-06-13 PROCEDURE — 99214 OFFICE O/P EST MOD 30 MIN: CPT | Mod: S$PBB,,, | Performed by: SURGERY

## 2023-06-13 PROCEDURE — 99999 PR PBB SHADOW E&M-EST. PATIENT-LVL II: CPT | Mod: PBBFAC,,, | Performed by: SURGERY

## 2023-06-13 PROCEDURE — 99214 PR OFFICE/OUTPT VISIT, EST, LEVL IV, 30-39 MIN: ICD-10-PCS | Mod: S$PBB,,, | Performed by: SURGERY

## 2023-06-13 NOTE — PROGRESS NOTES
Plastic Surgery History & Physical    SUBJECTIVE:   Chief complaint: Preoperative visit for revision of autologous breast reconstruction    History of Present Illness:  57 y.o. female presenting to plastic surgery clinic for a preoperative visit. Patient has a history of triple negative left breast ca s/p bilateral mastectomy with immediate JUNAID flap reconstruction on 23. She completed adjuvant chemotherapy. Her current complaints about her reconstruction include a rounded appearance to her abdomen, high riding abdominal incision, deflated appearance of her breast flaps and bilateral dog ears.  At the patient's last visit we discussed addressing her concerns related to her abdominal contour and tailoring of the breasts reconstruction.     Since the last clinic visit there have been no significant changes in the patient's history.    Past Medical History:   Diagnosis Date    Asthma     General anesthetics causing adverse effect in therapeutic use     Sleep apnea     wears mouth device       Past Surgical History:   Procedure Laterality Date    BILATERAL MASTECTOMY Bilateral 2023    Procedure: MASTECTOMY, BILATERAL;  Surgeon: Kim Jiménez MD;  Location: [facility];  Service: General;  Laterality: Bilateral;  2.5 HOURS / PATIENT WILL STAY OVERNIGHT    CERVICAL BIOPSY       SECTION      EXPLORATORY LAPAROTOMY      FOOT SURGERY      INJECTION FOR SENTINEL NODE IDENTIFICATION Left 2023    Procedure: INJECTION, FOR SENTINEL NODE IDENTIFICATION;  Surgeon: Kim Jiménez MD;  Location: [facility];  Service: General;  Laterality: Left;    MASTECTOMY WITH SENTINEL NODE BIOPSY AND AXILLARY LYMPH NODE DISSECTION Left 2023    Procedure: MASTECTOMY, WITH SENTINEL NODE BIOPSY AND AXILLARY LYMPHADENECTOMY;  Surgeon: Kim Jiménez MD;  Location: [facility];  Service: General;  Laterality: Left;    RECONSTRUCTION OF BREAST WITH DEEP INFERIOR EPIGASTRIC ARTERY  (JUNAID) FLAP Bilateral 2023    Procedure:  RECONSTRUCTION, BREAST, USING JUNAID SKIN FLAP;  Surgeon: Kevin Curry DO;  Location: [facility];  Service: General;  Laterality: Bilateral;  6 HRS    REDUCTION OF BOTH BREASTS Bilateral 2/25/2021    Procedure: breast reduction;  Surgeon: Kevin Diaz MD;  Location: [address];  Service: Plastics;  Laterality: Bilateral;  LEFT BREAST: 720G  RIGHT BREAST: 728G    SENTINEL LYMPH NODE BIOPSY Left 2/8/2023    Procedure: BIOPSY, LYMPH NODE, SENTINEL;  Surgeon: Kim Jiménez MD;  Location: [facility];  Service: General;  Laterality: Left;    TUBAL LIGATION         No family history on file.    Social History     Socioeconomic History    Marital status: Single   Tobacco Use    Smoking status: Never    Smokeless tobacco: Never   Substance and Sexual Activity    Alcohol use: Yes     Comment: socially     Drug use: Never    Sexual activity: Yes     Partners: Male     Social Determinants of Health     Financial Resource Strain: Low Risk     Difficulty of Paying Living Expenses: Not hard at all   Food Insecurity: No Food Insecurity    Worried About Running Out of Food in the Last Year: Never true    Ran Out of Food in the Last Year: Never true   Transportation Needs: No Transportation Needs    Lack of Transportation (Medical): No    Lack of Transportation (Non-Medical): No   Stress: No Stress Concern Present    Feeling of Stress : Not at all   Social Connections: Unknown    Frequency of Communication with Friends and Family: More than three times a week    Frequency of Social Gatherings with Friends and Family: More than three times a week    Marital Status: Never    Housing Stability: Low Risk     Unable to Pay for Housing in the Last Year: No    Number of Places Lived in the Last Year: 1    Unstable Housing in the Last Year: No       Current Outpatient Medications   Medication Sig Dispense Refill    albuterol (PROVENTIL/VENTOLIN HFA) 90 mcg/actuation inhaler INHALE 1 PUFF BY MOUTH FOUR TIMES A DAY AS  NEEDED      ALBUTEROL INHL Inhale into the lungs 4 (four) times daily as needed.      ascorbic acid, vitamin C, (VITAMIN C) 500 MG tablet 500 mg.      cyanocobalamin, vitamin B-12, 50 mcg tablet Take 50 mcg by mouth once daily.      dexAMETHasone (DECADRON) 4 MG Tab 2 tabs twice daily, the day before and the day after chemotherapy 40 tablet 0    dexAMETHasone (DECADRON) 4 MG Tab 2 tabs twice daily, the day before and the day after chemotherapy (Patient not taking: Reported on 4/6/2023) 40 tablet 0    fluconazole (DIFLUCAN) 150 MG Tab 150 mg.      hydrocortisone 2.5 % cream Apply topically 2 (two) times daily. 20 g 0    hydrOXYzine (ATARAX) 50 MG tablet 50 mg.      LIDOcaine-prilocaine (EMLA) cream Apply topically as needed. 30 g 3    methocarbamoL (ROBAXIN) 500 MG Tab TAKE 1 TO 2 TABLETS BY MOUTH THREE TIMES A DAY AS NEEDED FOR MUSCLE SPASMS      oxyCODONE (OXY-IR) 5 mg Cap Take 1 capsule (5 mg total) by mouth every 4 (four) hours as needed for Pain. (Patient not taking: Reported on 3/7/2023) 10 capsule 0    vitamin D (VITAMIN D3) 1000 units Tab Take 1,000 Units by mouth once daily.       No current facility-administered medications for this visit.     Facility-Administered Medications Ordered in Other Visits   Medication Dose Route Frequency Provider Last Rate Last Admin    LIDOcaine (PF) 10 mg/ml (1%) injection 10 mg  1 mL Intradermal Once Bee Hernandez PA-C        mupirocin 2 % ointment   Nasal On Call Procedure Bee Hernandez PA-C   Given at 02/08/23 0617    sodium chloride 0.9% flush 10 mL  10 mL Intravenous PRN Bee Hernandez PA-C           Review of patient's allergies indicates:   Allergen Reactions    Nitrofurantoin monohyd/m-cryst Hives    Sulfamethoxazole-trimethoprim Anaphylaxis, Itching, Rash, Swelling and Hives    [facility]extrom[facility]orphan      [facility]    Macrodantin [nitrofurantoin macrocrystalline]     Rifampin      [facility]    Sulfa (sulfonamide antibiotics)            OBJECTIVE:     BP  124/69   Pulse 86       Physical Exam:  Gen: NAD, appears stated age  Neuro: normal without focal findings, mental status and speech normal  HEENT: NCAT, neck supple, PEERL  CV: RRR  Pulm: Breathing non-labored, chest wall movement equal bilaterally   Breast: Exam deferred, reviewed medical photography  Abdomen: soft, non-tender, upper abdomen full and round.  Incision is slightly high.  Gu: not examined  Extremity:normal strength, no cyanosis or edema  Skin: Skin color, texture, turgor normal. No rashes or lesions  Psych: oriented to time, place and person          ASSESSMENT/PLAN:     Trinity Morin was seen preoperatively today for revision of flap reconstruction.  The plan for her is revision of the skin of both breasts.  I expect to remove all of the right skin paddle most of the left skin paddle.  We would do Lipo of the upper abdomen flanks.  Fat grafting to both breasts make them a little larger and sized more symmetric.  She would a chronic seroma cavity in the lower midline.  We will excise this and lower her abdominal scar.  We discussed floating her umbilicus which will bring her belly button to a little bit lower position.  She expressed her understanding.    Discussed details of surgical procedure and reviewed risks of surgery including bleeding, seroma, hematoma, infection, poor wound healing, wound breakdown, poor cosmetic outcome, need for reoperation, changes to sensation, fat necrosis. Risks of liposuction include hematoma, seroma, contour deformity, dimpling, displeasing cosmesis. Surgical consents obtained. Counseled on OSBALDO drain use in the event drains are needed. Advised patient to wear post operative garment as instructed and that we typically recommend garments after liposuction be worn for 8 weeks.    Reviewed multimodal pain control regimen used in the post operative period. Prescriptions will be sent to the outpatient pharmacy the day of surgery.  The patient was given a packet  including general instructions for post-operative care, instructions for the day of surgery, drain care and process to complete FMLA/Disability paperwork.  All questions were answered. The patient was given a business card with contact info and advised to contact the clinic with any questions or concerns before or after surgery.      Garcia Curry, DO  Plastic and Reconstructive Surgery    I spent a total of 30 minutes on the day of the visit.  This includes face to face time and non-face to face time preparing to see the patient (eg, review of tests), obtaining and/or reviewing separately obtained history, documenting clinical information in the electronic or other health record, independently interpreting results and communicating results to the patient/family/caregiver, or care coordinator.

## 2023-06-13 NOTE — H&P (VIEW-ONLY)
Plastic Surgery History & Physical    SUBJECTIVE:   Chief complaint: Preoperative visit for revision of autologous breast reconstruction    History of Present Illness:  57 y.o. female presenting to plastic surgery clinic for a preoperative visit. Patient has a history of triple negative left breast ca s/p bilateral mastectomy with immediate JUNAID flap reconstruction on 23. She completed adjuvant chemotherapy. Her current complaints about her reconstruction include a rounded appearance to her abdomen, high riding abdominal incision, deflated appearance of her breast flaps and bilateral dog ears.  At the patient's last visit we discussed addressing her concerns related to her abdominal contour and tailoring of the breasts reconstruction.     Since the last clinic visit there have been no significant changes in the patient's history.    Past Medical History:   Diagnosis Date    Asthma     General anesthetics causing adverse effect in therapeutic use     Sleep apnea     wears mouth device       Past Surgical History:   Procedure Laterality Date    BILATERAL MASTECTOMY Bilateral 2023    Procedure: MASTECTOMY, BILATERAL;  Surgeon: Kim Jiménez MD;  Location: [facility];  Service: General;  Laterality: Bilateral;  2.5 HOURS / PATIENT WILL STAY OVERNIGHT    CERVICAL BIOPSY       SECTION      EXPLORATORY LAPAROTOMY      FOOT SURGERY      INJECTION FOR SENTINEL NODE IDENTIFICATION Left 2023    Procedure: INJECTION, FOR SENTINEL NODE IDENTIFICATION;  Surgeon: Kim Jiménez MD;  Location: [facility];  Service: General;  Laterality: Left;    MASTECTOMY WITH SENTINEL NODE BIOPSY AND AXILLARY LYMPH NODE DISSECTION Left 2023    Procedure: MASTECTOMY, WITH SENTINEL NODE BIOPSY AND AXILLARY LYMPHADENECTOMY;  Surgeon: Kim Jiménez MD;  Location: [facility];  Service: General;  Laterality: Left;    RECONSTRUCTION OF BREAST WITH DEEP INFERIOR EPIGASTRIC ARTERY  (JUNAID) FLAP Bilateral 2023    Procedure:  RECONSTRUCTION, BREAST, USING JUNAID SKIN FLAP;  Surgeon: Kevin Curry DO;  Location: [facility];  Service: General;  Laterality: Bilateral;  6 HRS    REDUCTION OF BOTH BREASTS Bilateral 2/25/2021    Procedure: breast reduction;  Surgeon: Kevin Diaz MD;  Location: [address];  Service: Plastics;  Laterality: Bilateral;  LEFT BREAST: 720G  RIGHT BREAST: 728G    SENTINEL LYMPH NODE BIOPSY Left 2/8/2023    Procedure: BIOPSY, LYMPH NODE, SENTINEL;  Surgeon: Kim Jiménez MD;  Location: [facility];  Service: General;  Laterality: Left;    TUBAL LIGATION         No family history on file.    Social History     Socioeconomic History    Marital status: Single   Tobacco Use    Smoking status: Never    Smokeless tobacco: Never   Substance and Sexual Activity    Alcohol use: Yes     Comment: socially     Drug use: Never    Sexual activity: Yes     Partners: Male     Social Determinants of Health     Financial Resource Strain: Low Risk     Difficulty of Paying Living Expenses: Not hard at all   Food Insecurity: No Food Insecurity    Worried About Running Out of Food in the Last Year: Never true    Ran Out of Food in the Last Year: Never true   Transportation Needs: No Transportation Needs    Lack of Transportation (Medical): No    Lack of Transportation (Non-Medical): No   Stress: No Stress Concern Present    Feeling of Stress : Not at all   Social Connections: Unknown    Frequency of Communication with Friends and Family: More than three times a week    Frequency of Social Gatherings with Friends and Family: More than three times a week    Marital Status: Never    Housing Stability: Low Risk     Unable to Pay for Housing in the Last Year: No    Number of Places Lived in the Last Year: 1    Unstable Housing in the Last Year: No       Current Outpatient Medications   Medication Sig Dispense Refill    albuterol (PROVENTIL/VENTOLIN HFA) 90 mcg/actuation inhaler INHALE 1 PUFF BY MOUTH FOUR TIMES A DAY AS  NEEDED      ALBUTEROL INHL Inhale into the lungs 4 (four) times daily as needed.      ascorbic acid, vitamin C, (VITAMIN C) 500 MG tablet 500 mg.      cyanocobalamin, vitamin B-12, 50 mcg tablet Take 50 mcg by mouth once daily.      dexAMETHasone (DECADRON) 4 MG Tab 2 tabs twice daily, the day before and the day after chemotherapy 40 tablet 0    dexAMETHasone (DECADRON) 4 MG Tab 2 tabs twice daily, the day before and the day after chemotherapy (Patient not taking: Reported on 4/6/2023) 40 tablet 0    fluconazole (DIFLUCAN) 150 MG Tab 150 mg.      hydrocortisone 2.5 % cream Apply topically 2 (two) times daily. 20 g 0    hydrOXYzine (ATARAX) 50 MG tablet 50 mg.      LIDOcaine-prilocaine (EMLA) cream Apply topically as needed. 30 g 3    methocarbamoL (ROBAXIN) 500 MG Tab TAKE 1 TO 2 TABLETS BY MOUTH THREE TIMES A DAY AS NEEDED FOR MUSCLE SPASMS      oxyCODONE (OXY-IR) 5 mg Cap Take 1 capsule (5 mg total) by mouth every 4 (four) hours as needed for Pain. (Patient not taking: Reported on 3/7/2023) 10 capsule 0    vitamin D (VITAMIN D3) 1000 units Tab Take 1,000 Units by mouth once daily.       No current facility-administered medications for this visit.     Facility-Administered Medications Ordered in Other Visits   Medication Dose Route Frequency Provider Last Rate Last Admin    LIDOcaine (PF) 10 mg/ml (1%) injection 10 mg  1 mL Intradermal Once Bee Hernandez PA-C        mupirocin 2 % ointment   Nasal On Call Procedure Bee Hernandez PA-C   Given at 02/08/23 0617    sodium chloride 0.9% flush 10 mL  10 mL Intravenous PRN Bee Hernandez PA-C           Review of patient's allergies indicates:   Allergen Reactions    Nitrofurantoin monohyd/m-cryst Hives    Sulfamethoxazole-trimethoprim Anaphylaxis, Itching, Rash, Swelling and Hives    Dextromethorphan      [facility]    Macrodantin [nitrofurantoin macrocrystalline]     Rifampin      [facility]    Sulfa (sulfonamide antibiotics)            OBJECTIVE:     BP  124/69   Pulse 86       Physical Exam:  Gen: NAD, appears stated age  Neuro: normal without focal findings, mental status and speech normal  HEENT: NCAT, neck supple, PEERL  CV: RRR  Pulm: Breathing non-labored, chest wall movement equal bilaterally   Breast: Exam deferred, reviewed medical photography  Abdomen: soft, non-tender, upper abdomen full and round.  Incision is slightly high.  Gu: not examined  Extremity:normal strength, no cyanosis or edema  Skin: Skin color, texture, turgor normal. No rashes or lesions  Psych: oriented to time, place and person          ASSESSMENT/PLAN:     Trinity Morin was seen preoperatively today for revision of flap reconstruction.  The plan for her is revision of the skin of both breasts.  I expect to remove all of the right skin paddle most of the left skin paddle.  We would do Lipo of the upper abdomen flanks.  Fat grafting to both breasts make them a little larger and sized more symmetric.  She would a chronic seroma cavity in the lower midline.  We will excise this and lower her abdominal scar.  We discussed floating her umbilicus which will bring her belly button to a little bit lower position.  She expressed her understanding.    Discussed details of surgical procedure and reviewed risks of surgery including bleeding, seroma, hematoma, infection, poor wound healing, wound breakdown, poor cosmetic outcome, need for reoperation, changes to sensation, fat necrosis. Risks of liposuction include hematoma, seroma, contour deformity, dimpling, displeasing cosmesis. Surgical consents obtained. Counseled on OSBALDO drain use in the event drains are needed. Advised patient to wear post operative garment as instructed and that we typically recommend garments after liposuction be worn for 8 weeks.    Reviewed multimodal pain control regimen used in the post operative period. Prescriptions will be sent to the outpatient pharmacy the day of surgery.  The patient was given a packet  including general instructions for post-operative care, instructions for the day of surgery, drain care and process to complete FMLA/Disability paperwork.  All questions were answered. The patient was given a business card with contact info and advised to contact the clinic with any questions or concerns before or after surgery.      Garcia Curry, DO  Plastic and Reconstructive Surgery    I spent a total of 30 minutes on the day of the visit.  This includes face to face time and non-face to face time preparing to see the patient (eg, review of tests), obtaining and/or reviewing separately obtained history, documenting clinical information in the electronic or other health record, independently interpreting results and communicating results to the patient/family/caregiver, or care coordinator.

## 2023-06-14 ENCOUNTER — TELEPHONE (OUTPATIENT)
Dept: PLASTIC SURGERY | Facility: CLINIC | Age: 58
End: 2023-06-14
Payer: OTHER GOVERNMENT

## 2023-06-14 NOTE — TELEPHONE ENCOUNTER
Called placed to pt -Dr. Curry would like to have some post op photos done of the abdomen and breast - photos scheduled tomorrow at 11 am - Details of location/time given=  pt active on the portal and confirmed time and location of appt - Pt voiced understanding

## 2023-06-24 ENCOUNTER — HOSPITAL ENCOUNTER (EMERGENCY)
Facility: HOSPITAL | Age: 58
Discharge: HOME | End: 2023-06-24
Attending: EMERGENCY MEDICINE
Payer: OTHER GOVERNMENT

## 2023-06-24 VITALS
BODY MASS INDEX: 28.22 KG/M2 | HEART RATE: 95 BPM | WEIGHT: 140 LBS | RESPIRATION RATE: 20 BRPM | DIASTOLIC BLOOD PRESSURE: 85 MMHG | HEIGHT: 59 IN | SYSTOLIC BLOOD PRESSURE: 128 MMHG | OXYGEN SATURATION: 97 % | TEMPERATURE: 98 F

## 2023-06-24 DIAGNOSIS — U07.1 COVID-19: Primary | ICD-10-CM

## 2023-06-24 DIAGNOSIS — R09.81 NASAL CONGESTION: ICD-10-CM

## 2023-06-24 PROCEDURE — 99282 EMERGENCY DEPT VISIT SF MDM: CPT | Mod: 25,ER

## 2023-06-24 PROCEDURE — 25000003 PHARM REV CODE 250: Mod: ER | Performed by: EMERGENCY MEDICINE

## 2023-06-24 PROCEDURE — 30901 CONTROL OF NOSEBLEED: CPT | Mod: ER

## 2023-06-24 RX ORDER — OXYMETAZOLINE HCL 0.05 %
1 SPRAY, NON-AEROSOL (ML) NASAL 2 TIMES DAILY PRN
Qty: 15 ML | Refills: 0 | Status: SHIPPED | OUTPATIENT
Start: 2023-06-24 | End: 2023-06-27

## 2023-06-24 RX ADMIN — PHENYLEPHRINE HYDROCHLORIDE 1 SPRAY: 0.5 SPRAY NASAL at 05:06

## 2023-06-24 NOTE — DISCHARGE INSTRUCTIONS
Do not use the Afrin for more than three days total.   Recommend Dayquild as needed for daytime nasal congestion.   Follow up with your primary care provider.

## 2023-06-24 NOTE — ED TRIAGE NOTES
Trinity Morin, a 57 y.o. female presents to the ED w/ complaint of unable to breath through nose after being diagnosed with covid and bronchitis. She denies any chest pain, sob and any other symptoms.     Triage note:  Chief Complaint   Patient presents with    Nasal Congestion    SINUS CONGESTION     PT REPORTS TESTED POSITIVE FOR COVID ON Wednesday AND WAS GIVEN ABX FOR BRONCHITIS, REPORTS SEVERE NASAL CONGESTION GETTING WORSE     Review of patient's allergies indicates:   Allergen Reactions    Nitrofurantoin monohyd/m-cryst Hives    Sulfamethoxazole-trimethoprim Anaphylaxis, Itching, Rash, Swelling and Hives    Dextromethorphan      [facility]    Macrodantin [nitrofurantoin macrocrystalline]     Rifampin      [facility]    Sulfa (sulfonamide antibiotics)      Past Medical History:   Diagnosis Date    Asthma     General anesthetics causing adverse effect in therapeutic use     Sleep apnea     wears mouth device

## 2023-06-25 NOTE — ED PROVIDER NOTES
Encounter Date: 2023       History     Chief Complaint   Patient presents with    Nasal Congestion    SINUS CONGESTION     PT REPORTS TESTED POSITIVE FOR COVID ON Wednesday AND WAS GIVEN ABX FOR BRONCHITIS, REPORTS SEVERE NASAL CONGESTION GETTING WORSE     57-year-old female presenting with nasal congestion.  Patient reports she was recently diagnosed with COVID-19.  Patient reports difficulty sleeping due to nasal congestion.  Denies any dyspnea or chest pain.  Denies any myalgias.  Patient looking for relief from the nasal congestion.    Review of patient's allergies indicates:   Allergen Reactions    Nitrofurantoin monohyd/m-cryst Hives    Sulfamethoxazole-trimethoprim Anaphylaxis, Itching, Rash, Swelling and Hives    Dextromethorphan      [facility]    Macrodantin [nitrofurantoin macrocrystalline]     Rifampin      [facility]    Sulfa (sulfonamide antibiotics)      Past Medical History:   Diagnosis Date    Asthma     General anesthetics causing adverse effect in therapeutic use     Sleep apnea     wears mouth device     Past Surgical History:   Procedure Laterality Date    BILATERAL MASTECTOMY Bilateral 2023    Procedure: MASTECTOMY, BILATERAL;  Surgeon: Kim Jiménez MD;  Location: [facility];  Service: General;  Laterality: Bilateral;  2.5 HOURS / PATIENT WILL STAY OVERNIGHT    CERVICAL BIOPSY       SECTION      EXPLORATORY LAPAROTOMY      FOOT SURGERY      INJECTION FOR SENTINEL NODE IDENTIFICATION Left 2023    Procedure: INJECTION, FOR SENTINEL NODE IDENTIFICATION;  Surgeon: Kim Jiménez MD;  Location: [facility];  Service: General;  Laterality: Left;    MASTECTOMY WITH SENTINEL NODE BIOPSY AND AXILLARY LYMPH NODE DISSECTION Left 2023    Procedure: MASTECTOMY, WITH SENTINEL NODE BIOPSY AND AXILLARY LYMPHADENECTOMY;  Surgeon: Kim Jiménez MD;  Location: [facility];  Service: General;  Laterality: Left;    RECONSTRUCTION OF BREAST WITH DEEP INFERIOR EPIGASTRIC ARTERY   (JUNAID) FLAP Bilateral 2/8/2023    Procedure: RECONSTRUCTION, BREAST, USING JUNAID SKIN FLAP;  Surgeon: Kevin Curry DO;  Location: [facility];  Service: General;  Laterality: Bilateral;  6 HRS    REDUCTION OF BOTH BREASTS Bilateral 2/25/2021    Procedure: breast reduction;  Surgeon: Kevin Diaz MD;  Location: [address];  Service: Plastics;  Laterality: Bilateral;  LEFT BREAST: 720G  RIGHT BREAST: 728G    SENTINEL LYMPH NODE BIOPSY Left 2/8/2023    Procedure: BIOPSY, LYMPH NODE, SENTINEL;  Surgeon: Kim Jiménez MD;  Location: [facility];  Service: General;  Laterality: Left;    TUBAL LIGATION       No family history on file.  Social History     Tobacco Use    Smoking status: Never    Smokeless tobacco: Never   Substance Use Topics    Alcohol use: Yes     Comment: socially     Drug use: Never     Review of Systems   Constitutional:  Negative for chills and fever.   HENT:  Positive for congestion and postnasal drip. Negative for sore throat.    Respiratory:  Positive for cough. Negative for shortness of breath.    Cardiovascular:  Negative for chest pain.   Gastrointestinal:  Negative for nausea and vomiting.   Skin:  Negative for rash.   Neurological:  Negative for headaches.   Psychiatric/Behavioral:  Negative for confusion.      Physical Exam     Initial Vitals [06/24/23 0457]   BP Pulse Resp Temp SpO2   128/85 95 20 98.2 °F (36.8 °C) 97 %      MAP       --         Physical Exam    Nursing note and vitals reviewed.  Constitutional: She appears well-developed and well-nourished. She does not appear ill. No distress.   HENT:   Head: Normocephalic and atraumatic.   Nose: No rhinorrhea.   Mouth/Throat: Uvula is midline and oropharynx is clear and moist. No oropharyngeal exudate, posterior oropharyngeal edema or posterior oropharyngeal erythema.   Eyes: Conjunctivae and EOM are normal.   Neck:   Normal range of motion.  Cardiovascular:  Regular rhythm and normal heart sounds.   Tachycardia present.          No murmur heard.  Pulmonary/Chest: Breath sounds normal. No respiratory distress. She has no wheezes.   Abdominal: Abdomen is soft. There is no abdominal tenderness.   Musculoskeletal:         General: No edema.      Cervical back: Normal range of motion.     Neurological: She is alert. GCS score is 15.   Skin: Skin is warm.   Psychiatric: She has a normal mood and affect.       ED Course   Procedures  Labs Reviewed - No data to display       Imaging Results    None          Medications   phenylephrine HCL 0.5% nasal spray 1 spray (1 spray Each Nostril Given 6/24/23 0528)     Medical Decision Making:   Initial Assessment:   57 year old female presenting with nasal congestion and setting COVID-19.  Patient non ill-appearing.  Clear auscultation on lung exam.  Recommended using Afrin for the next 2-3 days for nasal congestion.  Recommend using DayQuil during daytime.  Patient otherwise can follow up with primary care.                        Clinical Impression:   Final diagnoses:  [U07.1] COVID-19 (Primary)  [R09.81] Nasal congestion        ED Disposition Condition    Discharge Stable          ED Prescriptions       Medication Sig Dispense Start Date End Date Auth. Provider    oxymetazoline (AFRIN) 0.05 % nasal spray 1 spray by Nasal route 2 (two) times daily as needed for Congestion. 15 mL 6/24/2023 6/27/2023 Zia Justice MD          Follow-up Information       Follow up With Specialties Details Why Contact Info    Lorena Fuentes MD Internal Medicine Schedule an appointment as soon as possible for a visit in 2 days Primary care [address] [facility] [address] [phone]      [facility] ED Emergency Medicine  If symptoms worsen [address] [facility] [address] [phone]             Zia Justice MD  06/24/23 1005

## 2023-06-27 ENCOUNTER — ANESTHESIA EVENT (OUTPATIENT)
Dept: SURGERY | Facility: HOSPITAL | Age: 58
End: 2023-06-27
Payer: OTHER GOVERNMENT

## 2023-06-27 NOTE — ANESTHESIA PREPROCEDURE EVALUATION
2023  Trinity Morin is a 57 y.o., female.      Pre-op Assessment    I have reviewed the Patient Summary Reports.     I have reviewed the Nursing Notes. I have reviewed the NPO Status.   I have reviewed the Medications.     Review of Systems  Anesthesia Hx:  Denies Family Hx of Anesthesia complications.  Personal Hx of Anesthesia complications Slow To Awaken/Delayed Emergence and mild, somewhat sensitive to sedation / narcotics   Social:  Non-Smoker, Social Alcohol Use    Hematology/Oncology:         -- Anemia: Current/Recent Cancer. Breast left surgery   EENT/Dental:   Soft palate abn bony growth chronic   Under the tongue growth nodular BL chronic   Cardiovascular:   Dysrhythmias (sinus bradycardia)    Pulmonary:   Asthma (rare inhaler) mild Recent URI (bronchitis 2 weeks ago,s/p Tx), resolved Sleep Apnea (mouthpiece) H/o bronchitis   Renal/:  Renal/ Normal     Hepatic/GI:   GERD (occ)    Musculoskeletal:   RLE numbness occ  Stiff neck Spine Disorders: cervical and lumbar Chronic Pain    Neurological:  Neurology Normal    Endocrine:  Endocrine Normal    Dermatological:  Skin Normal    Psych:  Psychiatric Normal         Asthma Sleep apnea   Bronchitis GERD (gastroesophageal reflux disease)     Surgical History     SECTION TUBAL LIGATION   EXPLORATORY LAPAROTOMY CERVICAL BIOPSY   FOOT SURGERY REDUCTION OF BOTH BREASTS   BILATERAL MASTECTOMY SENTINEL LYMPH NODE BIOPSY   INJECTION FOR SENTINEL NODE IDENTIFICATION MASTECTOMY WITH SENTINEL NODE BIOPSY AND AXILLARY LYMPH NODE DISSECTION   RECONSTRUCTION OF BREAST WITH DEEP INFERIOR EPIGASTRIC ARTERY  (JUNAID) FLAP                 Past Surgical History:   Procedure Laterality Date    BILATERAL MASTECTOMY Bilateral 2023     Procedure: MASTECTOMY, BILATERAL;  Surgeon: Kim Jiménez MD;  Location: [facility];  Service: General;   Laterality: Bilateral;  2.5 HOURS / PATIENT WILL STAY OVERNIGHT    CERVICAL BIOPSY         SECTION        EXPLORATORY LAPAROTOMY        FOOT SURGERY        INJECTION FOR SENTINEL NODE IDENTIFICATION Left 2023     Procedure: INJECTION, FOR SENTINEL NODE IDENTIFICATION;  Surgeon: Kim Jiménez MD;  Location: [facility];  Service: General;  Laterality: Left;    MASTECTOMY WITH SENTINEL NODE BIOPSY AND AXILLARY LYMPH NODE DISSECTION Left 2023     Procedure: MASTECTOMY, WITH SENTINEL NODE BIOPSY AND AXILLARY LYMPHADENECTOMY;  Surgeon: Kim Jiménez MD;  Location:          Physical Exam  General: Well nourished, Cooperative, Alert and Oriented    Airway:  Mallampati: II   Mouth Opening: Normal  TM Distance: Normal  Neck ROM: Normal ROM    Dental:  Intact  Soft palate deformity   under the tongue Bl deformity nodular      Anesthesia Plan  Type of Anesthesia, risks & benefits discussed:    Anesthesia Type: Gen ETT  Intra-op Monitoring Plan: Standard ASA Monitors  Post Op Pain Control Plan: multimodal analgesia and IV/PO Opioids PRN  Induction:  IV  Airway Plan: Video  Informed Consent: Informed consent signed with the Patient and all parties understand the risks and agree with anesthesia plan.  All questions answered.   ASA Score: 2  Day of Surgery Review of History & Physical: H&P Update referred to the surgeon/provider.I have interviewed and examined the patient. I have reviewed the patient's H&P dated: There are no significant changes. H&P completed by Anesthesiologist.    Ready For Surgery From Anesthesia Perspective.     .

## 2023-06-28 ENCOUNTER — PATIENT MESSAGE (OUTPATIENT)
Dept: SURGERY | Facility: HOSPITAL | Age: 58
End: 2023-06-28
Payer: OTHER GOVERNMENT

## 2023-07-03 ENCOUNTER — TELEPHONE (OUTPATIENT)
Dept: PODIATRY | Facility: CLINIC | Age: 58
End: 2023-07-03
Payer: OTHER GOVERNMENT

## 2023-07-03 RX ORDER — IBUPROFEN 600 MG/1
600 TABLET ORAL EVERY 6 HOURS
Qty: 28 TABLET | Refills: 0 | Status: SHIPPED | OUTPATIENT
Start: 2023-07-03 | End: 2023-07-10

## 2023-07-03 RX ORDER — METHOCARBAMOL 500 MG/1
500 TABLET, FILM COATED ORAL 3 TIMES DAILY
Qty: 20 TABLET | Refills: 0 | Status: SHIPPED | OUTPATIENT
Start: 2023-07-03 | End: 2023-07-10

## 2023-07-03 RX ORDER — GABAPENTIN 100 MG/1
300 CAPSULE ORAL 3 TIMES DAILY
Qty: 63 CAPSULE | Refills: 0 | Status: SHIPPED | OUTPATIENT
Start: 2023-07-03 | End: 2023-07-10

## 2023-07-03 RX ORDER — OXYCODONE HYDROCHLORIDE 5 MG/1
5 TABLET ORAL EVERY 4 HOURS PRN
Qty: 10 TABLET | Refills: 0 | Status: SHIPPED | OUTPATIENT
Start: 2023-07-03 | End: 2023-12-13

## 2023-07-03 NOTE — TELEPHONE ENCOUNTER
Called the pt to inform her of her new appointment no answer was only able to send a sms notification.    Lu GARCIA

## 2023-07-03 NOTE — PRE-PROCEDURE INSTRUCTIONS
Following instructions given via phone:    On the day of surgery please report to [facility] located at [address] [place] [address].  Please park in the lot in front of the building and enter at the main entrance. Proceed to the second floor for registration.    Arrival time: 0700    You may not drive home after your procedure. You may not leave alone in an uber, taxi, etc.  You must be discharged to a responsible adult. You must remain under adult supervision for 24 hours.   If possible, please have your visitor &/or ride home stay during your visit.   The surgeon should speak with your visitors after your procedure.  All visitors must be 18 years of age or older. Please limit your visitors to max of 2 people.    No food, no drink after midnight.    Take the following medications the morning of your procedure: see med list, take with water      If applicable, do not shave the surgical site 5 days prior to your procedure.  Shower the night before and the morning of your procedure with antibacterial soap.  Do not apply any products to your skin nor your hair after you shower the morning of your procedure.  No lotion, no oils, no powders,  Wear loose, comfortable clothing.  No jewelry. No contacts. Bring glasses if necessary.  If you have dentures, bring a case.  Leave all valuables at home.

## 2023-07-05 ENCOUNTER — HOSPITAL ENCOUNTER (OUTPATIENT)
Facility: HOSPITAL | Age: 58
Discharge: HOME | End: 2023-07-05
Attending: SURGERY | Admitting: SURGERY
Payer: OTHER GOVERNMENT

## 2023-07-05 ENCOUNTER — ANESTHESIA (OUTPATIENT)
Dept: SURGERY | Facility: HOSPITAL | Age: 58
End: 2023-07-05
Payer: OTHER GOVERNMENT

## 2023-07-05 VITALS
OXYGEN SATURATION: 97 % | HEART RATE: 64 BPM | DIASTOLIC BLOOD PRESSURE: 55 MMHG | BODY MASS INDEX: 29.23 KG/M2 | WEIGHT: 145 LBS | TEMPERATURE: 97 F | RESPIRATION RATE: 13 BRPM | SYSTOLIC BLOOD PRESSURE: 110 MMHG | HEIGHT: 59 IN

## 2023-07-05 DIAGNOSIS — Z85.3 PERSONAL HISTORY OF BREAST CANCER: ICD-10-CM

## 2023-07-05 DIAGNOSIS — Z17.1 MALIGNANT NEOPLASM OF UPPER-OUTER QUADRANT OF LEFT BREAST IN FEMALE, ESTROGEN RECEPTOR NEGATIVE: Primary | ICD-10-CM

## 2023-07-05 DIAGNOSIS — C50.412 MALIGNANT NEOPLASM OF UPPER-OUTER QUADRANT OF LEFT BREAST IN FEMALE, ESTROGEN RECEPTOR NEGATIVE: Primary | ICD-10-CM

## 2023-07-05 PROCEDURE — 25000242 PHARM REV CODE 250 ALT 637 W/ HCPCS: Performed by: ANESTHESIOLOGY

## 2023-07-05 PROCEDURE — 19380 REVJ RECONSTRUCTED BREAST: CPT | Mod: 50,,, | Performed by: SURGERY

## 2023-07-05 PROCEDURE — 00402 ANES INTEG SYS RCNSTV BREAST: CPT | Performed by: SURGERY

## 2023-07-05 PROCEDURE — 11404 EXC TR-EXT B9+MARG 3.1-4 CM: CPT | Mod: ,,, | Performed by: SURGERY

## 2023-07-05 PROCEDURE — 36000706: Performed by: SURGERY

## 2023-07-05 PROCEDURE — 63600175 PHARM REV CODE 636 W HCPCS: Performed by: ANESTHESIOLOGY

## 2023-07-05 PROCEDURE — 27201423 OPTIME MED/SURG SUP & DEVICES STERILE SUPPLY: Performed by: SURGERY

## 2023-07-05 PROCEDURE — 94640 AIRWAY INHALATION TREATMENT: CPT

## 2023-07-05 PROCEDURE — 25000003 PHARM REV CODE 250: Performed by: PHYSICIAN ASSISTANT

## 2023-07-05 PROCEDURE — 25000003 PHARM REV CODE 250: Performed by: ANESTHESIOLOGY

## 2023-07-05 PROCEDURE — 37000008 HC ANESTHESIA 1ST 15 MINUTES: Performed by: SURGERY

## 2023-07-05 PROCEDURE — D9220A PRA ANESTHESIA: Mod: CRNA,,, | Performed by: NURSE ANESTHETIST, CERTIFIED REGISTERED

## 2023-07-05 PROCEDURE — 71000039 HC RECOVERY, EACH ADD'L HOUR: Performed by: SURGERY

## 2023-07-05 PROCEDURE — 15772 PR GRAFTING, FAT, AUTO, LIPO, TRUNK/EXTR, EA ADDTL 50 CC: ICD-10-PCS | Mod: ,,, | Performed by: SURGERY

## 2023-07-05 PROCEDURE — 12032 PR LAYR CLOS WND TRUNK,ARM,LEG 2.6-7.5 CM: ICD-10-PCS | Mod: 51,59,, | Performed by: SURGERY

## 2023-07-05 PROCEDURE — D9220A PRA ANESTHESIA: ICD-10-PCS | Mod: ANES,,, | Performed by: ANESTHESIOLOGY

## 2023-07-05 PROCEDURE — 63600175 PHARM REV CODE 636 W HCPCS: Performed by: SURGERY

## 2023-07-05 PROCEDURE — 71000033 HC RECOVERY, INTIAL HOUR: Performed by: SURGERY

## 2023-07-05 PROCEDURE — 36000707: Performed by: SURGERY

## 2023-07-05 PROCEDURE — 99900035 HC TECH TIME PER 15 MIN (STAT)

## 2023-07-05 PROCEDURE — 63600175 PHARM REV CODE 636 W HCPCS: Performed by: NURSE ANESTHETIST, CERTIFIED REGISTERED

## 2023-07-05 PROCEDURE — 37000009 HC ANESTHESIA EA ADD 15 MINS: Performed by: SURGERY

## 2023-07-05 PROCEDURE — 11404 PR EXC SKIN BENIG 3.1-4 CM TRUNK,ARM,LEG: ICD-10-PCS | Mod: ,,, | Performed by: SURGERY

## 2023-07-05 PROCEDURE — 12032 INTMD RPR S/A/T/EXT 2.6-7.5: CPT | Mod: 51,59,, | Performed by: SURGERY

## 2023-07-05 PROCEDURE — 15771 PR GRAFTING, FAT, AUTO, LIPO, TRUNK/EXTR, <= 50 CC: ICD-10-PCS | Mod: ,,, | Performed by: SURGERY

## 2023-07-05 PROCEDURE — 25000003 PHARM REV CODE 250: Performed by: NURSE ANESTHETIST, CERTIFIED REGISTERED

## 2023-07-05 PROCEDURE — 94761 N-INVAS EAR/PLS OXIMETRY MLT: CPT

## 2023-07-05 PROCEDURE — 71000015 HC POSTOP RECOV 1ST HR: Performed by: SURGERY

## 2023-07-05 PROCEDURE — 11403 PR EXC SKIN BENIG 2.1-3 CM TRUNK,ARM,LEG: ICD-10-PCS | Mod: ,,, | Performed by: SURGERY

## 2023-07-05 PROCEDURE — 63600175 PHARM REV CODE 636 W HCPCS: Performed by: PHYSICIAN ASSISTANT

## 2023-07-05 PROCEDURE — 15771 GRFG AUTOL FAT LIPO 50 CC/<: CPT | Mod: ,,, | Performed by: SURGERY

## 2023-07-05 PROCEDURE — D9220A PRA ANESTHESIA: Mod: ANES,,, | Performed by: ANESTHESIOLOGY

## 2023-07-05 PROCEDURE — 19380 PR REVISE BREAST RECONSTRUCTION: ICD-10-PCS | Mod: 50,,, | Performed by: SURGERY

## 2023-07-05 PROCEDURE — D9220A PRA ANESTHESIA: ICD-10-PCS | Mod: CRNA,,, | Performed by: NURSE ANESTHETIST, CERTIFIED REGISTERED

## 2023-07-05 PROCEDURE — 25000003 PHARM REV CODE 250: Performed by: SURGERY

## 2023-07-05 PROCEDURE — 11403 EXC TR-EXT B9+MARG 2.1-3CM: CPT | Mod: ,,, | Performed by: SURGERY

## 2023-07-05 PROCEDURE — 15772 GRFG AUTOL FAT LIPO EA ADDL: CPT | Mod: ,,, | Performed by: SURGERY

## 2023-07-05 RX ORDER — FENTANYL CITRATE 50 UG/ML
INJECTION, SOLUTION INTRAMUSCULAR; INTRAVENOUS
Status: DISCONTINUED | OUTPATIENT
Start: 2023-07-05 | End: 2023-07-05

## 2023-07-05 RX ORDER — SODIUM CHLORIDE 9 MG/ML
INJECTION, SOLUTION INTRAVENOUS CONTINUOUS PRN
Status: COMPLETED | OUTPATIENT
Start: 2023-07-05 | End: 2023-07-05

## 2023-07-05 RX ORDER — MIDAZOLAM HYDROCHLORIDE 1 MG/ML
INJECTION, SOLUTION INTRAMUSCULAR; INTRAVENOUS
Status: DISCONTINUED | OUTPATIENT
Start: 2023-07-05 | End: 2023-07-05

## 2023-07-05 RX ORDER — PROPOFOL 10 MG/ML
VIAL (ML) INTRAVENOUS
Status: DISCONTINUED | OUTPATIENT
Start: 2023-07-05 | End: 2023-07-05

## 2023-07-05 RX ORDER — DEXAMETHASONE SODIUM PHOSPHATE 4 MG/ML
INJECTION, SOLUTION INTRA-ARTICULAR; INTRALESIONAL; INTRAMUSCULAR; INTRAVENOUS; SOFT TISSUE
Status: DISCONTINUED | OUTPATIENT
Start: 2023-07-05 | End: 2023-07-05

## 2023-07-05 RX ORDER — CEFAZOLIN SODIUM 1 G/3ML
INJECTION, POWDER, FOR SOLUTION INTRAMUSCULAR; INTRAVENOUS
Status: DISCONTINUED | OUTPATIENT
Start: 2023-07-05 | End: 2023-07-05

## 2023-07-05 RX ORDER — DEXMEDETOMIDINE HYDROCHLORIDE 100 UG/ML
INJECTION, SOLUTION INTRAVENOUS
Status: DISCONTINUED | OUTPATIENT
Start: 2023-07-05 | End: 2023-07-05

## 2023-07-05 RX ORDER — ONDANSETRON 2 MG/ML
INJECTION INTRAMUSCULAR; INTRAVENOUS
Status: DISCONTINUED | OUTPATIENT
Start: 2023-07-05 | End: 2023-07-05

## 2023-07-05 RX ORDER — EPINEPHRINE 1 MG/ML
INJECTION INTRAMUSCULAR; INTRAVENOUS; SUBCUTANEOUS
Status: DISCONTINUED | OUTPATIENT
Start: 2023-07-05 | End: 2023-07-05 | Stop reason: HOSPADM

## 2023-07-05 RX ORDER — MORPHINE SULFATE 2 MG/ML
1 INJECTION, SOLUTION INTRAMUSCULAR; INTRAVENOUS EVERY 10 MIN PRN
Status: ACTIVE | OUTPATIENT
Start: 2023-07-05

## 2023-07-05 RX ORDER — NEOSTIGMINE METHYLSULFATE 0.5 MG/ML
INJECTION, SOLUTION INTRAVENOUS
Status: DISCONTINUED | OUTPATIENT
Start: 2023-07-05 | End: 2023-07-05

## 2023-07-05 RX ORDER — LIDOCAINE HYDROCHLORIDE 10 MG/ML
INJECTION, SOLUTION EPIDURAL; INFILTRATION; INTRACAUDAL; PERINEURAL
Status: DISCONTINUED | OUTPATIENT
Start: 2023-07-05 | End: 2023-07-05 | Stop reason: HOSPADM

## 2023-07-05 RX ORDER — ROCURONIUM BROMIDE 10 MG/ML
INJECTION, SOLUTION INTRAVENOUS
Status: DISCONTINUED | OUTPATIENT
Start: 2023-07-05 | End: 2023-07-05

## 2023-07-05 RX ORDER — LEVALBUTEROL 1.25 MG/.5ML
1.25 SOLUTION, CONCENTRATE RESPIRATORY (INHALATION)
Status: COMPLETED | OUTPATIENT
Start: 2023-07-05 | End: 2023-07-05

## 2023-07-05 RX ORDER — ONDANSETRON 2 MG/ML
4 INJECTION INTRAMUSCULAR; INTRAVENOUS ONCE AS NEEDED
Status: ACTIVE | OUTPATIENT
Start: 2023-07-05 | End: 2034-12-01

## 2023-07-05 RX ORDER — GABAPENTIN 300 MG/1
600 CAPSULE ORAL
Status: COMPLETED | OUTPATIENT
Start: 2023-07-05 | End: 2023-07-05

## 2023-07-05 RX ORDER — ACETAMINOPHEN 500 MG
1000 TABLET ORAL
Status: COMPLETED | OUTPATIENT
Start: 2023-07-05 | End: 2023-07-05

## 2023-07-05 RX ORDER — MUPIROCIN 20 MG/G
OINTMENT TOPICAL
Status: DISPENSED | OUTPATIENT
Start: 2023-07-05

## 2023-07-05 RX ORDER — HYDROCODONE BITARTRATE AND ACETAMINOPHEN 5; 325 MG/1; MG/1
1 TABLET ORAL ONCE AS NEEDED
Status: ACTIVE | OUTPATIENT
Start: 2023-07-05

## 2023-07-05 RX ORDER — SODIUM CHLORIDE 0.9 % (FLUSH) 0.9 %
10 SYRINGE (ML) INJECTION
Status: ACTIVE | OUTPATIENT
Start: 2023-07-05

## 2023-07-05 RX ORDER — BACITRACIN ZINC AND POLYMYXIN B SULFATE 500; 1000 [USP'U]/G; [USP'U]/G
OINTMENT TOPICAL
Status: DISCONTINUED | OUTPATIENT
Start: 2023-07-05 | End: 2023-07-05 | Stop reason: HOSPADM

## 2023-07-05 RX ORDER — HYDROMORPHONE HYDROCHLORIDE 2 MG/ML
INJECTION, SOLUTION INTRAMUSCULAR; INTRAVENOUS; SUBCUTANEOUS
Status: DISCONTINUED | OUTPATIENT
Start: 2023-07-05 | End: 2023-07-05

## 2023-07-05 RX ORDER — LIDOCAINE HYDROCHLORIDE 20 MG/ML
INJECTION, SOLUTION EPIDURAL; INFILTRATION; INTRACAUDAL; PERINEURAL
Status: DISCONTINUED | OUTPATIENT
Start: 2023-07-05 | End: 2023-07-05

## 2023-07-05 RX ORDER — LIDOCAINE HYDROCHLORIDE 10 MG/ML
1 INJECTION, SOLUTION EPIDURAL; INFILTRATION; INTRACAUDAL; PERINEURAL ONCE
Status: ACTIVE | OUTPATIENT
Start: 2023-07-05

## 2023-07-05 RX ORDER — CEFAZOLIN SODIUM 1 G/3ML
2 INJECTION, POWDER, FOR SOLUTION INTRAMUSCULAR; INTRAVENOUS
Status: COMPLETED | OUTPATIENT
Start: 2023-07-05 | End: 2023-07-05

## 2023-07-05 RX ORDER — ALBUTEROL SULFATE 90 UG/1
AEROSOL, METERED RESPIRATORY (INHALATION)
Status: DISCONTINUED | OUTPATIENT
Start: 2023-07-05 | End: 2023-07-05

## 2023-07-05 RX ADMIN — LEVALBUTEROL 1.25 MG: 1.25 SOLUTION, CONCENTRATE RESPIRATORY (INHALATION) at 09:07

## 2023-07-05 RX ADMIN — PROPOFOL 30 MG: 10 INJECTION, EMULSION INTRAVENOUS at 10:07

## 2023-07-05 RX ADMIN — CEFAZOLIN 2 G: 330 INJECTION, POWDER, FOR SOLUTION INTRAMUSCULAR; INTRAVENOUS at 10:07

## 2023-07-05 RX ADMIN — DEXMEDETOMIDINE HYDROCHLORIDE 4 MCG: 100 INJECTION, SOLUTION INTRAVENOUS at 10:07

## 2023-07-05 RX ADMIN — CEFAZOLIN 2 G: 330 INJECTION, POWDER, FOR SOLUTION INTRAMUSCULAR; INTRAVENOUS at 09:07

## 2023-07-05 RX ADMIN — HYDROMORPHONE HYDROCHLORIDE 0.2 MG: 2 INJECTION INTRAMUSCULAR; INTRAVENOUS; SUBCUTANEOUS at 12:07

## 2023-07-05 RX ADMIN — DEXMEDETOMIDINE HYDROCHLORIDE 8 MCG: 100 INJECTION, SOLUTION INTRAVENOUS at 12:07

## 2023-07-05 RX ADMIN — PROPOFOL 30 MG: 10 INJECTION, EMULSION INTRAVENOUS at 12:07

## 2023-07-05 RX ADMIN — GLYCOPYRROLATE 0.6 MG: 0.2 INJECTION, SOLUTION INTRAMUSCULAR; INTRAVENOUS at 12:07

## 2023-07-05 RX ADMIN — MIDAZOLAM HYDROCHLORIDE 2 MG: 1 INJECTION, SOLUTION INTRAMUSCULAR; INTRAVENOUS at 09:07

## 2023-07-05 RX ADMIN — SODIUM CHLORIDE: 0.9 INJECTION, SOLUTION INTRAVENOUS at 11:07

## 2023-07-05 RX ADMIN — FENTANYL CITRATE 100 MCG: 50 INJECTION, SOLUTION INTRAMUSCULAR; INTRAVENOUS at 10:07

## 2023-07-05 RX ADMIN — ONDANSETRON 4 MG: 2 INJECTION INTRAMUSCULAR; INTRAVENOUS at 10:07

## 2023-07-05 RX ADMIN — DEXMEDETOMIDINE HYDROCHLORIDE 12 MCG: 100 INJECTION, SOLUTION INTRAVENOUS at 10:07

## 2023-07-05 RX ADMIN — LIDOCAINE HYDROCHLORIDE 100 MG: 20 INJECTION, SOLUTION EPIDURAL; INFILTRATION; INTRACAUDAL; PERINEURAL at 10:07

## 2023-07-05 RX ADMIN — ACETAMINOPHEN 1000 MG: 500 TABLET ORAL at 07:07

## 2023-07-05 RX ADMIN — ROCURONIUM BROMIDE 50 MG: 10 INJECTION INTRAVENOUS at 10:07

## 2023-07-05 RX ADMIN — HYDROMORPHONE HYDROCHLORIDE 0.2 MG: 2 INJECTION INTRAMUSCULAR; INTRAVENOUS; SUBCUTANEOUS at 11:07

## 2023-07-05 RX ADMIN — GABAPENTIN 600 MG: 300 CAPSULE ORAL at 07:07

## 2023-07-05 RX ADMIN — SODIUM CHLORIDE: 0.9 INJECTION, SOLUTION INTRAVENOUS at 09:07

## 2023-07-05 RX ADMIN — ALBUTEROL SULFATE 4 PUFF: 108 INHALANT RESPIRATORY (INHALATION) at 12:07

## 2023-07-05 RX ADMIN — DEXAMETHASONE SODIUM PHOSPHATE 4 MG: 4 INJECTION, SOLUTION INTRAMUSCULAR; INTRAVENOUS at 10:07

## 2023-07-05 RX ADMIN — HYDROMORPHONE HYDROCHLORIDE 0.4 MG: 2 INJECTION INTRAMUSCULAR; INTRAVENOUS; SUBCUTANEOUS at 12:07

## 2023-07-05 RX ADMIN — PROPOFOL 200 MG: 10 INJECTION, EMULSION INTRAVENOUS at 10:07

## 2023-07-05 RX ADMIN — NEOSTIGMINE METHYLSULFATE 4 MG: 0.5 INJECTION, SOLUTION INTRAVENOUS at 12:07

## 2023-07-05 RX ADMIN — MUPIROCIN: 20 OINTMENT TOPICAL at 07:07

## 2023-07-05 NOTE — ANESTHESIA PROCEDURE NOTES
Intubation    Date/Time: 7/5/2023 10:04 AM  Performed by: Cailin Sharp MD  Authorized by: Cailin Sharp MD     Intubation:     Induction:  Intravenous    Intubated:  Postinduction    Mask Ventilation:  Easy mask    Attempts:  1    Attempted By:  Staff anesthesiologist    Blade:  Polk 3    Laryngeal View Grade: Grade IIA - cords partially seen      Difficult Airway Encountered?: No      Complications:  None    Airway Device:  Oral endotracheal tube    Airway Device Size:  7.0    Style/Cuff Inflation:  Cuffed    Inflation Amount (mL):  3    Tube secured:  21    Placement Verified By:  Capnometry    Complicating Factors:  Overbite    Findings Post-Intubation:  BS equal bilateral and atraumatic/condition of teeth unchanged  Notes:      Receding chin,cricoid pressure, stylet adjustment     Dry lips :Minimal right upper lip bleeding,lubrication applied post intubation  BS:equal   Atraumatic intubation by self x 1

## 2023-07-05 NOTE — OP NOTE
[facility] ([facility])  Plastic Surgery  Operative Note    SUMMARY     Date of Procedure: 7/5/2023     Location:  [facility]    Procedure(s):    Major revision of left reconstructed breast  Fat grafting to left reconstructed breast, 90 cc  Fat grafting to right reconstructed breast, 80 cc  Excision of right lateral chest wall dog ear with layered closure, 10 cm  Excision of right abdominal donor site dog-ear 4 cm  Excision of left abdominal donor site dog ear, 3 cm    Surgeon(s) and Role:     * Kevin Curry, DO - Primary    Assistant: Rafael Butt MD, Fellow    Pre-Operative Diagnosis: Malignant neoplasm of upper-outer quadrant of left breast in female, estrogen receptor negative [C50.412, Z17.1]    Post-Operative Diagnosis: same    Anesthesia: General    Indications for Procedure:  57-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  She underwent immediate JUNAID flap reconstruction and February.  Of note she would had a previous BB are with [facility] pattern.  She healed well.  She would some lipodystrophy of her upper abdominal donor site.  She also had lateral chest wall dog ears due to the significant difference between flap size and her breast size.  She also had small dog ears of the abdominal donor sites and upper pole volume deficiency.  She presented for second-stage autologous breast reconstruction today.    Operative Findings (including complications, if any):   1000 cc tumescent solution used.    Total lipoaspirate 1200 cc.  300 cc of fat collected in the revolve  90 cc of fat of the left breast  80 cc of fat to the right breast  Dog ear excisions listed above    Description of Procedures:  Patient was seen in the preoperative holding area.  Surgical consent was signed her preoperative visit.  All questions were answered.  We marked the upper pole and medial areas of volume deficiency for fat grafting.  Her lateral chest wall was marked for liposuction and dog-ear excision.   Her abdomen was marked for liposuction.  Lower abdominal donor site scars were also marked.  Patient was taken the operating room general anesthesia was induced.  Both breasts and the abdomen were prepped and draped in the sterile fashion.    Began by instilling 1 L of tumescent solution.  Tumescent solution was instilled in the lateral chest wall bilaterally, in the central abdomen, and 2 areas in the flanks.  Afterwards pre tunneling was done using a 4 mm cannula.  Next liposuction was done using a 4 mm liposuction cannula.  In total 300 cc of fat was collected in the revolve.  The patient had some additional lipodystrophy of the abdomen and a 5 mm cannula was used to smooth the rest of the abdominal contour.  The treatment areas for liposuction with the lateral chest wall, upper abdomen, lower abdomen centrally, and flanks.  The Lipo sites were closed with interrupted 5 0 nylon sutures.  The fat was irrigated and processed in the standard fashion with warm lactated Ringer's.  In total 170 cc of fat was able to be harvested and processed used for fat grafting.    Next the lateral chest wall dog ears as well as abdominal dog ears were tailor tacked.  They were marked for excision.  On each side of the abdomen a 15 blade was used for a full-thickness excision of the dog-ear.  They were closed in 2 layers.  The deep dermis was closed with interrupted 3-0 Monocryl and the subcuticular layer was closed with a running 3-0 Monocryl.  The right side measured 4 cm after closure, the left side measured 3 cm after closure.      The right lateral dog ear had also been tailor tacked.  That area was excised full-thickness.  The area of excision was 10 x 3 cm.  It was closed in a linear fashion.  3-0 Monocryl was used for the deep dermis and tack it down to the underlying chest wall to obliterate dead space.  Subcuticular layer was closed with a 3-0 Stratafix.  The length of the wound was 10 cm.      On the left breast lateral  dog ear was excised and closed in the same fashion.  Patient also had a larger skin breast asymmetry.  That was tailor tacked to create a consistent distance from the nipple to the IMF.  The new distance was 7 cm.  The patient also had a lower IMF on that side and IMF was raised 1 cm.  We dissected around the flap skin paddle using a knife to make it a smaller triangle.  The additional skin of the flap was removed.  Also the IMF was incised and dissected straight down to the chest wall and lifted the flap a little bit superiorly.  Next the IMF was sutured to the chest wall 1 cm higher using interrupted 3-0 PDS sutures from the Carolyne's layer to the chest wall.  Next the lateral mastectomy skin flap was elevated to help draped better over the flap.  This was done using the Bovie in the mastectomy plane.  There was an obvious scarred plane there.  Next our incision was temporarily stapled.  There was a consistent nipple to IMF height and better breast contour.  That wound was closed the deep dermis with interrupted 3-0 Monocryl and a running 3-0 Stratafix.      Finally small stab incisions were made using a 14 gauge needle.  Using a 2.1 mm spoon tip cannula fat grafting was done to the upper pole and for medial fullness on both breasts.  In all 90 cc was injected into the left breast and 80 cc was injected into the right breast.  That was all the fat that had been harvested and processed.    The breast chest wall and lateral abdominal incisions were all dressed with Prineo tape.  The Lipo sites were dressed with bacitracin.  The patient was placed in a postoperative bra and a compression garment padded with ABD pads.  She tolerated procedure well taken to recovery in stable condition    Significant Surgical Tasks Conducted by the Assistant(s), if Applicable: The indicated resident served as first assistant for this procedure.    Estimated Blood Loss (EBL):  30 cc           Implants: * No implants in log  *    Specimens:   Specimen (24h ago, onward)      None                    Condition:  Good    Disposition: PACU - hemodynamically stable., discharged home, follow up one-week    Attestation: I was present and scrubbed for the entire procedure.

## 2023-07-05 NOTE — BRIEF OP NOTE
[facility]  Brief Operative Note    Surgery Date: 7/5/2023     Surgeon(s) and Role:     * Kevin Curry, DO - Primary    Assisting Surgeon: None    Pre-op Diagnosis:  Malignant neoplasm of upper-outer quadrant of left breast in female, estrogen receptor negative [C50.412, Z17.1]    Post-op Diagnosis:  Post-Op Diagnosis Codes:     * Malignant neoplasm of upper-outer quadrant of left breast in female, estrogen receptor negative [C50.412, Z17.1]    Procedure(s) (LRB):  BREAST REVISION SURGERY (Bilateral)  LIPOSUCTION, WITH FAT TRANSFER (Bilateral)    Anesthesia: General    Operative Findings: fat grafting and revision    Estimated Blood Loss: * No values recorded between 7/5/2023 10:19 AM and 7/5/2023 12:34 PM *         Specimens:   Specimen (24h ago, onward)      None              Discharge Note    OUTCOME: Patient tolerated treatment/procedure well without complication and is now ready for discharge.    DISPOSITION: Home or Self Care    FINAL DIAGNOSIS:  <principal problem not specified>    FOLLOWUP: In clinic    DISCHARGE INSTRUCTIONS:  No discharge procedures on file.

## 2023-07-05 NOTE — TRANSFER OF CARE
"Anesthesia Transfer of Care Note    Patient: Trinity Morin    Procedure(s) Performed: Procedure(s) (LRB):  BREAST REVISION SURGERY (Bilateral)  LIPOSUCTION, WITH FAT TRANSFER (Bilateral)    Patient location: PACU    Transport from OR: Transported from OR on 6-10 L/min O2 by face mask with adequate spontaneous ventilation    Post pain: adequate analgesia    Post assessment: no apparent anesthetic complications    Post vital signs: stable    Level of consciousness: awake, alert, oriented and sedated    Nausea/Vomiting: no nausea/vomiting    Complications: none    Transfer of care protocol was followed      Last vitals:   Visit Vitals  /76   Pulse 74   Temp 36.5 °C (97.7 °F)   Resp 14   Ht 4' 11" (1.499 m)   Wt 65.8 kg (145 lb)   SpO2 95%   Breastfeeding No   BMI 29.29 kg/m²     "

## 2023-07-05 NOTE — ANESTHESIA POSTPROCEDURE EVALUATION
Anesthesia Post Evaluation    Patient: Trinity Morin    Procedure(s) Performed: Procedure(s) (LRB):  BREAST REVISION SURGERY (Bilateral)  LIPOSUCTION, WITH FAT TRANSFER (Bilateral)    Final Anesthesia Type: general      Patient location during evaluation: PACU  Patient participation: Yes- Able to Participate  Level of consciousness: awake and alert and sedated  Post-procedure vital signs: reviewed and stable  Pain management: adequate  Airway patency: patent    PONV status at discharge: No PONV  Anesthetic complications: no      Cardiovascular status: blood pressure returned to baseline  Respiratory status: unassisted, spontaneous ventilation and room air  Hydration status: euvolemic  Follow-up not needed.          Vitals Value Taken Time   /69 07/05/23 1403   Temp 36.3 °C (97.3 °F) 07/05/23 1245   Pulse 69 07/05/23 1408   Resp 12 07/05/23 1408   SpO2 100 % 07/05/23 1408   Vitals shown include unvalidated device data.      No case tracking events are documented in the log.      Pain/Gregorio Score: Pain Rating Prior to Med Admin: 0 (7/5/2023  7:50 AM)

## 2023-07-05 NOTE — DISCHARGE INSTRUCTIONS
Instructions    Dressing/breast binder (surgi-bra)  A surgical bra may be placed around your chest after your surgery.  If you are given the bra, please wear it as close to 24 hours a day as possible until your post-operative clinic appointment.  If the elastic around the bra irritates your skin, you may wear a soft t-shirt underneath the bra.    You may go without wearing the bra long enough to bath, to launder and dry the bra. If you have fluffy filler placed inside the bra, the filler should be removed whenever the bra is taken off. Please reinsert the fluffy filler, or insert the new soft filler, under the bra when you put the bra back on.  If the bra is uncomfortable, you may wear a supportive sports bra instead after 2 days.    You may shower. Do not take a tub bath and do not soak the surgical site. Please do not remove the glue that covers your incision.  It may flake off around 2-3 weeks after surgery and if not then it will be removed at your clinic visit.      Plastic Surgery Discharge Instructions  Garcia Curry, DO     Wound Care  1. Shower daily beginning 2days after surgery  2. Okay to let warm soapy water run over the wound at that time  3. Do not submerge wounds in the bath  4. Leave any skin glue or mesh tape in place  5. Some bloody drainage is expected. Change pads as needed.     Diet  Regular Diet     Activity  Light activity only - no lifting greater than 10 lbs  Avoid strenuous activity that would cause you to get hot or sweaty     Medications  You have been given a prescription for narcotic pain medication, anti-inflammatory pain, and nerve pain  Please take medications as prescribed     What to call for:  1. Sustained fever > 101.0  2. Changes in color, sensation, temperature or pain at surgical site  3. Redness and/or drainage from the surgical site  4. Any reaction to prescribed medications  5. Continuous bloody drainage from surgical drains  6. Wound vac malfunction  7. Questions related to  the procedure     Follow-up  1. Please call [phone] to schedule or confirm your follow up visit at the [facility] clinic on [facility]  2. Call with any questions or concerns.  2. After hours call [phone] - ask to speak with the Plastic Surgery fellow on call   Additional Instructions    Click to add instructions  Sugammadex Discharge Instructions    Notice:  During your procedure, you were given a drug called Sugammadex to reverse the effects of anesthesia.  As a result, hormonal birth control (such as birth control pills, patches, rings, injections, or IUDs) may not work properly.  Females should use an additional, non-hormonal method of birth control (such as condoms or spermicidal jelly) for 7 days after receiving Sugammadex.

## 2023-07-10 ENCOUNTER — PATIENT MESSAGE (OUTPATIENT)
Dept: PLASTIC SURGERY | Facility: CLINIC | Age: 58
End: 2023-07-10
Payer: OTHER GOVERNMENT

## 2023-07-11 ENCOUNTER — OFFICE VISIT (OUTPATIENT)
Dept: PLASTIC SURGERY | Facility: CLINIC | Age: 58
End: 2023-07-11
Payer: OTHER GOVERNMENT

## 2023-07-11 ENCOUNTER — TELEPHONE (OUTPATIENT)
Dept: PLASTIC SURGERY | Facility: CLINIC | Age: 58
End: 2023-07-11
Payer: OTHER GOVERNMENT

## 2023-07-11 ENCOUNTER — APPOINTMENT (OUTPATIENT)
Dept: LAB | Facility: HOSPITAL | Age: 58
End: 2023-07-11
Attending: SURGERY
Payer: OTHER GOVERNMENT

## 2023-07-11 VITALS — SYSTOLIC BLOOD PRESSURE: 115 MMHG | HEART RATE: 67 BPM | DIASTOLIC BLOOD PRESSURE: 74 MMHG

## 2023-07-11 DIAGNOSIS — C50.412 MALIGNANT NEOPLASM OF UPPER-OUTER QUADRANT OF LEFT BREAST IN FEMALE, ESTROGEN RECEPTOR NEGATIVE: Primary | ICD-10-CM

## 2023-07-11 DIAGNOSIS — Z17.1 MALIGNANT NEOPLASM OF UPPER-OUTER QUADRANT OF LEFT BREAST IN FEMALE, ESTROGEN RECEPTOR NEGATIVE: Primary | ICD-10-CM

## 2023-07-11 DIAGNOSIS — Z09 SURGERY FOLLOW-UP EXAMINATION: Primary | ICD-10-CM

## 2023-07-11 PROCEDURE — 99024 PR POST-OP FOLLOW-UP VISIT: ICD-10-PCS | Mod: ,,, | Performed by: SURGERY

## 2023-07-11 PROCEDURE — 99999 PR PBB SHADOW E&M-EST. PATIENT-LVL III: ICD-10-PCS | Mod: PBBFAC,,, | Performed by: SURGERY

## 2023-07-11 PROCEDURE — 99024 POSTOP FOLLOW-UP VISIT: CPT | Mod: ,,, | Performed by: SURGERY

## 2023-07-11 PROCEDURE — 99999 PR PBB SHADOW E&M-EST. PATIENT-LVL III: CPT | Mod: PBBFAC,,, | Performed by: SURGERY

## 2023-07-11 PROCEDURE — 99213 OFFICE O/P EST LOW 20 MIN: CPT | Mod: PBBFAC | Performed by: SURGERY

## 2023-07-11 NOTE — PROGRESS NOTES
Trinity Morin presents to [facility] for a follow up visit status post revision of bilateral JUNAID flap breast reconstruction on 7/5/23. We performed tailoring of the left skin paddle, excision of dog ears to the right breast incision and bilateral abdominal incision end points, as well as abdominal lipo suction with fat grafting to the breasts. She is happy with the improved contour of her abdomen and left breast. She also reports malodorous urine x 4 days without associated dysuria, urinary frequency or back pain. She would like to have her urine checked. She denies fever, chills, nausea, vomiting or other systemic signs of infection.       ROS - negative, other than stated above    PHYSICAL EXAMINATION  Vitals:    07/11/23 0916   BP: 115/74   Pulse: 67     WD WN NAD  VSS  Normal respiratory effort  R breast - incision CDI, no erythema or drainage, nipple viable, prineo tape on lateral incision  L breast - incision CDI, no erythema or drainage, nipple viable  Abdominal incisions CDI, prineo tape in place  Lipo sites with sutures       ASSESSMENT/PLAN  57 y.o. F s/p left breast revision, lipo with fat grafting and dog ear excision  - Doing well, no issues.   - Lipo site sutures removed  - Sent UA at patient's request  - RTC x 2 week(s), appointment scheduled.      All questions were answered. The patient was advised to contact the clinic with any questions or concerns prior to their next visit.       Bee Hernandez PA-C  Plastic and Reconstructive Surgery

## 2023-07-12 RX ORDER — CEPHALEXIN 500 MG/1
500 CAPSULE ORAL EVERY 12 HOURS
Qty: 14 CAPSULE | Refills: 0 | Status: SHIPPED | OUTPATIENT
Start: 2023-07-12 | End: 2023-07-19

## 2023-07-24 ENCOUNTER — PATIENT MESSAGE (OUTPATIENT)
Dept: PLASTIC SURGERY | Facility: CLINIC | Age: 58
End: 2023-07-24
Payer: OTHER GOVERNMENT

## 2023-07-25 ENCOUNTER — OFFICE VISIT (OUTPATIENT)
Dept: PLASTIC SURGERY | Facility: CLINIC | Age: 58
End: 2023-07-25
Payer: OTHER GOVERNMENT

## 2023-07-25 VITALS
BODY MASS INDEX: 29.24 KG/M2 | WEIGHT: 145.06 LBS | SYSTOLIC BLOOD PRESSURE: 120 MMHG | HEIGHT: 59 IN | HEART RATE: 60 BPM | DIASTOLIC BLOOD PRESSURE: 80 MMHG

## 2023-07-25 DIAGNOSIS — C50.412 MALIGNANT NEOPLASM OF UPPER-OUTER QUADRANT OF LEFT BREAST IN FEMALE, ESTROGEN RECEPTOR NEGATIVE: ICD-10-CM

## 2023-07-25 DIAGNOSIS — Z17.1 MALIGNANT NEOPLASM OF UPPER-OUTER QUADRANT OF LEFT BREAST IN FEMALE, ESTROGEN RECEPTOR NEGATIVE: ICD-10-CM

## 2023-07-25 DIAGNOSIS — Z15.01 BRCA GENE MUTATION POSITIVE: Primary | ICD-10-CM

## 2023-07-25 DIAGNOSIS — Z15.09 BRCA GENE MUTATION POSITIVE: Primary | ICD-10-CM

## 2023-07-25 PROCEDURE — 99024 POSTOP FOLLOW-UP VISIT: CPT | Mod: ,,, | Performed by: SURGERY

## 2023-07-25 PROCEDURE — 99999 PR PBB SHADOW E&M-EST. PATIENT-LVL III: CPT | Mod: PBBFAC,,, | Performed by: SURGERY

## 2023-07-25 PROCEDURE — 99999 PR PBB SHADOW E&M-EST. PATIENT-LVL III: ICD-10-PCS | Mod: PBBFAC,,, | Performed by: SURGERY

## 2023-07-25 PROCEDURE — 99213 OFFICE O/P EST LOW 20 MIN: CPT | Mod: PBBFAC | Performed by: SURGERY

## 2023-07-25 PROCEDURE — 99024 PR POST-OP FOLLOW-UP VISIT: ICD-10-PCS | Mod: ,,, | Performed by: SURGERY

## 2023-07-25 NOTE — PROGRESS NOTES
Trinity Morin presents to [facility] for a follow up visit status post revision of bilateral JUNAID flap breast reconstruction on 7/5/23. She is doing well today with no issues since her last visit. She is happy with the contour of her reconstructed breasts. She is also pleased with the improvement in her abdominal contour following liposuction. She was treated for a UTI at last visit and notes resolution of her symptoms. She is interested in excising a lesion of the right chest wall inferior to her reconstructed breast. She denies fever, chills, nausea, vomiting or other systemic signs of infection.       ROS - negative, other than stated above    PHYSICAL EXAMINATION  Vitals:    07/25/23 0927   BP: 120/80   Pulse: 60     WD WN NAD  VSS  Normal respiratory effort  R breast - incision CDI, no erythema or drainage, nipple viable, 0.5cm raised hyperpigmented lesion of chest wall inferior to breast  L breast - incision CDI, no erythema or drainage, nipple viable  Abdominal incisions well healed, no issues. Flat abdominal contour.      ASSESSMENT/PLAN  57 y.o. F s/p bilateral JUNAID flap revision  - Doing well, no issues   - Will book excision of right chest wall lesion in minor room  - Follow up in 3 months, appointment scheduled.      All questions were answered. The patient was advised to contact the clinic with any questions or concerns prior to their next visit.       Bee Hernandez PA-C  Plastic and Reconstructive Surgery

## 2023-08-14 ENCOUNTER — PATIENT MESSAGE (OUTPATIENT)
Dept: PLASTIC SURGERY | Facility: CLINIC | Age: 58
End: 2023-08-14
Payer: OTHER GOVERNMENT

## 2023-08-28 ENCOUNTER — PATIENT MESSAGE (OUTPATIENT)
Dept: SURGERY | Facility: CLINIC | Age: 58
End: 2023-08-28
Payer: OTHER GOVERNMENT

## 2023-09-06 NOTE — PROGRESS NOTES
[facility]           Breast Surgery Surveillance    9/7/2023    DIAGNOSIS:   This is a 58 y.o. female with a history of stage pT1b N0 Mx grade 3 ER - NC - HER2 - invasive ductal carcinoma of the left breast.    TREATMENT:   1. Bilateral mastectomy with left sentinel node biopsy on 2/8/2023. Dr. Kim Jiménez M.D. Surgical Oncology   Pathology showed left breast IDC, 0/2 nodes with metastatic carcinoma; right breast with no atypia or malignancy  2. Chemotherapy - patient declined adjuvant chemotherapy with Dr. Reji M.D. Medical Oncology     HISTORY OF PRESENT ILLNESS:   Trinity Morin is a 58 y.o. female comes in for oncological follow up.  She denies change in her breast self-exam specifically denying new masses, skin or nipple changes, or nipple discharge. Past medical and surgical history is updated with no new changes. There have been no changes to family history. The patient denies constitutional symptoms of night sweats, weight loss, new headaches, visual changes, new back or bony pain, chest pain, or shortness of breath.      Review of Systems: See HPI/Interval History for other systems reviewed.     IMAGING:   None     MEDICATIONS/ALLERGIES:     Current Outpatient Medications   Medication Instructions    albuterol (PROVENTIL/VENTOLIN HFA) 90 mcg/actuation inhaler INHALE 1 PUFF BY MOUTH FOUR TIMES A DAY AS NEEDED    ALBUTEROL INHL Inhalation, 4 times daily PRN    ascorbic acid (vitamin C) (VITAMIN C) 500 mg    cyanocobalamin (vitamin B-12) 50 mcg, Oral, Daily    gabapentin (NEURONTIN) 300 mg, Oral, 3 times daily    hydrocortisone 2.5 % cream Topical (Top), 2 times daily    hydrOXYzine (ATARAX) 50 mg    LIDOcaine-prilocaine (EMLA) cream Topical (Top), As needed (PRN)    methocarbamoL (ROBAXIN) 500 MG Tab TAKE 1 TO 2 TABLETS BY MOUTH THREE TIMES A DAY AS NEEDED FOR MUSCLE SPASMS    oxyCODONE (ROXICODONE) 5 mg, Oral, Every 4 hours PRN    vitamin D (VITAMIN D3) 1,000 Units, Oral, Daily     Review of  "patient's allergies indicates:   Allergen Reactions    Nitrofurantoin monohyd/m-cryst Hives    Sulfamethoxazole-trimethoprim Anaphylaxis, Itching, Rash, Swelling and Hives    Dextromethorphan      [facility]    Macrodantin [nitrofurantoin macrocrystalline]     Rifampin      [facility]    Sulfa (sulfonamide antibiotics)        PHYSICAL EXAM:   /62 (BP Location: Right arm, Patient Position: Sitting, BP Method: Medium (Automatic))   Pulse 68   Ht 4' 11" (1.499 m)   Wt 64.9 kg (143 lb)   SpO2 (!) 93%   BMI 28.88 kg/m²     General: The patient appears well and is in no acute distress.   Neuro: Alert & oriented x3. HEENT: PERRLA, EOMI, sclerae nonicteric. Mucous membranes moist.   Cardiovascular: RRR, no g/r/m.  Breasts: The exam was done with the patient seated and supine. Left breast - within normal limits. No palpable masses and no abnormal skin or nipple findings. No supraclavicular or axillary lymphadenopathy on the left side.   Right breast - within normal limits. No palpable masses and no abnormal skin or nipple findings. No supraclavicular or axillary lymphadenopathy on the right side.  Pulmonary: clear to auscultation bilaterally   Abdomen: soft, nontender, nondistended. No masses.    Extremities: No clubbing or cyanosis. Bilateral full arm range of motion    ASSESSMENT:   This is a 58 y.o. female without evidence of recurrence by exam, history or imaging.       PLAN:   1. Continue monthly self breast exams and call the clinic with any changes or problems.  2. No mammogram indicated given bilateral mastectomies  3. Return to clinic in 1 year, sooner if there are any concerns on self exam.     The patient is in agreement with the plan. Questions were encouraged and answered to patient's satisfaction. Richardcarl will call our office with any questions or concerns.      25 minutes were spent on this encounter, 15 of which was face to face counseling and 10 minutes were spent on chart review and " coordination of care.

## 2023-09-07 ENCOUNTER — OFFICE VISIT (OUTPATIENT)
Dept: SURGERY | Facility: CLINIC | Age: 58
End: 2023-09-07
Payer: OTHER GOVERNMENT

## 2023-09-07 VITALS
OXYGEN SATURATION: 93 % | DIASTOLIC BLOOD PRESSURE: 62 MMHG | WEIGHT: 143 LBS | SYSTOLIC BLOOD PRESSURE: 135 MMHG | HEIGHT: 59 IN | BODY MASS INDEX: 28.83 KG/M2 | HEART RATE: 68 BPM

## 2023-09-07 DIAGNOSIS — C50.412 MALIGNANT NEOPLASM OF UPPER-OUTER QUADRANT OF LEFT BREAST IN FEMALE, ESTROGEN RECEPTOR NEGATIVE: Primary | ICD-10-CM

## 2023-09-07 DIAGNOSIS — Z17.1 MALIGNANT NEOPLASM OF UPPER-OUTER QUADRANT OF LEFT BREAST IN FEMALE, ESTROGEN RECEPTOR NEGATIVE: Primary | ICD-10-CM

## 2023-09-07 PROCEDURE — 99213 OFFICE O/P EST LOW 20 MIN: CPT | Mod: S$PBB,,, | Performed by: SURGERY

## 2023-09-07 PROCEDURE — 99213 PR OFFICE/OUTPT VISIT, EST, LEVL III, 20-29 MIN: ICD-10-PCS | Mod: S$PBB,,, | Performed by: SURGERY

## 2023-09-07 PROCEDURE — 99213 OFFICE O/P EST LOW 20 MIN: CPT | Mod: PBBFAC | Performed by: SURGERY

## 2023-09-07 PROCEDURE — 99999 PR PBB SHADOW E&M-EST. PATIENT-LVL III: CPT | Mod: PBBFAC,,, | Performed by: SURGERY

## 2023-09-07 PROCEDURE — 99999 PR PBB SHADOW E&M-EST. PATIENT-LVL III: ICD-10-PCS | Mod: PBBFAC,,, | Performed by: SURGERY

## 2023-09-11 ENCOUNTER — PATIENT MESSAGE (OUTPATIENT)
Dept: PLASTIC SURGERY | Facility: CLINIC | Age: 58
End: 2023-09-11
Payer: OTHER GOVERNMENT

## 2023-09-13 ENCOUNTER — PATIENT MESSAGE (OUTPATIENT)
Dept: PLASTIC SURGERY | Facility: CLINIC | Age: 58
End: 2023-09-13
Payer: OTHER GOVERNMENT

## 2023-09-25 ENCOUNTER — PATIENT MESSAGE (OUTPATIENT)
Dept: GYNECOLOGIC ONCOLOGY | Facility: CLINIC | Age: 58
End: 2023-09-25
Payer: OTHER GOVERNMENT

## 2023-10-06 ENCOUNTER — TELEPHONE (OUTPATIENT)
Dept: PLASTIC SURGERY | Facility: CLINIC | Age: 58
End: 2023-10-06
Payer: OTHER GOVERNMENT

## 2023-10-06 ENCOUNTER — PATIENT MESSAGE (OUTPATIENT)
Dept: PLASTIC SURGERY | Facility: CLINIC | Age: 58
End: 2023-10-06
Payer: OTHER GOVERNMENT

## 2023-10-06 NOTE — TELEPHONE ENCOUNTER
Spoke to pt and offered a minor procedure  date 10/12 at 0800 the [facility] clinic  2nd floor at  - Pt agreeable. Provided patient with details of post op appt, basic prep for procedure , arrival time the day before,  and address of the location.  call back # to RN navigator given should any questions or concerns arise  All questions and concerns addressed. Pt voiced understanding.

## 2023-10-11 ENCOUNTER — TELEPHONE (OUTPATIENT)
Dept: OPHTHALMOLOGY | Facility: CLINIC | Age: 58
End: 2023-10-11
Payer: OTHER GOVERNMENT

## 2023-10-11 ENCOUNTER — TELEPHONE (OUTPATIENT)
Dept: PLASTIC SURGERY | Facility: CLINIC | Age: 58
End: 2023-10-11
Payer: OTHER GOVERNMENT

## 2023-10-11 NOTE — TELEPHONE ENCOUNTER
----- Message from Lidia Nevarez MA sent at 10/11/2023  1:12 PM CDT -----    ----- Message -----  From: Bhumi Curry  Sent: 10/11/2023  12:52 PM CDT  To: [facility] Clinical Support Staff    Good Afternoon,     The [facility] would like to refer the following patient to Ophthalmology department. The patients diagnosis is  Other benign neoplasm of skin of other parts of face . I have scanned the patients referral and records into . Please schedule     Thank you,    Bhumi Ritchie

## 2023-10-11 NOTE — TELEPHONE ENCOUNTER
No available appt times at this time. Explained in VM that we are booked 6-8months out and currently adding pts to wait list. Instructed to call back to confirm if she wants to be added to wait list.

## 2023-10-11 NOTE — TELEPHONE ENCOUNTER
Called placed to pt - confirmed cancellation of tomorrows procedure - Pt wants to r/s due to awaiting [facility]

## 2023-10-17 ENCOUNTER — TELEPHONE (OUTPATIENT)
Dept: SPEECH THERAPY | Facility: HOSPITAL | Age: 58
End: 2023-10-17
Payer: OTHER GOVERNMENT

## 2023-10-17 NOTE — TELEPHONE ENCOUNTER
Left msg for pt as well as mychart informing that referral had  for voice therapy. Will need [facility] extension [facility] the [facility].

## 2023-10-25 ENCOUNTER — TELEPHONE (OUTPATIENT)
Dept: SPEECH THERAPY | Facility: HOSPITAL | Age: 58
End: 2023-10-25
Payer: OTHER GOVERNMENT

## 2023-10-25 NOTE — TELEPHONE ENCOUNTER
Chele pt to schedule appt after receiving extension [facility]. Pt scheduled tomorrow for 11am, virtual appt. Pt presently in [place].

## 2023-10-26 ENCOUNTER — CLINICAL SUPPORT (OUTPATIENT)
Dept: SPEECH THERAPY | Facility: HOSPITAL | Age: 58
End: 2023-10-26
Attending: OTOLARYNGOLOGY
Payer: OTHER GOVERNMENT

## 2023-10-26 DIAGNOSIS — J38.2 VOCAL FOLD NODULES: ICD-10-CM

## 2023-10-26 DIAGNOSIS — R49.0 DYSPHONIA: ICD-10-CM

## 2023-10-26 PROCEDURE — 92524 BEHAVRAL QUALIT ANALYS VOICE: CPT | Mod: 95,GN | Performed by: SPEECH-LANGUAGE PATHOLOGIST

## 2023-10-26 NOTE — PROGRESS NOTES
Referring provider: Dr. Brice Vazquez  Reason for visit:  Behavioral and qualitative analysis of voice and resonance (CPT 26431)    The patient location is: [place]  The chief complaint leading to consultation is: voice  Visit type: audiovisual  Face to Face time with patient: 30  35 minutes of total time spent on the encounter, which includes face to face time and non-face to face time preparing to see the patient (eg, review of tests), Obtaining and/or reviewing separately obtained history, Documenting clinical information in the electronic or other health record, Independently interpreting results (not separately reported) and communicating results to the patient/family/caregiver, or Care coordination (not separately reported).     Subjective / History    Trinity Morin is a 58 y.o. female referred for voice evaluation (CPT 48033) by Dr. Vazquez.  She presents with complaints of hoarseness, difficulty singing which began a few years ago.  The patient also endorses: voice worse in morning, fatigue with use, changes in modal pitch, and difficulty with singing.   She sings and doesn't feel like she has been able to sing well for a while.   Patient does not work outside the home at this time.  She describes herself as talkative.      Pt was evaluated for this problem May.  She reports she never did any of her exercises.  She is not working outside the home.  Notes her voice is bad in daily conversations and gets worse with talking.      Swallowing: no c/o   Breathing:  sleep apnea     Smokin packs/day   Caffeine: minimal  Reflux:  possible  Water: 2-3 glasses, but inconsistent    Stroboscopy findings (per Dr. Vazquez on 3/6/23)  - left vocal fold with sessile convexity along free edge, middle third  - smaller convexity along free edge of right vocal fold, middle third  - premature contact, hourglass closure, pliability intact  - mild insufficiency with A/P gap in uppermost register     Past Medical History:    Diagnosis Date    Asthma     Bronchitis     General anesthetics causing adverse effect in therapeutic use     slow to wake up    GERD (gastroesophageal reflux disease)     Sleep apnea     wears mouth device     Current Outpatient Medications on File Prior to Visit   Medication Sig Dispense Refill    albuterol (PROVENTIL/VENTOLIN HFA) 90 mcg/actuation inhaler INHALE 1 PUFF BY MOUTH FOUR TIMES A DAY AS NEEDED      ALBUTEROL INHL Inhale into the lungs 4 (four) times daily as needed.      ascorbic acid, vitamin C, (VITAMIN C) 500 MG tablet 500 mg.      cyanocobalamin, vitamin B-12, 50 mcg tablet Take 50 mcg by mouth once daily.      gabapentin (NEURONTIN) 100 MG capsule Take 3 capsules (300 mg total) by mouth 3 (three) times daily. for 7 days 63 capsule 0    hydrocortisone 2.5 % cream Apply topically 2 (two) times daily. (Patient not taking: Reported on 9/7/2023) 20 g 0    hydrOXYzine (ATARAX) 50 MG tablet 50 mg.      LIDOcaine-prilocaine (EMLA) cream Apply topically as needed. 30 g 3    methocarbamoL (ROBAXIN) 500 MG Tab TAKE 1 TO 2 TABLETS BY MOUTH THREE TIMES A DAY AS NEEDED FOR MUSCLE SPASMS      oxyCODONE (ROXICODONE) 5 MG immediate release tablet Take 1 tablet (5 mg total) by mouth every 4 (four) hours as needed for Pain. 10 tablet 0    vitamin D (VITAMIN D3) 1000 units Tab Take 1,000 Units by mouth once daily.       Current Facility-Administered Medications on File Prior to Visit   Medication Dose Route Frequency Provider Last Rate Last Admin    HYDROcodone-acetaminophen 5-325 mg per tablet 1 tablet  1 tablet Oral Once PRN Cailin Sharp MD        LIDOcaine (PF) 10 mg/ml (1%) injection 10 mg  1 mL Intradermal Once Bee Hernandez PA-C        LIDOcaine (PF) 10 mg/ml (1%) injection 10 mg  1 mL Intradermal Once Bee Hernandez PA-C        morphine injection 1 mg  1 mg Intravenous Q10 Min PRN Cailin Sharp MD        mupirocin 2 % ointment   Nasal On Call Procedure Bee Hernandez PA-C   Given at 02/08/23  0617    mupirocin 2 % ointment   Nasal On Call Procedure Bee Hernandez PA-C   Given at 07/05/23 0750    ondansetron injection 4 mg  4 mg Intravenous Once PRN Cailin Sharp MD        sodium chloride 0.9% flush 10 mL  10 mL Intravenous PRN Bee Hernandez PA-C        sodium chloride 0.9% flush 10 mL  10 mL Intravenous PRN Bee Hernandez PA-C           Objective    Perceptual/behavioral assessment  -CAPE-V Overall Score: 22  -Quality: rough  -Volume: appropriate for age and gender  -Pitch: low F0  -Flexibility: diminished  -Habitual respiratory pattern: chest/clavicular    Education / Stimulability Trials   Discussed importance of vocal hygiene including: hydration and conservation. Patient was stimulable for improved voice using SOVT exercises.  She was instructed on SOVT straw phonation and was able to improve voice into connected speech as well beyond isolated exercises.  Encouraged practicing exercises several times daily in isolation and into short phrases to solidify muscle memory patterns and reduce extralaryngeal tension during speech tasks.  She was amenable to all suggestions.     Functional goals  Length Status Goal   Long term Initiated Patient will implement and adhere to vocal hygiene protocols on a daily basis, including the elimination of phonotraumatic behaviors.    Long term Initiated Patient and clinician will facilitate changes in vocal function in order to restore functional use of voice for daily occupational, social, and emotional demands.    Long term Initiated Patient will improve coordination of respiration and phonation for efficient vocal production at a conversational level.    Short term Initiated Patient will coordinate vocal subsystems in hierarchical speech tasks by producing sound in an efficient manner yielding improved or normal voice quality and vocal endurance in the presence of existing laryngeal disorder with 90% accuracy independently.   Short term Initiated Patient  will reduce vocal effort and fatigue by decreasing upper body tension as evidenced by a decrease in symptoms and lack of observable/palpable signs of hyperkinetic muscular behaviors.   Short term Initiated Patient will complete SOVT exercises and/or resonant-focused exercises 3-5x daily to strengthen and balance the intrinsic laryngeal musculature and maximize glottic closure without medial hyperfunction.   Short term Initiated Patient will improve the quality, efficiency, and ease of phonation through resonant and/or airflow exercises from the syllable to conversation level with 80% accuracy.   Short term Initiated Patient will increase awareness for voice conservations behaviors by following the 50/10 rule and reduce voice use in competing noise environments.    Short term Initiated Patient will demonstrate the ability to increase awareness of voicing behavior through self-monitoring to facilitate generalization in functional speaking situations with 80% accuracy.        Assessment     Trinity Morin presents with moderate dysphonia.  Diagnoses of Dysphonia and Vocal fold nodules were pertinent to this visit.  Prognosis for continued improvement is good/fair.    Recommendations / POC    Recommend 2-4 sessions of voice/speech therapy over 4-6 weeks with a speech-language pathologist to optimize glottal postures for improved vocal function, vocal efficiency, and ease of phonation.  She should continue the exercises as discussed in session and should contact me with any further questions.

## 2023-10-31 ENCOUNTER — OFFICE VISIT (OUTPATIENT)
Dept: PLASTIC SURGERY | Facility: CLINIC | Age: 58
End: 2023-10-31
Payer: OTHER GOVERNMENT

## 2023-10-31 ENCOUNTER — TELEPHONE (OUTPATIENT)
Dept: GYNECOLOGIC ONCOLOGY | Facility: CLINIC | Age: 58
End: 2023-10-31
Payer: OTHER GOVERNMENT

## 2023-10-31 VITALS — HEART RATE: 92 BPM | SYSTOLIC BLOOD PRESSURE: 134 MMHG | DIASTOLIC BLOOD PRESSURE: 78 MMHG

## 2023-10-31 DIAGNOSIS — Z15.09 BRCA GENE MUTATION POSITIVE: Primary | ICD-10-CM

## 2023-10-31 DIAGNOSIS — C50.412 MALIGNANT NEOPLASM OF UPPER-OUTER QUADRANT OF LEFT BREAST IN FEMALE, ESTROGEN RECEPTOR NEGATIVE: ICD-10-CM

## 2023-10-31 DIAGNOSIS — Z15.01 BRCA GENE MUTATION POSITIVE: Primary | ICD-10-CM

## 2023-10-31 DIAGNOSIS — Z17.1 MALIGNANT NEOPLASM OF UPPER-OUTER QUADRANT OF LEFT BREAST IN FEMALE, ESTROGEN RECEPTOR NEGATIVE: ICD-10-CM

## 2023-10-31 PROCEDURE — 99213 OFFICE O/P EST LOW 20 MIN: CPT | Mod: PBBFAC | Performed by: SURGERY

## 2023-10-31 PROCEDURE — 99213 OFFICE O/P EST LOW 20 MIN: CPT | Mod: S$PBB,,, | Performed by: SURGERY

## 2023-10-31 PROCEDURE — 99213 PR OFFICE/OUTPT VISIT, EST, LEVL III, 20-29 MIN: ICD-10-PCS | Mod: S$PBB,,, | Performed by: SURGERY

## 2023-10-31 PROCEDURE — 99999 PR PBB SHADOW E&M-EST. PATIENT-LVL III: ICD-10-PCS | Mod: PBBFAC,,, | Performed by: SURGERY

## 2023-10-31 PROCEDURE — 99999 PR PBB SHADOW E&M-EST. PATIENT-LVL III: CPT | Mod: PBBFAC,,, | Performed by: SURGERY

## 2023-10-31 NOTE — TELEPHONE ENCOUNTER
----- Message from Brenda Guzman sent at 10/31/2023 10:44 AM CDT -----      Name of Who is Calling: LUPILLO CASTILLO [[id number]]      What is the request in detail: Pt called to speak with the office regarding her chart.Please contact to further discuss and advise.          Can the clinic reply by [facility]: Y      What Number to Call Back if not in [facility]: [phone]

## 2023-11-01 NOTE — PROGRESS NOTES
Trinity Morin presents to Plastic Surgery Clinic for a follow up visit status post second stage revision of her JUNAID flap reconstruction on 7/5/23. She continues to complain about her abdominal contour and feeling full in the upper abdomen. She has worried since developing a seroma following her JNUAID flap that any changes in abdominal contour are related to that. This time she feels more of a bulge in the upper midline that is worse with position changes. She is also awaiting a minor procedure for removal of a nevus on the chest wall near her breast.     ROS - negative, other than stated above    PHYSICAL EXAMINATION  Bilateral JUNAID flaps with excellent contour and well healed incisions, nipples viable  Abdominal contour is rounded. She has a 4cm diastasis recti.  No hernia.  Abdominal incision and umbilicus well healed.    ASSESSMENT/PLAN  58 y.o. F s/p second stage JUNAID flap revision  - Doing well, no issues.   - Discussed diastasis recti as source of abdominal wall concerns. Reassured there are no clinical signs of a seroma or hernia today. She is cleared to exercise without restrictions. She's not interested in diastasis repair at this time.  - F/u 4 months      All questions were answered. The patient was advised to contact the clinic with any questions or concerns prior to their next visit.       Bee Hernandez PA-C  Plastic and Reconstructive Surgery

## 2023-12-15 ENCOUNTER — TELEPHONE (OUTPATIENT)
Dept: GYNECOLOGIC ONCOLOGY | Facility: CLINIC | Age: 58
End: 2023-12-15
Payer: OTHER GOVERNMENT

## 2023-12-15 NOTE — TELEPHONE ENCOUNTER
Spoke with the patient about her reschedule appointment. ----- Message from Tian Berry RN sent at 12/15/2023 10:44 AM CST -----  Regarding: FW: pt call/sooner appt    ----- Message -----  From: Fatemeh Kinney  Sent: 12/15/2023  10:41 AM CST  To: [facility] Staff  Subject: pt call/sooner appt                              Name of Who is Calling: pt        What is the request in detail: would like a call back , is requesting a sooner appt, please advise.        Can the clinic reply by [facility]: no          What Number to Call Back if not in [facility]: [phone]

## 2023-12-18 ENCOUNTER — TELEPHONE (OUTPATIENT)
Dept: GYNECOLOGIC ONCOLOGY | Facility: CLINIC | Age: 58
End: 2023-12-18
Payer: OTHER GOVERNMENT

## 2023-12-22 ENCOUNTER — TELEPHONE (OUTPATIENT)
Dept: GYNECOLOGIC ONCOLOGY | Facility: CLINIC | Age: 58
End: 2023-12-22

## 2023-12-22 ENCOUNTER — OFFICE VISIT (OUTPATIENT)
Dept: GYNECOLOGIC ONCOLOGY | Facility: CLINIC | Age: 58
End: 2023-12-22
Payer: OTHER GOVERNMENT

## 2023-12-22 VITALS
HEART RATE: 77 BPM | SYSTOLIC BLOOD PRESSURE: 120 MMHG | WEIGHT: 142.31 LBS | DIASTOLIC BLOOD PRESSURE: 70 MMHG | BODY MASS INDEX: 28.69 KG/M2 | HEIGHT: 59 IN

## 2023-12-22 DIAGNOSIS — Z15.09 BRCA2 GENE MUTATION POSITIVE IN FEMALE: Primary | ICD-10-CM

## 2023-12-22 DIAGNOSIS — Z15.01 BRCA2 GENE MUTATION POSITIVE IN FEMALE: Primary | ICD-10-CM

## 2023-12-22 DIAGNOSIS — Z15.02 BRCA2 GENE MUTATION POSITIVE IN FEMALE: Primary | ICD-10-CM

## 2023-12-22 PROCEDURE — 99999 PR PBB SHADOW E&M-EST. PATIENT-LVL III: CPT | Mod: PBBFAC,,, | Performed by: OBSTETRICS & GYNECOLOGY

## 2023-12-22 PROCEDURE — 99214 OFFICE O/P EST MOD 30 MIN: CPT | Mod: S$PBB,,, | Performed by: OBSTETRICS & GYNECOLOGY

## 2023-12-22 PROCEDURE — 99214 PR OFFICE/OUTPT VISIT, EST, LEVL IV, 30-39 MIN: ICD-10-PCS | Mod: S$PBB,,, | Performed by: OBSTETRICS & GYNECOLOGY

## 2023-12-22 PROCEDURE — 99999 PR PBB SHADOW E&M-EST. PATIENT-LVL III: ICD-10-PCS | Mod: PBBFAC,,, | Performed by: OBSTETRICS & GYNECOLOGY

## 2023-12-22 PROCEDURE — 99213 OFFICE O/P EST LOW 20 MIN: CPT | Mod: PBBFAC | Performed by: OBSTETRICS & GYNECOLOGY

## 2023-12-22 NOTE — PROGRESS NOTES
"Subjective:       Patient ID: Trinity Morin is a 58 y.o. female.    Chief Complaint: Follow-up (6 month )    Here today for continued f/u.  Not seen since last year.  Has undergone mastectomy and JUNAID reconstruction.  Not currently on therapy.  [facility]  Review of Systems   Constitutional:  Negative for chills and fever.   HENT:  Negative for mouth sores.    Respiratory:  Negative for chest tightness and shortness of breath.    Cardiovascular:  Negative for chest pain.   Gastrointestinal:  Negative for abdominal distention, nausea and vomiting.   Genitourinary:  Negative for dysuria, hematuria, pelvic pain, vaginal bleeding and vaginal discharge.   Neurological:  Negative for syncope and weakness.         Objective:    /70   Pulse 77   Ht 4' 11" (1.499 m)   Wt 64.5 kg (142 lb 4.8 oz)   BMI 28.74 kg/m²     Physical Exam  Vitals reviewed. Exam conducted with a chaperone present.   Constitutional:       Appearance: Normal appearance.   HENT:      Head: Normocephalic and atraumatic.   Eyes:      Extraocular Movements: Extraocular movements intact.      Conjunctiva/sclera: Conjunctivae normal.      Pupils: Pupils are equal, round, and reactive to light.   Pulmonary:      Effort: Pulmonary effort is normal. No respiratory distress.   Abdominal:      General: There is no distension.      Palpations: Abdomen is soft.      Tenderness: There is no abdominal tenderness.      Hernia: There is no hernia in the left inguinal area or right inguinal area.   Genitourinary:     Labia:         Right: No rash.         Left: No rash.       Vagina: Normal.      Cervix: No cervical motion tenderness or discharge.      Uterus: Normal. Not deviated, not enlarged and not fixed.       Adnexa: Right adnexa normal and left adnexa normal.        Right: No mass.          Left: No mass.        Rectum: Normal.   Musculoskeletal:         General: Normal range of motion.   Skin:     General: Skin is warm and dry. "   Neurological:      General: No focal deficit present.      Mental Status: She is alert and oriented to person, place, and time. Mental status is at baseline.   Psychiatric:         Mood and Affect: Mood normal.         Behavior: Behavior normal.         Thought Content: Thought content normal.         Judgment: Judgment normal.       Assessment:       Problem List Items Addressed This Visit          Oncology    BRCA2 gene mutation positive in female - Primary       Plan:       Patient has recovered well from mastectomy with reconstruction.  Now open to considering RRSO.  Wants end of January potentially.

## 2023-12-27 ENCOUNTER — PATIENT MESSAGE (OUTPATIENT)
Dept: GYNECOLOGIC ONCOLOGY | Facility: CLINIC | Age: 58
End: 2023-12-27
Payer: OTHER GOVERNMENT

## 2024-01-10 ENCOUNTER — DOCUMENTATION ONLY (OUTPATIENT)
Dept: GYNECOLOGIC ONCOLOGY | Facility: CLINIC | Age: 59
End: 2024-01-10
Payer: OTHER GOVERNMENT

## 2024-03-04 ENCOUNTER — PATIENT MESSAGE (OUTPATIENT)
Dept: SURGERY | Facility: CLINIC | Age: 59
End: 2024-03-04
Payer: OTHER GOVERNMENT

## 2024-03-15 ENCOUNTER — OFFICE VISIT (OUTPATIENT)
Dept: SURGERY | Facility: CLINIC | Age: 59
End: 2024-03-15
Payer: OTHER GOVERNMENT

## 2024-03-15 VITALS
WEIGHT: 142 LBS | SYSTOLIC BLOOD PRESSURE: 125 MMHG | HEIGHT: 59 IN | BODY MASS INDEX: 28.63 KG/M2 | DIASTOLIC BLOOD PRESSURE: 82 MMHG | HEART RATE: 60 BPM

## 2024-03-15 DIAGNOSIS — Z12.39 ENCOUNTER FOR SCREENING BREAST EXAMINATION: ICD-10-CM

## 2024-03-15 DIAGNOSIS — Z85.3 PERSONAL HISTORY OF BREAST CANCER: Primary | ICD-10-CM

## 2024-03-15 PROCEDURE — 99203 OFFICE O/P NEW LOW 30 MIN: CPT | Mod: S$PBB,,, | Performed by: NURSE PRACTITIONER

## 2024-03-15 PROCEDURE — 99999 PR PBB SHADOW E&M-EST. PATIENT-LVL III: CPT | Mod: PBBFAC,,, | Performed by: NURSE PRACTITIONER

## 2024-03-15 PROCEDURE — 99213 OFFICE O/P EST LOW 20 MIN: CPT | Mod: PBBFAC | Performed by: NURSE PRACTITIONER

## 2024-03-15 NOTE — PROGRESS NOTES
[facility]           Breast Surgery Surveillance    3/15/2024    DIAGNOSIS:   This is a 58 y.o. female with a history of stage pT1b N0 Mx grade 3 ER - [place] - HER2 - invasive ductal carcinoma of the left breast. +BRCA2    TREATMENT:   1. Bilateral mastectomy with left sentinel node biopsy and JUNAID flap reconstruction on 2/8/2023 with Dr. Jiménez and Dr. Curry. Final pathology showed left breast IDC (0.9 cm), 0/2 nodes with metastatic carcinoma; right breast with no atypia or malignancy. Second stage revision on 7/5/2023  2. Chemotherapy - patient declined adjuvant chemotherapy with Dr. Reji M.D. Medical Oncology     HISTORY OF PRESENT ILLNESS:   Trinity Morin is a 58 y.o. female comes in for oncological follow up.  She denies change in her breast self-exam specifically denying new masses, skin or nipple changes, or nipple discharge. Past medical and surgical history is updated with no new changes. There have been no changes to family history. The patient denies constitutional symptoms of night sweats, weight loss, new headaches, visual changes, new back or bony pain, chest pain, or shortness of breath.      Review of Systems: See HPI/Interval History for other systems reviewed.     IMAGING:     No recent imaging obtained for review during this visit      MEDICATIONS/ALLERGIES:     Current Outpatient Medications   Medication Instructions    albuterol (PROVENTIL/VENTOLIN HFA) 90 mcg/actuation inhaler INHALE 1 PUFF BY MOUTH FOUR TIMES A DAY AS NEEDED    ALBUTEROL INHL Inhalation, 4 times daily PRN    ascorbic acid (vitamin C) (VITAMIN C) 500 mg    cyanocobalamin (vitamin B-12) 50 mcg, Oral, Daily    gabapentin (NEURONTIN) 300 mg, Oral, 3 times daily    hydrocortisone 2.5 % cream Topical (Top), 2 times daily    hydrOXYzine (ATARAX) 50 mg    methocarbamoL (ROBAXIN) 500 MG Tab TAKE 1 TO 2 TABLETS BY MOUTH THREE TIMES A DAY AS NEEDED FOR MUSCLE SPASMS    vitamin D (VITAMIN D3) 1,000 Units, Oral, Daily     Review  "of patient's allergies indicates:   Allergen Reactions    Nitrofurantoin monohyd/m-cryst Hives    Sulfamethoxazole-trimethoprim Anaphylaxis, Itching, Rash, Swelling and Hives    [facility]extrom[facility]an      [facility]    Macrodantin [nitrofurantoin macrocrystalline]     Rifampin      [facility]    Sulfa (sulfonamide antibiotics)        PHYSICAL EXAM:   /82   Pulse 60   Ht 4' 11" (1.499 m)   Wt 64.4 kg (142 lb)   BMI 28.68 kg/m²     Physical Exam   Vitals reviewed.  Constitutional: She is oriented to person, place, and time.   Eyes: Right eye exhibits no discharge. Left eye exhibits no discharge.   Pulmonary/Chest: Effort normal. No respiratory distress. She has no wheezes. Right breast exhibits no inverted nipple, no mass, no nipple discharge, no skin change and no tenderness. Left breast exhibits no inverted nipple, no mass, no nipple discharge, no skin change and no tenderness.       Abdominal: Normal appearance.   Musculoskeletal: Normal range of motion. Lymphadenopathy:      Cervical: No cervical adenopathy.     Neurological: She is alert and oriented to person, place, and time.   Skin: Skin is warm and dry. No rash noted. No erythema. No pallor.     Psychiatric: Her behavior is normal. Mood normal.         ASSESSMENT:   This is a 58 y.o. female without evidence of recurrence by exam, history or imaging.  S/p bilateral mastectomy with L SLNB and JUNAID flap reconstruction. Declined recommended adjuvant chemotherapy.      PLAN:   1. We discussed there was nothing concerning on exam today. Reassurance given   2. Continue monthly self breast exams and call the clinic with any changes or problems.  3. Screening mammogram not indicated in the setting of bilateral mastectomies  4. Return to clinic in 1 year, sooner if there are any concerns on self exam.   5. Patient unhappy with plastic surgery results would like to proceed with second opinion   6. Continue to follow with Dr. Kitchen    The patient is in " agreement with the plan. Questions were encouraged and answered to patient's satisfaction. Trinity will call our office with any questions or concerns.

## 2024-05-22 ENCOUNTER — PATIENT MESSAGE (OUTPATIENT)
Dept: SURGERY | Facility: CLINIC | Age: 59
End: 2024-05-22
Payer: OTHER GOVERNMENT

## 2024-05-29 ENCOUNTER — PATIENT MESSAGE (OUTPATIENT)
Dept: SURGERY | Facility: CLINIC | Age: 59
End: 2024-05-29
Payer: OTHER GOVERNMENT

## 2024-05-29 ENCOUNTER — TELEPHONE (OUTPATIENT)
Dept: RADIOLOGY | Facility: HOSPITAL | Age: 59
End: 2024-05-29
Payer: OTHER GOVERNMENT

## 2024-05-29 DIAGNOSIS — Z85.3 PERSONAL HISTORY OF BREAST CANCER: Primary | ICD-10-CM

## 2024-05-29 DIAGNOSIS — N64.4 BREAST PAIN: ICD-10-CM

## 2024-05-29 NOTE — TELEPHONE ENCOUNTER
----- Message from Jeannette Giordano MA sent at 5/29/2024  2:10 PM CDT -----  Regarding: Mammogram/US  Conrado Osuna,  Patient requested a Mammogram/US. Naina ordered both tests. Can you schedule her, and I will give her a F/U with Naina after both tests.   Thank you,  Pratibha

## 2024-06-05 ENCOUNTER — OFFICE VISIT (OUTPATIENT)
Dept: SURGERY | Facility: CLINIC | Age: 59
End: 2024-06-05
Payer: OTHER GOVERNMENT

## 2024-06-05 ENCOUNTER — HOSPITAL ENCOUNTER (OUTPATIENT)
Dept: RADIOLOGY | Facility: HOSPITAL | Age: 59
Discharge: HOME | End: 2024-06-05
Attending: NURSE PRACTITIONER
Payer: OTHER GOVERNMENT

## 2024-06-05 VITALS
HEART RATE: 75 BPM | HEIGHT: 59 IN | DIASTOLIC BLOOD PRESSURE: 79 MMHG | WEIGHT: 142 LBS | BODY MASS INDEX: 28.63 KG/M2 | SYSTOLIC BLOOD PRESSURE: 127 MMHG

## 2024-06-05 DIAGNOSIS — N64.4 BREAST PAIN: ICD-10-CM

## 2024-06-05 DIAGNOSIS — Z85.3 PERSONAL HISTORY OF BREAST CANCER: Primary | ICD-10-CM

## 2024-06-05 DIAGNOSIS — Z85.3 PERSONAL HISTORY OF BREAST CANCER: ICD-10-CM

## 2024-06-05 PROCEDURE — 77065 DX MAMMO INCL CAD UNI: CPT | Mod: TC,LT

## 2024-06-05 PROCEDURE — 77065 DX MAMMO INCL CAD UNI: CPT | Mod: 26,LT,, | Performed by: RADIOLOGY

## 2024-06-05 PROCEDURE — 76642 ULTRASOUND BREAST LIMITED: CPT | Mod: 26,LT,, | Performed by: RADIOLOGY

## 2024-06-05 PROCEDURE — 77061 BREAST TOMOSYNTHESIS UNI: CPT | Mod: TC,LT

## 2024-06-05 PROCEDURE — 99212 OFFICE O/P EST SF 10 MIN: CPT | Mod: S$PBB,,, | Performed by: NURSE PRACTITIONER

## 2024-06-05 PROCEDURE — 99999 PR PBB SHADOW E&M-EST. PATIENT-LVL III: CPT | Mod: PBBFAC,,, | Performed by: NURSE PRACTITIONER

## 2024-06-05 PROCEDURE — 76642 ULTRASOUND BREAST LIMITED: CPT | Mod: TC,LT

## 2024-06-05 PROCEDURE — 77061 BREAST TOMOSYNTHESIS UNI: CPT | Mod: 26,LT,, | Performed by: RADIOLOGY

## 2024-06-05 PROCEDURE — 99213 OFFICE O/P EST LOW 20 MIN: CPT | Mod: PBBFAC,25 | Performed by: NURSE PRACTITIONER

## 2024-06-05 NOTE — PROGRESS NOTES
[facility]           Breast Surgery Surveillance    6/5/2024    DIAGNOSIS:   This is a 58 y.o. female with a history of stage pT1b N0 Mx grade 3 ER - [place] - HER2 - invasive ductal carcinoma of the left breast. +BRCA2    TREATMENT:   1. Bilateral mastectomy with left sentinel node biopsy and JUNAID flap reconstruction on 2/8/2023 with Dr. Jiménez and Dr. Curry. Final pathology showed left breast IDC (0.9 cm), 0/2 nodes with metastatic carcinoma; right breast with no atypia or malignancy. Second stage revision on 7/5/2023  2. Chemotherapy - patient declined adjuvant chemotherapy with Dr. Reji M.D. Medical Oncology     HISTORY OF PRESENT ILLNESS:   Trinity Morin is a 58 y.o. female comes in for left breast pain. This has been going on since her surgery but over the last few weeks it has worsened. Denies skin changes.   Underwent left diagnostic mammogram and US which was negative.     Review of Systems: See HPI/Interval History for other systems reviewed.     IMAGING:   Mammo Digital Diagnostic Left with Ole  US Breast Left Limited     History:  Patient is 58 y.o. and is seen for diagnostic imaging. Status post bilateral mastectomies with flap reconstruction. New focal left breast pain.      Films Compared:  Prior images (if available) were compared.     Findings:  This procedure was performed using tomosynthesis. Computer-aided detection was utilized in the interpretation of this examination.  Left  Mammo Digital Diagnostic Left with Ole  The left breast is almost entirely fatty.  There are post-surgical findings from a previous mastectomy with flap reconstruction seen in the left breast. There has been no interval development of a suspicious mass, microcalcification, or architectural distortion.            US Breast Left Limited  There is no evidence of suspicious masses or other abnormal findings in the left breast lower outer quadrant in region of pain.     Impression:  Mammo Digital Diagnostic Left  "with Ole  There is no mammographic evidence of malignancy in the left breast.     US Breast Left Limited  There is no sonographic evidence of malignancy in the left breast.     BI-RADS Category:   Overall: 2 - Benign       MEDICATIONS/ALLERGIES:     Current Outpatient Medications   Medication Instructions    albuterol (PROVENTIL/VENTOLIN HFA) 90 mcg/actuation inhaler INHALE 1 PUFF BY MOUTH FOUR TIMES A DAY AS NEEDED    ALBUTEROL INHL Inhalation, 4 times daily PRN    ascorbic acid (vitamin C) (VITAMIN C) 500 mg    cyanocobalamin (vitamin B-12) 50 mcg, Oral, Daily    gabapentin (NEURONTIN) 300 mg, Oral, 3 times daily    hydrocortisone 2.5 % cream Topical (Top), 2 times daily    hydrOXYzine (ATARAX) 50 mg    methocarbamoL (ROBAXIN) 500 MG Tab TAKE 1 TO 2 TABLETS BY MOUTH THREE TIMES A DAY AS NEEDED FOR MUSCLE SPASMS    vitamin D (VITAMIN D3) 1,000 Units, Oral, Daily     Review of patient's allergies indicates:   Allergen Reactions    Nitrofurantoin monohyd/m-cryst Hives    Sulfamethoxazole-trimethoprim Anaphylaxis, Itching, Rash, Swelling and Hives    Dextromethorphan      [facility]    Macrodantin [nitrofurantoin macrocrystalline]     Rifampin      [facility]    Sulfa (sulfonamide antibiotics)        PHYSICAL EXAM:   /79   Pulse 75   Ht 4' 11" (1.499 m)   Wt 64.4 kg (142 lb)   BMI 28.68 kg/m²     Physical Exam   Vitals reviewed.  Constitutional: She is oriented to person, place, and time.   Eyes: Right eye exhibits no discharge. Left eye exhibits no discharge.   Pulmonary/Chest: Effort normal. No respiratory distress. She has no wheezes. Right breast exhibits no inverted nipple, no mass, no nipple discharge, no skin change and no tenderness. Left breast exhibits no inverted nipple, no mass, no nipple discharge, no skin change and no tenderness.       Abdominal: Normal appearance.   Musculoskeletal: Normal range of motion. Lymphadenopathy:      Cervical: No cervical adenopathy.     Neurological: She is " alert and oriented to person, place, and time.   Skin: Skin is warm and dry. No rash noted. No erythema. No pallor.     Psychiatric: Her behavior is normal. Mood normal.         ASSESSMENT:   This is a 58 y.o. female without evidence of recurrence by exam, history or imaging.  S/p bilateral mastectomy with L SLNB and JUNAID flap reconstruction. Declined recommended adjuvant chemotherapy. Imaging today was negative.      PLAN:   1. We discussed there was nothing concerning on exam today. Reassurance given   2. Continue monthly self breast exams and call the clinic with any changes or problems.  3. Screening mammogram not indicated in the setting of bilateral mastectomies  4. Return to clinic in March 2025, sooner if there are any concerns on self exam.   5. Patient unhappy with plastic surgery results and seeking second opinion       The patient is in agreement with the plan. Questions were encouraged and answered to patient's satisfaction. Trinity will call our office with any questions or concerns.

## 2025-01-07 ENCOUNTER — OFFICE VISIT (OUTPATIENT)
Dept: URGENT CARE | Facility: CLINIC | Age: 60
End: 2025-01-07
Payer: OTHER GOVERNMENT

## 2025-01-07 VITALS
SYSTOLIC BLOOD PRESSURE: 125 MMHG | BODY MASS INDEX: 59.07 KG/M2 | HEART RATE: 68 BPM | HEIGHT: 59 IN | WEIGHT: 293 LBS | TEMPERATURE: 99 F | OXYGEN SATURATION: 99 % | DIASTOLIC BLOOD PRESSURE: 80 MMHG | RESPIRATION RATE: 18 BRPM

## 2025-01-07 DIAGNOSIS — N89.8 VAGINAL ITCHING: ICD-10-CM

## 2025-01-07 DIAGNOSIS — N89.8 VAGINAL ODOR: Primary | ICD-10-CM

## 2025-01-07 LAB
BILIRUBIN, UA POC OHS: NEGATIVE
BLOOD, UA POC OHS: NEGATIVE
CLARITY, UA POC OHS: CLEAR
COLOR, UA POC OHS: YELLOW
GLUCOSE, UA POC OHS: NEGATIVE
KETONES, UA POC OHS: NEGATIVE
LEUKOCYTES, UA POC OHS: NEGATIVE
NITRITE, UA POC OHS: NEGATIVE
PH, UA POC OHS: 5.5
PROTEIN, UA POC OHS: NEGATIVE
SPECIFIC GRAVITY, UA POC OHS: 1.02
UROBILINOGEN, UA POC OHS: 0.2

## 2025-01-07 PROCEDURE — 81515 NFCT DS BV&VAGINITIS DNA ALG: CPT | Performed by: NURSE PRACTITIONER

## 2025-01-07 RX ORDER — METRONIDAZOLE 500 MG/1
500 TABLET ORAL EVERY 12 HOURS
Qty: 14 TABLET | Refills: 0 | Status: SHIPPED | OUTPATIENT
Start: 2025-01-07 | End: 2025-01-14

## 2025-01-07 NOTE — PROGRESS NOTES
"Subjective:      Patient ID: Trinity Morin is a 59 y.o. female.    Vitals:  height is 4' 11" (1.499 m) and weight is 135 kg (297 lb 9.9 oz). Her oral temperature is 98.5 °F (36.9 °C). Her blood pressure is 125/80 and her pulse is 68. Her respiration is 18 and oxygen saturation is 99%.     Chief Complaint: Vaginal Itching    Pt is a 58 yo female c/o vaginal itching. Pt states she also has vaginal odor. Pt states she started to notice her sx 10 days ago. Pt was using some cream, she thinks it was metronidazole. Hx of bv and antibiotic induced yeast infections. Would like tx for bv and cream for yeast for after antibiotic tx.     Vaginal Itching  The patient's primary symptoms include a genital odor. This is a new problem. The current episode started 1 to 4 weeks ago. The problem occurs constantly. The problem has been unchanged. The patient is experiencing no pain. Associated symptoms include chills, frequency and nausea. Pertinent negatives include no abdominal pain or back pain. Nothing aggravates the symptoms. The treatment provided no relief. She is sexually active. It is unknown whether or not her partner has an STD. Her past medical history is significant for a  section.       Constitution: Positive for chills.   Gastrointestinal:  Positive for nausea. Negative for abdominal pain.   Genitourinary:  Positive for frequency.   Musculoskeletal:  Negative for back pain.      Objective:     Physical Exam   Constitutional: She is oriented to person, place, and time.   HENT:   Head: Normocephalic.   Ears:   Right Ear: External ear normal.   Left Ear: External ear normal.   Nose: Nose normal.   Eyes: Conjunctivae are normal.   Cardiovascular: Normal rate.   Pulmonary/Chest: Effort normal.   Musculoskeletal: Normal range of motion.         General: Normal range of motion.   Neurological: She is alert and oriented to person, place, and time.   Skin: Skin is dry.   Psychiatric: Her behavior is normal.   Nursing " note and vitals reviewed.    Results for orders placed or performed in visit on 01/07/25   POCT Urinalysis(Instrument)    Collection Time: 01/07/25  9:54 AM   Result Value Ref Range    Color, POC UA Yellow Yellow, Straw, Colorless    Clarity, POC UA Clear Clear    Glucose, POC UA Negative Negative    Bilirubin, POC UA Negative Negative    Ketones, POC UA Negative Negative    Spec Grav POC UA 1.020 1.005 - 1.030    Blood, POC UA Negative Negative    pH, POC UA 5.5 5.0 - 8.0    Protein, POC UA Negative Negative    Urobilinogen, POC UA 0.2 <=1.0    Nitrite, POC UA Negative Negative    WBC, POC UA Negative Negative     Assessment:     1. Vaginal odor    2. Vaginal itching        Plan:       Vaginal odor  -     POCT Urinalysis(Instrument)  -     Vaginosis Screen by DNA Probe  -     metroNIDAZOLE (FLAGYL) 500 MG tablet; Take 1 tablet (500 mg total) by mouth every 12 (twelve) hours. for 7 days  Dispense: 14 tablet; Refill: 0  -     clotrimazole 2 %; Place 1 applicator vaginally nightly. for 3 days  Dispense: 1 kit; Refill: 0    Vaginal itching      Patient Instructions   - You must understand that you have received an Urgent Care treatment only and that you may be released before all of your medical problems are known or treated.   - You, the patient, will arrange for follow up care as instructed.   - If your condition worsens or fails to improve we recommend that you receive another evaluation at the ER immediately or contact your PCP to discuss your concerns.   - You can call [phone] or [phone] to help schedule an appointment with the appropriate provider.

## 2025-01-07 NOTE — PATIENT INSTRUCTIONS
- You must understand that you have received an Urgent Care treatment only and that you may be released before all of your medical problems are known or treated.   - You, the patient, will arrange for follow up care as instructed.   - If your condition worsens or fails to improve we recommend that you receive another evaluation at the ER immediately or contact your PCP to discuss your concerns.   - You can call [phone] or [phone] to help schedule an appointment with the appropriate provider.

## 2025-01-09 LAB
BACTERIAL VAGINOSIS DNA: DETECTED
CANDIDA GLABRATA/KRUSEI: NOT DETECTED
CANDIDA RRNA VAG QL PROBE: DETECTED
TRICHOMONAS VAGINALIS: NOT DETECTED

## 2025-04-02 ENCOUNTER — OFFICE VISIT (OUTPATIENT)
Dept: SURGERY | Facility: CLINIC | Age: 60
End: 2025-04-02
Payer: OTHER GOVERNMENT

## 2025-04-02 VITALS
SYSTOLIC BLOOD PRESSURE: 134 MMHG | DIASTOLIC BLOOD PRESSURE: 79 MMHG | WEIGHT: 134 LBS | BODY MASS INDEX: 27.01 KG/M2 | HEIGHT: 59 IN | HEART RATE: 56 BPM

## 2025-04-02 DIAGNOSIS — Z90.13 S/P MASTECTOMY, BILATERAL: ICD-10-CM

## 2025-04-02 DIAGNOSIS — Z85.3 PERSONAL HISTORY OF BREAST CANCER: Primary | ICD-10-CM

## 2025-04-02 DIAGNOSIS — Z12.39 SCREENING BREAST EXAMINATION: ICD-10-CM

## 2025-04-02 PROCEDURE — 99213 OFFICE O/P EST LOW 20 MIN: CPT | Mod: S$PBB,,, | Performed by: PHYSICIAN ASSISTANT

## 2025-04-02 PROCEDURE — 99213 OFFICE O/P EST LOW 20 MIN: CPT | Mod: PBBFAC | Performed by: PHYSICIAN ASSISTANT

## 2025-04-02 PROCEDURE — 99999 PR PBB SHADOW E&M-EST. PATIENT-LVL III: CPT | Mod: PBBFAC,,, | Performed by: PHYSICIAN ASSISTANT

## 2025-04-02 NOTE — PROGRESS NOTES
[facility]           Breast Surgery Surveillance    2025    DIAGNOSIS:   This is a 59 y.o. female with a history of stage pT1b N0 Mx grade 3 ER - [place] - HER2 - invasive ductal carcinoma of the left breast. +BRCA2    TREATMENT:   1. Bilateral mastectomy with left sentinel node biopsy and JUNAID flap reconstruction on 2023 with Dr. Jiménez and Dr. Curry. Final pathology showed left breast IDC (0.9 cm), 0/2 nodes with metastatic carcinoma; right breast with no atypia or malignancy. Second stage revision on 2023  2. Chemotherapy - patient declined adjuvant chemotherapy with Dr. Reji M.D. Medical Oncology     HISTORY OF PRESENT ILLNESS:   Trinity Morin is a 59 y.o. female comes in for routine follow up. She denies change in her breast self-exam specifically denying new masses, skin or nipple changes, or nipple discharge. Past medical and surgical history is updated with no new changes. There have been no changes to family history. The patient denies constitutional symptoms of night sweats, weight loss, new headaches, visual changes, new back or bony pain, chest pain, or shortness of breath.     Review of Systems: See HPI/Interval History for other systems reviewed.     IMAGIN/5/25 Left Diagnostic Mammo/US:    Findings:  This procedure was performed using tomosynthesis. Computer-aided detection was utilized in the interpretation of this examination.  Left  Mammo Digital Diagnostic Left with Ole  The left breast is almost entirely fatty.  There are post-surgical findings from a previous mastectomy with flap reconstruction seen in the left breast. There has been no interval development of a suspicious mass, microcalcification, or architectural distortion.            US Breast Left Limited  There is no evidence of suspicious masses or other abnormal findings in the left breast lower outer quadrant in region of pain.     Impression:  Mammo Digital Diagnostic Left with Ole  There is no  "mammographic evidence of malignancy in the left breast.     US Breast Left Limited  There is no sonographic evidence of malignancy in the left breast.     BI-RADS Category:   Overall: 2 - Benign        Recommendation:  In this patient status post bilateral mastectomies, future breast imaging can be performed at clinical discretion.      If the pain remains a concern, clinical evaluation is recommended.      MEDICATIONS/ALLERGIES:     Current Outpatient Medications   Medication Instructions    albuterol (PROVENTIL/VENTOLIN HFA) 90 mcg/actuation inhaler INHALE 1 PUFF BY MOUTH FOUR TIMES A DAY AS NEEDED    ALBUTEROL INHL 4 times daily PRN    ascorbic acid (vitamin C) (VITAMIN C) 500 mg    cyanocobalamin (vitamin B-12) 50 mcg, Daily    gabapentin (NEURONTIN) 300 mg, Oral, 3 times daily    hydrocortisone 2.5 % cream Topical (Top), 2 times daily    hydrOXYzine (ATARAX) 50 mg    methocarbamoL (ROBAXIN) 500 MG Tab TAKE 1 TO 2 TABLETS BY MOUTH THREE TIMES A DAY AS NEEDED FOR MUSCLE SPASMS    vitamin D (VITAMIN D3) 1,000 Units, Daily     Review of patient's allergies indicates:   Allergen Reactions    Nitrofurantoin monohyd/m-cryst Hives    Sulfamethoxazole-trimethoprim Anaphylaxis, Itching, Rash, Swelling and Hives    Dextromethorphan      [facility]    Macrodantin [nitrofurantoin macrocrystalline]     Rifampin      [facility]    Sulfa (sulfonamide antibiotics)        PHYSICAL EXAM:   /79   Pulse (!) 56   Ht 4' 11" (1.499 m)   Wt 60.8 kg (134 lb)   BMI 27.06 kg/m²     Physical Exam   Vitals reviewed.  Constitutional: She is oriented to person, place, and time.   HENT:   Head: Normocephalic and atraumatic.   Nose: Nose normal.   Eyes: Pupils are equal, round, and reactive to light. Right eye exhibits no discharge. Left eye exhibits no discharge.   Pulmonary/Chest: Effort normal and breath sounds normal. No stridor. No respiratory distress. She has no wheezes. She exhibits no mass, no tenderness and no edema. " Right breast exhibits no inverted nipple, no mass, no nipple discharge, no skin change and no tenderness. Left breast exhibits no inverted nipple, no mass, no nipple discharge, no skin change and no tenderness. No breast swelling or bleeding. Breasts are symmetrical.       Abdominal: Normal appearance.   Genitourinary: No breast swelling or bleeding.   Musculoskeletal: Normal range of motion. Lymphadenopathy:      Cervical: No cervical adenopathy.     Neurological: She is alert and oriented to person, place, and time.   Skin: Skin is warm and dry. No rash noted. No erythema. No pallor.     Psychiatric: Her behavior is normal. Mood, judgment and thought content normal.         ASSESSMENT:   This is a 59 y.o. female without evidence of recurrence by exam, history or imaging.  S/p bilateral mastectomy with L SLNB and JUNAID flap reconstruction. Declined recommended adjuvant chemotherapy.      PLAN:   1. CBE without concerning findings. Patient reassured.   2. Continue monthly self breast exams and call the clinic with any changes or problems.  3. Follow up in 1 year.       The patient is in agreement with the plan. Questions were encouraged and answered to patient's satisfaction. Trinity will call our office with any questions or concerns.

## 2025-04-16 ENCOUNTER — OFFICE VISIT (OUTPATIENT)
Dept: URGENT CARE | Facility: CLINIC | Age: 60
End: 2025-04-16
Payer: OTHER GOVERNMENT

## 2025-04-16 VITALS
SYSTOLIC BLOOD PRESSURE: 119 MMHG | DIASTOLIC BLOOD PRESSURE: 77 MMHG | HEART RATE: 68 BPM | TEMPERATURE: 98 F | RESPIRATION RATE: 18 BRPM | HEIGHT: 59 IN | OXYGEN SATURATION: 97 % | BODY MASS INDEX: 27.03 KG/M2 | WEIGHT: 134.06 LBS

## 2025-04-16 DIAGNOSIS — N89.8 VAGINAL DISCHARGE: Primary | ICD-10-CM

## 2025-04-16 LAB
BILIRUBIN, UA POC OHS: NEGATIVE
BLOOD, UA POC OHS: NEGATIVE
CLARITY, UA POC OHS: CLEAR
COLOR, UA POC OHS: YELLOW
GLUCOSE, UA POC OHS: NEGATIVE
KETONES, UA POC OHS: NEGATIVE
LEUKOCYTES, UA POC OHS: NEGATIVE
NITRITE, UA POC OHS: NEGATIVE
PH, UA POC OHS: 6
PROTEIN, UA POC OHS: NEGATIVE
SPECIFIC GRAVITY, UA POC OHS: 1.01
UROBILINOGEN, UA POC OHS: 0.2

## 2025-04-16 PROCEDURE — 81003 URINALYSIS AUTO W/O SCOPE: CPT | Mod: QW,S$GLB,,

## 2025-04-16 PROCEDURE — 99213 OFFICE O/P EST LOW 20 MIN: CPT | Mod: S$GLB,,,

## 2025-04-16 PROCEDURE — 81515 NFCT DS BV&VAGINITIS DNA ALG: CPT

## 2025-04-16 RX ORDER — DICLOFENAC SODIUM 10 MG/G
GEL TOPICAL
COMMUNITY
Start: 2024-10-03

## 2025-04-16 RX ORDER — ALBUTEROL SULFATE 90 UG/1
INHALANT RESPIRATORY (INHALATION)
COMMUNITY
Start: 2024-10-03

## 2025-04-16 RX ORDER — CETIRIZINE HYDROCHLORIDE 10 MG/1
10 TABLET ORAL
COMMUNITY
Start: 2024-10-03

## 2025-04-16 RX ORDER — FLUCONAZOLE 150 MG/1
150 TABLET ORAL DAILY
Qty: 1 TABLET | Refills: 0 | Status: SHIPPED | OUTPATIENT
Start: 2025-04-16 | End: 2025-04-17

## 2025-04-16 RX ORDER — LANOLIN ALCOHOL/MO/W.PET/CERES
100 CREAM (GRAM) TOPICAL
COMMUNITY

## 2025-04-16 RX ORDER — ALBUTEROL SULFATE 0.83 MG/ML
SOLUTION RESPIRATORY (INHALATION)
COMMUNITY
Start: 2025-04-08

## 2025-04-16 RX ORDER — ASCORBIC ACID 500 MG
TABLET ORAL
COMMUNITY
Start: 2014-01-01

## 2025-04-16 RX ORDER — ASPIRIN 325 MG
TABLET, DELAYED RELEASE (ENTERIC COATED) ORAL
COMMUNITY
Start: 2024-10-04

## 2025-04-16 RX ORDER — IBUPROFEN 100 MG/1
TABLET, CHEWABLE ORAL
COMMUNITY

## 2025-04-16 RX ORDER — METRONIDAZOLE 500 MG/1
500 TABLET ORAL EVERY 12 HOURS
Qty: 14 TABLET | Refills: 0 | Status: SHIPPED | OUTPATIENT
Start: 2025-04-16 | End: 2025-04-23

## 2025-04-16 RX ORDER — METRONIDAZOLE 7.5 MG/G
GEL VAGINAL
COMMUNITY
Start: 2024-10-03 | End: 2025-04-16

## 2025-04-16 RX ORDER — METHOCARBAMOL 500 MG/1
TABLET, FILM COATED ORAL
COMMUNITY

## 2025-04-16 NOTE — PROGRESS NOTES
"Subjective:      Patient ID: Trinity Morin is a 59 y.o. female.    Vitals:  height is 4' 11" (1.499 m) and weight is 60.8 kg (134 lb 0.6 oz). Her oral temperature is 98.3 °F (36.8 °C). Her blood pressure is 119/77 and her pulse is 68. Her respiration is 18 and oxygen saturation is 97%.     Chief Complaint: Vaginal Discharge    Pt states she has a vaginal discharge that started one week ago.  Has dealt with recurring BV. Declines any fever or flank pain. Declines any new foods.       Vaginal Discharge  The patient's primary symptoms include a genital odor and vaginal discharge. This is a new problem. The current episode started in the past 7 days. The problem has been gradually worsening. The patient is experiencing no pain. Pertinent negatives include no dysuria, fever, frequency or urgency. The vaginal discharge was milky and white. There has been no bleeding. She has not been passing clots. She has not been passing tissue. Nothing aggravates the symptoms. The treatment provided no relief. Contraceptive use: post menopausal. Her menstrual history has been regular.       Constitution: Negative for fever.   HENT: Negative.     Neck: neck negative.   Cardiovascular: Negative.    Eyes: Negative.    Respiratory: Negative.     Endocrine: negative.   Genitourinary:  Positive for vaginal discharge. Negative for dysuria, frequency and urgency.   Skin: Negative.    Allergic/Immunologic: Negative.    Hematologic/Lymphatic: Negative.    Psychiatric/Behavioral: Negative.        Objective:     Physical Exam   Constitutional: She is oriented to person, place, and time.  Non-toxic appearance. No distress.   HENT:   Head: Normocephalic and atraumatic.   Ears:   Right Ear: External ear normal.   Left Ear: External ear normal.   Nose: Nose normal.   Mouth/Throat: Mucous membranes are moist.   Eyes: Conjunctivae are normal.   Cardiovascular: Normal rate, regular rhythm and normal heart sounds.   Pulmonary/Chest: Effort normal and " breath sounds normal. No stridor. No respiratory distress. She has no wheezes. She has no rhonchi. She has no rales.   Abdominal: Bowel sounds are normal. She exhibits no distension. There is no abdominal tenderness. There is no rebound, no guarding, no left CVA tenderness and no right CVA tenderness.   Musculoskeletal: Normal range of motion.         General: Normal range of motion.   Neurological: She is alert and oriented to person, place, and time.   Skin: Skin is warm, dry and not diaphoretic.   Psychiatric: Her behavior is normal. Mood, judgment and thought content normal.     Results for orders placed or performed in visit on 04/16/25   POCT Urinalysis(Instrument)    Collection Time: 04/16/25 10:49 AM   Result Value Ref Range    Color, POC UA Yellow Yellow, Straw, Colorless    Clarity, POC UA Clear Clear    Glucose, POC UA Negative Negative    Bilirubin, POC UA Negative Negative    Ketones, POC UA Negative Negative    Spec Grav POC UA 1.015 1.005 - 1.030    Blood, POC UA Negative Negative    pH, POC UA 6.0 5.0 - 8.0    Protein, POC UA Negative Negative    Urobilinogen, POC UA 0.2 <=1.0    Nitrite, POC UA Negative Negative    WBC, POC UA Negative Negative       Assessment:     1. Vaginal discharge        Plan:       Vaginal discharge  -     POCT Urinalysis(Instrument)  -     Vaginosis Screen by DNA Probe  -     metroNIDAZOLE (FLAGYL) 500 MG tablet; Take 1 tablet (500 mg total) by mouth every 12 (twelve) hours. for 7 days  Dispense: 14 tablet; Refill: 0  -     fluconazole (DIFLUCAN) 150 MG Tab; Take 1 tablet (150 mg total) by mouth once daily. for 1 day  Dispense: 1 tablet; Refill: 0      Patient Instructions   - You must understand that you have received an Urgent Care treatment only and that you may be released before all of your medical problems are known or treated.   - You, the patient, will arrange for follow up care as instructed.   - If your condition worsens or fails to improve we recommend that you  receive another evaluation at the ER immediately or contact your PCP to discuss your concerns or return here.  Please drink plenty of water  Limit sugary foods/beverages  Will notify of send out results  Follow up with PCP as needed

## 2025-04-16 NOTE — PATIENT INSTRUCTIONS
- You must understand that you have received an Urgent Care treatment only and that you may be released before all of your medical problems are known or treated.   - You, the patient, will arrange for follow up care as instructed.   - If your condition worsens or fails to improve we recommend that you receive another evaluation at the ER immediately or contact your PCP to discuss your concerns or return here.  Please drink plenty of water  Limit sugary foods/beverages  Will notify of send out results  Follow up with PCP as needed

## 2025-04-23 ENCOUNTER — RESULTS FOLLOW-UP (OUTPATIENT)
Dept: URGENT CARE | Facility: CLINIC | Age: 60
End: 2025-04-23

## (undated) DEVICE — APPLIER CLIP LIAGCLIP 9.375IN

## (undated) DEVICE — APPLICATOR CHLORAPREP ORN 26ML

## (undated) DEVICE — SPONGE DERMACEA GAUZE 4X4

## (undated) DEVICE — EVACUATOR WOUND BULB 100CC

## (undated) DEVICE — PACK UNIVERSAL SPLIT II

## (undated) DEVICE — DRAIN CHANNEL ROUND 15FR

## (undated) DEVICE — SYR 10CC LUER LOCK

## (undated) DEVICE — BRA CLASSIC COMFORT 40 BLACK

## (undated) DEVICE — UNDERGLOVES BIOGEL PI SZ 7 LF

## (undated) DEVICE — NDL SPINAL SPINOCAN 22GX3.5

## (undated) DEVICE — TOWEL OR DISP STRL BLUE 4/PK

## (undated) DEVICE — SOL BETADINE 5%

## (undated) DEVICE — DRAPE VS3 IRIDIUM MICROSCOPE

## (undated) DEVICE — SKINMARKER & RULER REGULAR X-F

## (undated) DEVICE — GOWN SURGICAL X-LARGE

## (undated) DEVICE — ADHESIVE DERMABOND ADVANCED

## (undated) DEVICE — POSITIONER HEAD DONUT 9IN FOAM

## (undated) DEVICE — BLADE SURG #15 CARBON STEEL

## (undated) DEVICE — MANIFOLD 4 PORT

## (undated) DEVICE — HOLDER DRAIN POUCH PINK

## (undated) DEVICE — SUT ETHILON 3-0 FS-1 30

## (undated) DEVICE — SYS REVOLVE FAT PROCESSING

## (undated) DEVICE — GLOVE BIOGEL SKINSENSE PI 8.0

## (undated) DEVICE — SPONGE COTTON TRAY 4X4IN

## (undated) DEVICE — SUT MONOCRYL 3-0 PS-2 UND

## (undated) DEVICE — SUT MONO 2-0 CT-1 UNDYED

## (undated) DEVICE — WAVEGUIDE BRITEFIELD DISP

## (undated) DEVICE — BOVIE SUCTION

## (undated) DEVICE — BLADE PEAK PLASMA

## (undated) DEVICE — SKIN MARKER DEVON 160

## (undated) DEVICE — DRESSING ANTIMICROBIAL 1 INCH

## (undated) DEVICE — CLIP DOUBLE MICRO.

## (undated) DEVICE — BRA STYLE 7 SIZE 42

## (undated) DEVICE — DRAPE THREE-QTR REINF 53X77IN

## (undated) DEVICE — DRESSING TRANS 4X4 TEGADERM

## (undated) DEVICE — PAD ABD 8X10 STERILE

## (undated) DEVICE — SPONGE PATTY SURGICAL .5X3IN

## (undated) DEVICE — SYR 30CC LUER LOCK

## (undated) DEVICE — DRESSING XEROFORM FOIL PK 1X8

## (undated) DEVICE — GLOVE BIOGEL SKINSENSE PI 6.5

## (undated) DEVICE — GOWN POLY REINF BRTH SLV XL

## (undated) DEVICE — SUT STRATAFIX PGAPCL 3 FS-1

## (undated) DEVICE — SKINMARKER W/RULER DEVON

## (undated) DEVICE — DRAPE OPMI STERILE

## (undated) DEVICE — SPONGE LAP 18X18 PREWASHED

## (undated) DEVICE — SEE MEDLINE ITEM 152512

## (undated) DEVICE — SUT PDS II 0 CT VIL MONO 36

## (undated) DEVICE — SOL IRRI STRL WATER 1000ML

## (undated) DEVICE — SUT 2/0 30IN SILK BLK BRAI

## (undated) DEVICE — SUT MCRYL PLUS 4-0 PS2 27IN

## (undated) DEVICE — BLANKET HYPER ADULT 24X60IN

## (undated) DEVICE — ELECTRODE REM PLYHSV RETURN 9

## (undated) DEVICE — SPEARS EYE 10/PK

## (undated) DEVICE — SUT MONOCYRL 4-0 PS2 UND

## (undated) DEVICE — PROBE FLOW DOPPLER

## (undated) DEVICE — SUT STRATAFIX PDS 1 CTX 18IN

## (undated) DEVICE — Device

## (undated) DEVICE — DRAPE STERI INSTRUMENT 1018

## (undated) DEVICE — APPLIER LIGACLIP MED 11IN

## (undated) DEVICE — CLAMP SINGLE MICRO.

## (undated) DEVICE — DRESSING XEROFORM 1X8IN

## (undated) DEVICE — TUBE PAL ASPIR CONN STRL 12FT

## (undated) DEVICE — CATH IV CATHLON W/HUB18GAX

## (undated) DEVICE — DRAPE CORETEMP FLD WRM 56X62IN

## (undated) DEVICE — BLADE SURG CARBON STEEL #10

## (undated) DEVICE — GOWN NONREINF SET-IN SLV XL

## (undated) DEVICE — SOL NORMAL USPCA 0.9%

## (undated) DEVICE — SYS PRINEO SKIN CLOSURE

## (undated) DEVICE — DRESSING MEPILEX AG 4X4

## (undated) DEVICE — ELECTRODE BLADE INSULATED 1 IN

## (undated) DEVICE — TRAY MINOR GEN SURG

## (undated) DEVICE — GOWN AERO CHROME W/ TOWEL XL

## (undated) DEVICE — SEE MEDLINE ITEM 146322

## (undated) DEVICE — SEE MEDLINE ITEM 146417

## (undated) DEVICE — SUT VICRYL PLUS 2-0 CT1 18

## (undated) DEVICE — STOCKINETTE TUBULAR 2PL 6 X 4

## (undated) DEVICE — SUT 9/0 5IN ETHILON BLK MON

## (undated) DEVICE — COVER PROBE US 5.5X58L NON LTX

## (undated) DEVICE — SEE MEDLINE ITEM 157128

## (undated) DEVICE — TUBING INFILTRATION STRL DISP

## (undated) DEVICE — BINDER ABDOMINAL 9 30-45

## (undated) DEVICE — STAPLER SKIN ROTATING HEAD

## (undated) DEVICE — GLOVE BIOGEL SKINSENSE PI 7.0

## (undated) DEVICE — NDL SAFETY 22G X 1.5 ECLIPSE

## (undated) DEVICE — SEE MEDLINE ITEM 152529

## (undated) DEVICE — SUT SILK 2-0 PS 18IN BLACK

## (undated) DEVICE — NDL HYPO REG 25G X 1 1/2

## (undated) DEVICE — CONTAINER SPECIMEN STRL 4OZ

## (undated) DEVICE — SYR 50ML CATH TIP

## (undated) DEVICE — CONTAINER SPECIMEN OR STER 4OZ

## (undated) DEVICE — TRAY FOLEY 16FR INFECTION CONT

## (undated) DEVICE — CORD BIPOLAR 12 FOOT

## (undated) DEVICE — SUT 2/0 27IN PDS II VIO MO

## (undated) DEVICE — SEE MEDLINE ITEM 152622

## (undated) DEVICE — CABLE EXT DOPPLER FLOW PROBE

## (undated) DEVICE — COVER MAYO STAND REINFRCD 30